# Patient Record
Sex: FEMALE | Race: WHITE | NOT HISPANIC OR LATINO
[De-identification: names, ages, dates, MRNs, and addresses within clinical notes are randomized per-mention and may not be internally consistent; named-entity substitution may affect disease eponyms.]

---

## 2016-12-19 NOTE — PATIENT PROFILE ADULT. - PMH
Acid reflux    Depression    Goiter    Hypertension Acid reflux    Depression    Goiter    Hypertension    Hypothyroid Acid reflux    Anxiety and depression    Goiter    HLD (hyperlipidemia)    Hypertension    Hypothyroid

## 2016-12-19 NOTE — PATIENT PROFILE ADULT. - PSH
History of adenoidectomy  age 6 yrs old  History of appendectomy  age 11 yrs old  History of bunionectomy of both great toes  2012- right; 10 yrs ago - left  History of cholecystectomy  13 yrs ago  History of pilonidal cyst  removal - age 18 yrs old  History of tonsillectomy  age 19 yrs old  History of total knee replacement  left- 2010

## 2016-12-19 NOTE — PATIENT PROFILE ADULT. - VISION (WITH CORRECTIVE LENSES IF THE PATIENT USUALLY WEARS THEM):
eyeglasses for reading and distance/Partially impaired: cannot see medication labels or newsprint, but can see obstacles in path, and the surrounding layout; can count fingers at arm's length Normal vision: sees adequately in most situations; can see medication labels, newsprint/eyeglasses for reading and distance

## 2016-12-20 VITALS
HEIGHT: 66 IN | OXYGEN SATURATION: 96 % | HEART RATE: 58 BPM | SYSTOLIC BLOOD PRESSURE: 132 MMHG | RESPIRATION RATE: 16 BRPM | TEMPERATURE: 97 F | WEIGHT: 210.98 LBS | DIASTOLIC BLOOD PRESSURE: 61 MMHG

## 2017-01-03 ENCOUNTER — RESULT REVIEW (OUTPATIENT)
Age: 72
End: 2017-01-03

## 2017-01-04 ENCOUNTER — INPATIENT (INPATIENT)
Facility: HOSPITAL | Age: 72
LOS: 1 days | Discharge: HOME | DRG: 472 | End: 2017-01-06
Attending: NEUROLOGICAL SURGERY | Admitting: NEUROLOGICAL SURGERY
Payer: MEDICARE

## 2017-01-04 DIAGNOSIS — Z90.89 ACQUIRED ABSENCE OF OTHER ORGANS: Chronic | ICD-10-CM

## 2017-01-04 DIAGNOSIS — Z90.49 ACQUIRED ABSENCE OF OTHER SPECIFIED PARTS OF DIGESTIVE TRACT: Chronic | ICD-10-CM

## 2017-01-04 DIAGNOSIS — Z96.659 PRESENCE OF UNSPECIFIED ARTIFICIAL KNEE JOINT: Chronic | ICD-10-CM

## 2017-01-04 DIAGNOSIS — Z98.890 OTHER SPECIFIED POSTPROCEDURAL STATES: Chronic | ICD-10-CM

## 2017-01-04 DIAGNOSIS — Z87.2 PERSONAL HISTORY OF DISEASES OF THE SKIN AND SUBCUTANEOUS TISSUE: Chronic | ICD-10-CM

## 2017-01-04 LAB
ANION GAP SERPL CALC-SCNC: 12 MMOL/L — SIGNIFICANT CHANGE UP (ref 9–16)
BASOPHILS NFR BLD AUTO: 0.3 % — SIGNIFICANT CHANGE UP (ref 0–2)
BUN SERPL-MCNC: 11 MG/DL — SIGNIFICANT CHANGE UP (ref 7–23)
CALCIUM SERPL-MCNC: 8.4 MG/DL — LOW (ref 8.5–10.5)
CHLORIDE SERPL-SCNC: 107 MMOL/L — SIGNIFICANT CHANGE UP (ref 96–108)
CO2 SERPL-SCNC: 23 MMOL/L — SIGNIFICANT CHANGE UP (ref 22–31)
CREAT SERPL-MCNC: 0.64 MG/DL — SIGNIFICANT CHANGE UP (ref 0.5–1.3)
EOSINOPHIL NFR BLD AUTO: 0.1 % — SIGNIFICANT CHANGE UP (ref 0–6)
GLUCOSE SERPL-MCNC: 177 MG/DL — HIGH (ref 70–99)
HCT VFR BLD CALC: 38.2 % — SIGNIFICANT CHANGE UP (ref 34.5–45)
HGB BLD-MCNC: 12.9 G/DL — SIGNIFICANT CHANGE UP (ref 11.5–15.5)
LYMPHOCYTES # BLD AUTO: 13.5 % — SIGNIFICANT CHANGE UP (ref 13–44)
MCHC RBC-ENTMCNC: 30.2 PG — SIGNIFICANT CHANGE UP (ref 27–34)
MCHC RBC-ENTMCNC: 33.8 G/DL — SIGNIFICANT CHANGE UP (ref 32–36)
MCV RBC AUTO: 89.5 FL — SIGNIFICANT CHANGE UP (ref 80–100)
MONOCYTES NFR BLD AUTO: 0.9 % — LOW (ref 2–14)
NEUTROPHILS NFR BLD AUTO: 85.2 % — HIGH (ref 43–77)
PLATELET # BLD AUTO: 221 K/UL — SIGNIFICANT CHANGE UP (ref 150–400)
POTASSIUM SERPL-MCNC: 3.3 MMOL/L — LOW (ref 3.5–5.3)
POTASSIUM SERPL-SCNC: 3.3 MMOL/L — LOW (ref 3.5–5.3)
RBC # BLD: 4.27 M/UL — SIGNIFICANT CHANGE UP (ref 3.8–5.2)
RBC # FLD: 12.8 % — SIGNIFICANT CHANGE UP (ref 10.3–16.9)
SODIUM SERPL-SCNC: 142 MMOL/L — SIGNIFICANT CHANGE UP (ref 135–145)
WBC # BLD: 7.6 K/UL — SIGNIFICANT CHANGE UP (ref 3.8–10.5)
WBC # FLD AUTO: 7.6 K/UL — SIGNIFICANT CHANGE UP (ref 3.8–10.5)

## 2017-01-04 RX ORDER — LEVOTHYROXINE SODIUM 125 MCG
1 TABLET ORAL
Qty: 0 | Refills: 0 | COMMUNITY

## 2017-01-04 RX ORDER — GLUCAGON INJECTION, SOLUTION 0.5 MG/.1ML
1 INJECTION, SOLUTION SUBCUTANEOUS ONCE
Qty: 0 | Refills: 0 | Status: DISCONTINUED | OUTPATIENT
Start: 2017-01-04 | End: 2017-01-06

## 2017-01-04 RX ORDER — VANCOMYCIN HCL 1 G
1000 VIAL (EA) INTRAVENOUS ONCE
Qty: 0 | Refills: 0 | Status: COMPLETED | OUTPATIENT
Start: 2017-01-04 | End: 2017-01-04

## 2017-01-04 RX ORDER — ACETAMINOPHEN 500 MG
650 TABLET ORAL EVERY 6 HOURS
Qty: 0 | Refills: 0 | Status: DISCONTINUED | OUTPATIENT
Start: 2017-01-04 | End: 2017-01-06

## 2017-01-04 RX ORDER — SERTRALINE 25 MG/1
50 TABLET, FILM COATED ORAL DAILY
Qty: 0 | Refills: 0 | Status: DISCONTINUED | OUTPATIENT
Start: 2017-01-04 | End: 2017-01-06

## 2017-01-04 RX ORDER — DEXTROSE 50 % IN WATER 50 %
25 SYRINGE (ML) INTRAVENOUS ONCE
Qty: 0 | Refills: 0 | Status: DISCONTINUED | OUTPATIENT
Start: 2017-01-04 | End: 2017-01-06

## 2017-01-04 RX ORDER — SENNA PLUS 8.6 MG/1
2 TABLET ORAL AT BEDTIME
Qty: 0 | Refills: 0 | Status: DISCONTINUED | OUTPATIENT
Start: 2017-01-04 | End: 2017-01-06

## 2017-01-04 RX ORDER — DEXAMETHASONE 0.5 MG/5ML
4 ELIXIR ORAL EVERY 6 HOURS
Qty: 0 | Refills: 0 | Status: COMPLETED | OUTPATIENT
Start: 2017-01-04 | End: 2017-01-05

## 2017-01-04 RX ORDER — EZETIMIBE 10 MG/1
1 TABLET ORAL
Qty: 0 | Refills: 0 | COMMUNITY

## 2017-01-04 RX ORDER — OXYBUTYNIN CHLORIDE 5 MG
5 TABLET ORAL
Qty: 0 | Refills: 0 | Status: DISCONTINUED | OUTPATIENT
Start: 2017-01-04 | End: 2017-01-06

## 2017-01-04 RX ORDER — OXYBUTYNIN CHLORIDE 5 MG
5 TABLET ORAL
Qty: 0 | Refills: 0 | Status: DISCONTINUED | OUTPATIENT
Start: 2017-01-04 | End: 2017-01-04

## 2017-01-04 RX ORDER — LEVOTHYROXINE SODIUM 125 MCG
88 TABLET ORAL DAILY
Qty: 0 | Refills: 0 | Status: DISCONTINUED | OUTPATIENT
Start: 2017-01-04 | End: 2017-01-06

## 2017-01-04 RX ORDER — METOCLOPRAMIDE HCL 10 MG
10 TABLET ORAL ONCE
Qty: 0 | Refills: 0 | Status: COMPLETED | OUTPATIENT
Start: 2017-01-04 | End: 2017-01-04

## 2017-01-04 RX ORDER — DEXTROSE 50 % IN WATER 50 %
1 SYRINGE (ML) INTRAVENOUS ONCE
Qty: 0 | Refills: 0 | Status: DISCONTINUED | OUTPATIENT
Start: 2017-01-04 | End: 2017-01-06

## 2017-01-04 RX ORDER — SODIUM CHLORIDE 9 MG/ML
1000 INJECTION, SOLUTION INTRAVENOUS
Qty: 0 | Refills: 0 | Status: DISCONTINUED | OUTPATIENT
Start: 2017-01-04 | End: 2017-01-06

## 2017-01-04 RX ORDER — AMLODIPINE BESYLATE 2.5 MG/1
1 TABLET ORAL
Qty: 0 | Refills: 0 | COMMUNITY

## 2017-01-04 RX ORDER — DEXTROSE 50 % IN WATER 50 %
12.5 SYRINGE (ML) INTRAVENOUS ONCE
Qty: 0 | Refills: 0 | Status: DISCONTINUED | OUTPATIENT
Start: 2017-01-04 | End: 2017-01-06

## 2017-01-04 RX ORDER — INSULIN LISPRO 100/ML
VIAL (ML) SUBCUTANEOUS
Qty: 0 | Refills: 0 | Status: DISCONTINUED | OUTPATIENT
Start: 2017-01-04 | End: 2017-01-06

## 2017-01-04 RX ORDER — AMLODIPINE BESYLATE 2.5 MG/1
2.5 TABLET ORAL DAILY
Qty: 0 | Refills: 0 | Status: DISCONTINUED | OUTPATIENT
Start: 2017-01-04 | End: 2017-01-06

## 2017-01-04 RX ORDER — DEXTROSE MONOHYDRATE, SODIUM CHLORIDE, AND POTASSIUM CHLORIDE 50; .745; 4.5 G/1000ML; G/1000ML; G/1000ML
1000 INJECTION, SOLUTION INTRAVENOUS
Qty: 0 | Refills: 0 | Status: DISCONTINUED | OUTPATIENT
Start: 2017-01-04 | End: 2017-01-05

## 2017-01-04 RX ORDER — VALSARTAN 80 MG/1
160 TABLET ORAL DAILY
Qty: 0 | Refills: 0 | Status: DISCONTINUED | OUTPATIENT
Start: 2017-01-04 | End: 2017-01-06

## 2017-01-04 RX ORDER — SERTRALINE 25 MG/1
1 TABLET, FILM COATED ORAL
Qty: 0 | Refills: 0 | COMMUNITY

## 2017-01-04 RX ORDER — VALSARTAN 80 MG/1
1 TABLET ORAL
Qty: 0 | Refills: 0 | COMMUNITY

## 2017-01-04 RX ORDER — OXYBUTYNIN CHLORIDE 5 MG
0 TABLET ORAL
Qty: 0 | Refills: 0 | COMMUNITY

## 2017-01-04 RX ORDER — DOCUSATE SODIUM 100 MG
100 CAPSULE ORAL THREE TIMES A DAY
Qty: 0 | Refills: 0 | Status: DISCONTINUED | OUTPATIENT
Start: 2017-01-04 | End: 2017-01-06

## 2017-01-04 RX ORDER — SIMVASTATIN 20 MG/1
20 TABLET, FILM COATED ORAL AT BEDTIME
Qty: 0 | Refills: 0 | Status: DISCONTINUED | OUTPATIENT
Start: 2017-01-04 | End: 2017-01-06

## 2017-01-04 RX ORDER — HYDROMORPHONE HYDROCHLORIDE 2 MG/ML
1 INJECTION INTRAMUSCULAR; INTRAVENOUS; SUBCUTANEOUS
Qty: 0 | Refills: 0 | Status: DISCONTINUED | OUTPATIENT
Start: 2017-01-04 | End: 2017-01-06

## 2017-01-04 RX ORDER — MAGNESIUM HYDROXIDE 400 MG/1
30 TABLET, CHEWABLE ORAL EVERY 12 HOURS
Qty: 0 | Refills: 0 | Status: DISCONTINUED | OUTPATIENT
Start: 2017-01-04 | End: 2017-01-06

## 2017-01-04 RX ADMIN — Medication 2: at 18:47

## 2017-01-04 RX ADMIN — Medication 4 MILLIGRAM(S): at 18:41

## 2017-01-04 RX ADMIN — SIMVASTATIN 20 MILLIGRAM(S): 20 TABLET, FILM COATED ORAL at 23:15

## 2017-01-04 RX ADMIN — Medication 10 MILLIGRAM(S): at 18:42

## 2017-01-04 RX ADMIN — Medication 4 MILLIGRAM(S): at 23:16

## 2017-01-04 RX ADMIN — SENNA PLUS 2 TABLET(S): 8.6 TABLET ORAL at 23:15

## 2017-01-04 RX ADMIN — HYDROMORPHONE HYDROCHLORIDE 1 MILLIGRAM(S): 2 INJECTION INTRAMUSCULAR; INTRAVENOUS; SUBCUTANEOUS at 20:24

## 2017-01-04 RX ADMIN — Medication 100 MILLIGRAM(S): at 23:15

## 2017-01-04 RX ADMIN — Medication 250 MILLIGRAM(S): at 23:36

## 2017-01-04 RX ADMIN — DEXTROSE MONOHYDRATE, SODIUM CHLORIDE, AND POTASSIUM CHLORIDE 100 MILLILITER(S): 50; .745; 4.5 INJECTION, SOLUTION INTRAVENOUS at 18:43

## 2017-01-04 NOTE — H&P ADULT. - FAMILY HISTORY
Father  Still living? No  Family history of hypertension in father, Age at diagnosis: Age Unknown     Mother  Still living? No  Family history of hypertension in mother, Age at diagnosis: Age Unknown

## 2017-01-04 NOTE — BRIEF OPERATIVE NOTE - PROCEDURE
Anterior cervical discectomy at multiple levels with fusion  01/04/2017  C56 C67  Active  ASALVATORE

## 2017-01-04 NOTE — H&P ADULT. - HISTORY OF PRESENT ILLNESS
72 y/o female female with longstanding neck pain presents today for elective ACDF C67 possible C56. Patient states 3 years ago she suffered a fall which triggered her symptoms. She has since experienced worsening neck pain and left arm numbness and weakness. she reports conservative management has failed. She is right hand dominant. She is unable to raise her left arm above her shoulder. Denies any b/b dysfunction.

## 2017-01-04 NOTE — H&P ADULT. - PMH
Acid reflux    Anxiety and depression    Goiter    HLD (hyperlipidemia)    Hypertension    Hypothyroid

## 2017-01-04 NOTE — H&P ADULT. - ASSESSMENT
70 y/o female for elective ACDF today  consent  post op monitroing  pain control  ADAT  PT/OT  brace for support   early mobilziation  post op abx

## 2017-01-05 LAB
ANION GAP SERPL CALC-SCNC: 10 MMOL/L — SIGNIFICANT CHANGE UP (ref 9–16)
BASOPHILS NFR BLD AUTO: 0 % — SIGNIFICANT CHANGE UP (ref 0–2)
BUN SERPL-MCNC: 10 MG/DL — SIGNIFICANT CHANGE UP (ref 7–23)
CALCIUM SERPL-MCNC: 8.6 MG/DL — SIGNIFICANT CHANGE UP (ref 8.5–10.5)
CHLORIDE SERPL-SCNC: 108 MMOL/L — SIGNIFICANT CHANGE UP (ref 96–108)
CO2 SERPL-SCNC: 25 MMOL/L — SIGNIFICANT CHANGE UP (ref 22–31)
CREAT SERPL-MCNC: 0.52 MG/DL — SIGNIFICANT CHANGE UP (ref 0.5–1.3)
EOSINOPHIL NFR BLD AUTO: 0 % — SIGNIFICANT CHANGE UP (ref 0–6)
GLUCOSE SERPL-MCNC: 135 MG/DL — HIGH (ref 70–99)
HBA1C BLD-MCNC: 5.9 % — HIGH (ref 4.8–5.6)
HCT VFR BLD CALC: 37.5 % — SIGNIFICANT CHANGE UP (ref 34.5–45)
HGB BLD-MCNC: 12.5 G/DL — SIGNIFICANT CHANGE UP (ref 11.5–15.5)
LYMPHOCYTES # BLD AUTO: 10.4 % — LOW (ref 13–44)
MCHC RBC-ENTMCNC: 30.2 PG — SIGNIFICANT CHANGE UP (ref 27–34)
MCHC RBC-ENTMCNC: 33.3 G/DL — SIGNIFICANT CHANGE UP (ref 32–36)
MCV RBC AUTO: 90.6 FL — SIGNIFICANT CHANGE UP (ref 80–100)
MONOCYTES NFR BLD AUTO: 2.1 % — SIGNIFICANT CHANGE UP (ref 2–14)
NEUTROPHILS NFR BLD AUTO: 87.5 % — HIGH (ref 43–77)
PLATELET # BLD AUTO: 229 K/UL — SIGNIFICANT CHANGE UP (ref 150–400)
POTASSIUM SERPL-MCNC: 4 MMOL/L — SIGNIFICANT CHANGE UP (ref 3.5–5.3)
POTASSIUM SERPL-SCNC: 4 MMOL/L — SIGNIFICANT CHANGE UP (ref 3.5–5.3)
RBC # BLD: 4.14 M/UL — SIGNIFICANT CHANGE UP (ref 3.8–5.2)
RBC # FLD: 13 % — SIGNIFICANT CHANGE UP (ref 10.3–16.9)
SODIUM SERPL-SCNC: 143 MMOL/L — SIGNIFICANT CHANGE UP (ref 135–145)
WBC # BLD: 10 K/UL — SIGNIFICANT CHANGE UP (ref 3.8–10.5)
WBC # FLD AUTO: 10 K/UL — SIGNIFICANT CHANGE UP (ref 3.8–10.5)

## 2017-01-05 PROCEDURE — 72125 CT NECK SPINE W/O DYE: CPT | Mod: 26

## 2017-01-05 RX ORDER — FAMOTIDINE 10 MG/ML
20 INJECTION INTRAVENOUS DAILY
Qty: 0 | Refills: 0 | Status: DISCONTINUED | OUTPATIENT
Start: 2017-01-05 | End: 2017-01-06

## 2017-01-05 RX ORDER — SODIUM CHLORIDE 9 MG/ML
1000 INJECTION INTRAMUSCULAR; INTRAVENOUS; SUBCUTANEOUS
Qty: 0 | Refills: 0 | Status: DISCONTINUED | OUTPATIENT
Start: 2017-01-05 | End: 2017-01-06

## 2017-01-05 RX ORDER — ENOXAPARIN SODIUM 100 MG/ML
40 INJECTION SUBCUTANEOUS AT BEDTIME
Qty: 0 | Refills: 0 | Status: DISCONTINUED | OUTPATIENT
Start: 2017-01-05 | End: 2017-01-06

## 2017-01-05 RX ADMIN — FAMOTIDINE 20 MILLIGRAM(S): 10 INJECTION INTRAVENOUS at 16:50

## 2017-01-05 RX ADMIN — VALSARTAN 160 MILLIGRAM(S): 80 TABLET ORAL at 09:00

## 2017-01-05 RX ADMIN — Medication 100 MILLIGRAM(S): at 22:43

## 2017-01-05 RX ADMIN — AMLODIPINE BESYLATE 2.5 MILLIGRAM(S): 2.5 TABLET ORAL at 09:00

## 2017-01-05 RX ADMIN — Medication 88 MICROGRAM(S): at 07:08

## 2017-01-05 RX ADMIN — Medication 4 MILLIGRAM(S): at 07:08

## 2017-01-05 RX ADMIN — ENOXAPARIN SODIUM 40 MILLIGRAM(S): 100 INJECTION SUBCUTANEOUS at 22:43

## 2017-01-05 RX ADMIN — SERTRALINE 50 MILLIGRAM(S): 25 TABLET, FILM COATED ORAL at 09:00

## 2017-01-05 RX ADMIN — Medication 5 MILLIGRAM(S): at 09:00

## 2017-01-05 RX ADMIN — Medication 5 MILLIGRAM(S): at 17:33

## 2017-01-05 RX ADMIN — SENNA PLUS 2 TABLET(S): 8.6 TABLET ORAL at 22:43

## 2017-01-05 RX ADMIN — Medication 4 MILLIGRAM(S): at 12:21

## 2017-01-05 RX ADMIN — DEXTROSE MONOHYDRATE, SODIUM CHLORIDE, AND POTASSIUM CHLORIDE 100 MILLILITER(S): 50; .745; 4.5 INJECTION, SOLUTION INTRAVENOUS at 08:49

## 2017-01-05 RX ADMIN — SIMVASTATIN 20 MILLIGRAM(S): 20 TABLET, FILM COATED ORAL at 22:43

## 2017-01-05 RX ADMIN — SODIUM CHLORIDE 100 MILLILITER(S): 9 INJECTION INTRAMUSCULAR; INTRAVENOUS; SUBCUTANEOUS at 16:50

## 2017-01-05 NOTE — PROGRESS NOTE ADULT - SUBJECTIVE AND OBJECTIVE BOX
Pt was seen and examined at the bedside, c/o L middle finger pain (new), also LUE weakness is slightly worse then pre-op.  Pt denies sob, cp, acute visual changes, difficulty swallowing    ICU Vital Signs Last 24 Hrs  T(C): 35.9, Max: 36.5 (01-04 @ 17:20)  T(F): 96.6, Max: 97.7 (01-04 @ 17:20)  HR: 70 (70 - 100)  BP: 124/58 (101/60 - 129/63)  BP(mean): --  ABP: --  ABP(mean): --  RR: 16 (16 - 18)  SpO2: 95% (94% - 96%)    Exam:  AA&Ox3, NAD, clear speech  CNs II-XII grossly intact  OJEDA, LUE 3/5, LLE 4/5, RUE -5/5, RLE -5/5  sensation to LT grossly intact in all dermatomes  LUE middle finger warm to touch, capillary refill <2sec, able to move  Neck: + soft collar, dressing clean and dry, + YESENIA  + romero    Plan:  Decadron, Insulin sliding scale  CT C-spine w/o cont  YESENIA output  Pain Mewds PRN  Advance diet as tolerated  Pt eval  D/w Dr. Noel

## 2017-01-06 ENCOUNTER — TRANSCRIPTION ENCOUNTER (OUTPATIENT)
Age: 72
End: 2017-01-06

## 2017-01-06 VITALS
RESPIRATION RATE: 16 BRPM | OXYGEN SATURATION: 95 % | SYSTOLIC BLOOD PRESSURE: 130 MMHG | TEMPERATURE: 99 F | HEART RATE: 55 BPM | DIASTOLIC BLOOD PRESSURE: 70 MMHG

## 2017-01-06 LAB
ANION GAP SERPL CALC-SCNC: 7 MMOL/L — LOW (ref 9–16)
BASOPHILS NFR BLD AUTO: 0.1 % — SIGNIFICANT CHANGE UP (ref 0–2)
BUN SERPL-MCNC: 18 MG/DL — SIGNIFICANT CHANGE UP (ref 7–23)
CALCIUM SERPL-MCNC: 8.3 MG/DL — LOW (ref 8.5–10.5)
CHLORIDE SERPL-SCNC: 108 MMOL/L — SIGNIFICANT CHANGE UP (ref 96–108)
CO2 SERPL-SCNC: 27 MMOL/L — SIGNIFICANT CHANGE UP (ref 22–31)
CREAT SERPL-MCNC: 0.72 MG/DL — SIGNIFICANT CHANGE UP (ref 0.5–1.3)
EOSINOPHIL NFR BLD AUTO: 0.1 % — SIGNIFICANT CHANGE UP (ref 0–6)
GLUCOSE SERPL-MCNC: 103 MG/DL — HIGH (ref 70–99)
HCT VFR BLD CALC: 35.5 % — SIGNIFICANT CHANGE UP (ref 34.5–45)
HGB BLD-MCNC: 11.8 G/DL — SIGNIFICANT CHANGE UP (ref 11.5–15.5)
LYMPHOCYTES # BLD AUTO: 14.3 % — SIGNIFICANT CHANGE UP (ref 13–44)
MCHC RBC-ENTMCNC: 30.5 PG — SIGNIFICANT CHANGE UP (ref 27–34)
MCHC RBC-ENTMCNC: 33.2 G/DL — SIGNIFICANT CHANGE UP (ref 32–36)
MCV RBC AUTO: 91.7 FL — SIGNIFICANT CHANGE UP (ref 80–100)
MONOCYTES NFR BLD AUTO: 6.5 % — SIGNIFICANT CHANGE UP (ref 2–14)
NEUTROPHILS NFR BLD AUTO: 79 % — HIGH (ref 43–77)
PLATELET # BLD AUTO: 250 K/UL — SIGNIFICANT CHANGE UP (ref 150–400)
POTASSIUM SERPL-MCNC: 4.2 MMOL/L — SIGNIFICANT CHANGE UP (ref 3.5–5.3)
POTASSIUM SERPL-SCNC: 4.2 MMOL/L — SIGNIFICANT CHANGE UP (ref 3.5–5.3)
RBC # BLD: 3.87 M/UL — SIGNIFICANT CHANGE UP (ref 3.8–5.2)
RBC # FLD: 12.9 % — SIGNIFICANT CHANGE UP (ref 10.3–16.9)
SODIUM SERPL-SCNC: 142 MMOL/L — SIGNIFICANT CHANGE UP (ref 135–145)
SURGICAL PATHOLOGY STUDY: SIGNIFICANT CHANGE UP
WBC # BLD: 14 K/UL — HIGH (ref 3.8–10.5)
WBC # FLD AUTO: 14 K/UL — HIGH (ref 3.8–10.5)

## 2017-01-06 PROCEDURE — 72125 CT NECK SPINE W/O DYE: CPT

## 2017-01-06 PROCEDURE — 83036 HEMOGLOBIN GLYCOSYLATED A1C: CPT

## 2017-01-06 PROCEDURE — 85025 COMPLETE CBC W/AUTO DIFF WBC: CPT

## 2017-01-06 PROCEDURE — C1713: CPT

## 2017-01-06 PROCEDURE — 88304 TISSUE EXAM BY PATHOLOGIST: CPT

## 2017-01-06 PROCEDURE — 76000 FLUOROSCOPY <1 HR PHYS/QHP: CPT

## 2017-01-06 PROCEDURE — 80048 BASIC METABOLIC PNL TOTAL CA: CPT

## 2017-01-06 PROCEDURE — C1889: CPT

## 2017-01-06 PROCEDURE — 36415 COLL VENOUS BLD VENIPUNCTURE: CPT

## 2017-01-06 RX ORDER — ALPRAZOLAM 0.25 MG
0 TABLET ORAL
Qty: 0 | Refills: 0 | COMMUNITY

## 2017-01-06 RX ORDER — UBIDECARENONE 100 MG
1 CAPSULE ORAL
Qty: 0 | Refills: 0 | COMMUNITY

## 2017-01-06 RX ORDER — DIAZEPAM 5 MG
1 TABLET ORAL
Qty: 21 | Refills: 0 | OUTPATIENT
Start: 2017-01-06 | End: 2017-01-13

## 2017-01-06 RX ADMIN — AMLODIPINE BESYLATE 2.5 MILLIGRAM(S): 2.5 TABLET ORAL at 06:28

## 2017-01-06 RX ADMIN — VALSARTAN 160 MILLIGRAM(S): 80 TABLET ORAL at 06:28

## 2017-01-06 RX ADMIN — Medication 100 MILLIGRAM(S): at 06:29

## 2017-01-06 RX ADMIN — FAMOTIDINE 20 MILLIGRAM(S): 10 INJECTION INTRAVENOUS at 12:08

## 2017-01-06 RX ADMIN — SERTRALINE 50 MILLIGRAM(S): 25 TABLET, FILM COATED ORAL at 06:29

## 2017-01-06 RX ADMIN — Medication 88 MICROGRAM(S): at 06:29

## 2017-01-06 RX ADMIN — SODIUM CHLORIDE 100 MILLILITER(S): 9 INJECTION INTRAMUSCULAR; INTRAVENOUS; SUBCUTANEOUS at 03:05

## 2017-01-06 RX ADMIN — Medication 5 MILLIGRAM(S): at 06:29

## 2017-01-06 NOTE — DISCHARGE NOTE ADULT - NS AS ACTIVITY OBS
Stairs allowed/Do not drive or operate machinery/No Heavy lifting/straining/Bathing allowed/Walking-Indoors allowed/Do not make important decisions

## 2017-01-06 NOTE — DISCHARGE NOTE ADULT - PATIENT PORTAL LINK FT
“You can access the [facility] Patient Portal, offered by [facility], by registering with the following website: [email or web address]”

## 2017-01-06 NOTE — DISCHARGE NOTE ADULT - MEDICATION SUMMARY - MEDICATIONS TO STOP TAKING
I will STOP taking the medications listed below when I get home from the hospital:    Vit  D  --   1 tab by mouth once daily    Iron  --   1 tab by mouth once daily    Calcium 500+D  --  by mouth 1 tab once daily    ALPRAZolam 0.25 mg oral tablet  --  by mouth  1 tab as needed    Co Q-10 100 mg oral capsule  -- 1 cap(s) by mouth once a day    hydrocodone/acetaminophen  --   7.5-325 mg 1 tab by mouth as needed

## 2017-01-06 NOTE — PROGRESS NOTE ADULT - SUBJECTIVE AND OBJECTIVE BOX
Subjective: Seen/evaluated at bedside.  Pain and strength in upper left arm improving.  Pain in left hand persistent.  Otherwise no new complaints.  Wants to go home today    T(C): 37.1, Max: 37.1 (01-06 @ 04:55)  HR: 59 (55 - 63)  BP: 130/76 (105/64 - 131/74)  RR: 18 (14 - 18)  SpO2: 94% (93% - 95%)  Wt(kg): --    Exam: A/Ox3, FC, speech clear  Right side 5/5, sensation grossly intact  LUE 3/5, LLE 4/5, sensation grossly intact    CBC Full  -  ( 06 Jan 2017 08:26 )  WBC Count : 14.0 K/uL  Hemoglobin : 11.8 g/dL  Hematocrit : 35.5 %  Platelet Count - Automated : 250 K/uL  Mean Cell Volume : 91.7 fL  Mean Cell Hemoglobin : 30.5 pg  Mean Cell Hemoglobin Concentration : 33.2 g/dL  Auto Neutrophil # : x  Auto Lymphocyte # : x  Auto Monocyte # : x  Auto Eosinophil # : x  Auto Basophil # : x  Auto Neutrophil % : 79.0 %  Auto Lymphocyte % : 14.3 %  Auto Monocyte % : 6.5 %  Auto Eosinophil % : 0.1 %  Auto Basophil % : 0.1 %    06 Jan 2017 08:26    142    |  108    |  18     ----------------------------<  103    4.2     |  27     |  0.72     Ca    8.3        06 Jan 2017 08:26            Wound: C/D/I, +steri-strips    Imaging: C-spine CT--hardware in place    Assessment/Plan: C5-7 ACDF  -Pain control  -PT/OOB  -To be fitted for brace today  -D/C home today  -D/W Dr. Noel

## 2017-01-06 NOTE — DISCHARGE NOTE ADULT - CARE PLAN
Principal Discharge DX:	HNP (herniated nucleus pulposus), cervical  Goal:	return to full function  Instructions for follow-up, activity and diet:	1) Wear soft cervical collar for comfort as needed  2) Keep incision clean and dry.  Do not soak incision or apply ointments to it  3) Call Dr. Noel's office to schedule follow-up appointment in 2 weeks  4) Notify Dr. Noel's office if left arm numbness, pain, or weakness worsens while you are home

## 2017-01-06 NOTE — DISCHARGE NOTE ADULT - PLAN OF CARE
return to full function 1) Wear soft cervical collar for comfort as needed  2) Keep incision clean and dry.  Do not soak incision or apply ointments to it  3) Call Dr. Noel's office to schedule follow-up appointment in 2 weeks  4) Notify Dr. Noel's office if left arm numbness, pain, or weakness worsens while you are home

## 2017-01-06 NOTE — DISCHARGE NOTE ADULT - HOSPITAL COURSE
55 year old female underwent C5-7 ACDF.  Postoperatively pain and weakness in left arm initially worsened.  CT performed showed hardware in place.  Placed on steroids with improvement in symptoms.  Otherwise uncomplicated hospital course.  Cleared for discharge home.  Will follow up as outpatient with Dr. Noel in 2 weeks

## 2017-01-06 NOTE — DISCHARGE NOTE ADULT - MEDICATION SUMMARY - MEDICATIONS TO TAKE
I will START or STAY ON the medications listed below when I get home from the hospital:    Percocet 5/325 oral tablet  -- 1-2 tab(s) by mouth every 4 hours, As needed,Pain MDD:3g tylenol  -- Indication: For pain    valsartan 160 mg oral tablet  -- 1 tab(s) by mouth once a day  -- Indication: For High blood pressure    diazePAM 5 mg oral tablet  -- 1 tab(s) by mouth every 8 hours, As Needed, Anxiety MDD:3 tabs  -- Indication: For Anxiety and depression and muscle spasms    Zoloft 50 mg oral tablet  -- 1 tab(s) by mouth once a day  -- Indication: For Anxiety and depression    Zetia 10 mg oral tablet  -- 1 tab(s) by mouth once a day  -- Indication: For High cholesterol    amLODIPine 2.5 mg oral tablet  -- 1 tab(s) by mouth once a day  -- Indication: For High blood pressure    Synthroid 88 mcg (0.088 mg) oral tablet  -- 1 tab(s) by mouth once a day  -- Indication: For Hypothyroid    oxybutynin 5 mg oral tablet  --  by mouth 1 tab as needed  -- Indication: For Urinary

## 2017-01-11 DIAGNOSIS — M81.0 AGE-RELATED OSTEOPOROSIS WITHOUT CURRENT PATHOLOGICAL FRACTURE: ICD-10-CM

## 2017-01-11 DIAGNOSIS — E78.5 HYPERLIPIDEMIA, UNSPECIFIED: ICD-10-CM

## 2017-01-11 DIAGNOSIS — M54.2 CERVICALGIA: ICD-10-CM

## 2017-01-11 DIAGNOSIS — Z96.652 PRESENCE OF LEFT ARTIFICIAL KNEE JOINT: ICD-10-CM

## 2017-01-11 DIAGNOSIS — E03.9 HYPOTHYROIDISM, UNSPECIFIED: ICD-10-CM

## 2017-01-11 DIAGNOSIS — M50.01 CERVICAL DISC DISORDER WITH MYELOPATHY, HIGH CERVICAL REGION: ICD-10-CM

## 2017-01-11 DIAGNOSIS — K21.9 GASTRO-ESOPHAGEAL REFLUX DISEASE WITHOUT ESOPHAGITIS: ICD-10-CM

## 2017-01-11 DIAGNOSIS — E66.9 OBESITY, UNSPECIFIED: ICD-10-CM

## 2017-01-11 DIAGNOSIS — E04.9 NONTOXIC GOITER, UNSPECIFIED: ICD-10-CM

## 2017-01-11 DIAGNOSIS — F41.9 ANXIETY DISORDER, UNSPECIFIED: ICD-10-CM

## 2017-01-11 DIAGNOSIS — F32.9 MAJOR DEPRESSIVE DISORDER, SINGLE EPISODE, UNSPECIFIED: ICD-10-CM

## 2017-01-11 DIAGNOSIS — M47.12 OTHER SPONDYLOSIS WITH MYELOPATHY, CERVICAL REGION: ICD-10-CM

## 2017-01-11 DIAGNOSIS — I10 ESSENTIAL (PRIMARY) HYPERTENSION: ICD-10-CM

## 2017-01-11 DIAGNOSIS — M47.22 OTHER SPONDYLOSIS WITH RADICULOPATHY, CERVICAL REGION: ICD-10-CM

## 2023-08-31 ENCOUNTER — INPATIENT (INPATIENT)
Facility: HOSPITAL | Age: 78
LOS: 8 days | Discharge: ANOTHER HOSPITAL | DRG: 315 | End: 2023-09-09
Attending: INTERNAL MEDICINE | Admitting: INTERNAL MEDICINE
Payer: MEDICARE

## 2023-08-31 VITALS
RESPIRATION RATE: 18 BRPM | DIASTOLIC BLOOD PRESSURE: 65 MMHG | TEMPERATURE: 99 F | OXYGEN SATURATION: 95 % | HEART RATE: 83 BPM | SYSTOLIC BLOOD PRESSURE: 95 MMHG

## 2023-08-31 DIAGNOSIS — Z90.49 ACQUIRED ABSENCE OF OTHER SPECIFIED PARTS OF DIGESTIVE TRACT: Chronic | ICD-10-CM

## 2023-08-31 DIAGNOSIS — I95.9 HYPOTENSION, UNSPECIFIED: ICD-10-CM

## 2023-08-31 DIAGNOSIS — Z87.2 PERSONAL HISTORY OF DISEASES OF THE SKIN AND SUBCUTANEOUS TISSUE: Chronic | ICD-10-CM

## 2023-08-31 DIAGNOSIS — Z96.659 PRESENCE OF UNSPECIFIED ARTIFICIAL KNEE JOINT: Chronic | ICD-10-CM

## 2023-08-31 DIAGNOSIS — Z90.89 ACQUIRED ABSENCE OF OTHER ORGANS: Chronic | ICD-10-CM

## 2023-08-31 DIAGNOSIS — Z98.890 OTHER SPECIFIED POSTPROCEDURAL STATES: Chronic | ICD-10-CM

## 2023-08-31 LAB
ALBUMIN SERPL ELPH-MCNC: 2.1 G/DL — LOW (ref 3.5–5)
ALP SERPL-CCNC: 103 U/L — SIGNIFICANT CHANGE UP (ref 40–120)
ALT FLD-CCNC: 23 U/L DA — SIGNIFICANT CHANGE UP (ref 10–60)
ANION GAP SERPL CALC-SCNC: 11 MMOL/L — SIGNIFICANT CHANGE UP (ref 5–17)
ANISOCYTOSIS BLD QL: SLIGHT — SIGNIFICANT CHANGE UP
AST SERPL-CCNC: 8 U/L — LOW (ref 10–40)
BASOPHILS # BLD AUTO: 0 K/UL — SIGNIFICANT CHANGE UP (ref 0–0.2)
BASOPHILS NFR BLD AUTO: 0 % — SIGNIFICANT CHANGE UP (ref 0–2)
BILIRUB SERPL-MCNC: 0.4 MG/DL — SIGNIFICANT CHANGE UP (ref 0.2–1.2)
BLASTS # FLD: 1 % — HIGH (ref 0–0)
BUN SERPL-MCNC: 45 MG/DL — HIGH (ref 7–18)
CALCIUM SERPL-MCNC: 7.4 MG/DL — LOW (ref 8.4–10.5)
CHLORIDE SERPL-SCNC: 102 MMOL/L — SIGNIFICANT CHANGE UP (ref 96–108)
CO2 SERPL-SCNC: 23 MMOL/L — SIGNIFICANT CHANGE UP (ref 22–31)
CREAT SERPL-MCNC: 1.22 MG/DL — SIGNIFICANT CHANGE UP (ref 0.5–1.3)
EGFR: 45 ML/MIN/1.73M2 — LOW
EOSINOPHIL # BLD AUTO: 0.08 K/UL — SIGNIFICANT CHANGE UP (ref 0–0.5)
EOSINOPHIL NFR BLD AUTO: 1 % — SIGNIFICANT CHANGE UP (ref 0–6)
FLUAV AG NPH QL: SIGNIFICANT CHANGE UP
FLUBV AG NPH QL: SIGNIFICANT CHANGE UP
GLUCOSE SERPL-MCNC: 116 MG/DL — HIGH (ref 70–99)
HCT VFR BLD CALC: 32.8 % — LOW (ref 34.5–45)
HGB BLD-MCNC: 10.5 G/DL — LOW (ref 11.5–15.5)
HIV 1 & 2 AB SERPL IA.RAPID: SIGNIFICANT CHANGE UP
LACTATE SERPL-SCNC: 1.2 MMOL/L — SIGNIFICANT CHANGE UP (ref 0.7–2)
LYMPHOCYTES # BLD AUTO: 1.76 K/UL — SIGNIFICANT CHANGE UP (ref 1–3.3)
LYMPHOCYTES # BLD AUTO: 23 % — SIGNIFICANT CHANGE UP (ref 13–44)
MACROCYTES BLD QL: SLIGHT — SIGNIFICANT CHANGE UP
MANUAL SMEAR VERIFICATION: SIGNIFICANT CHANGE UP
MCHC RBC-ENTMCNC: 27.9 PG — SIGNIFICANT CHANGE UP (ref 27–34)
MCHC RBC-ENTMCNC: 32 GM/DL — SIGNIFICANT CHANGE UP (ref 32–36)
MCV RBC AUTO: 87.2 FL — SIGNIFICANT CHANGE UP (ref 80–100)
MICROCYTES BLD QL: SLIGHT — SIGNIFICANT CHANGE UP
MONOCYTES # BLD AUTO: 0.08 K/UL — SIGNIFICANT CHANGE UP (ref 0–0.9)
MONOCYTES NFR BLD AUTO: 1 % — LOW (ref 2–14)
NEUTROPHILS # BLD AUTO: 5.27 K/UL — SIGNIFICANT CHANGE UP (ref 1.8–7.4)
NEUTROPHILS NFR BLD AUTO: 64 % — SIGNIFICANT CHANGE UP (ref 43–77)
NEUTS BAND # BLD: 5 % — SIGNIFICANT CHANGE UP (ref 0–8)
NRBC # BLD: 0 /100 — SIGNIFICANT CHANGE UP (ref 0–0)
PLAT MORPH BLD: NORMAL — SIGNIFICANT CHANGE UP
PLATELET # BLD AUTO: 522 K/UL — HIGH (ref 150–400)
PLATELET COUNT - ESTIMATE: ABNORMAL
POLYCHROMASIA BLD QL SMEAR: SLIGHT — SIGNIFICANT CHANGE UP
POTASSIUM SERPL-MCNC: 3.4 MMOL/L — LOW (ref 3.5–5.3)
POTASSIUM SERPL-SCNC: 3.4 MMOL/L — LOW (ref 3.5–5.3)
PROMYELOCYTES # FLD: 1 % — HIGH (ref 0–0)
PROT SERPL-MCNC: 6 G/DL — SIGNIFICANT CHANGE UP (ref 6–8.3)
RBC # BLD: 3.76 M/UL — LOW (ref 3.8–5.2)
RBC # FLD: 15.7 % — HIGH (ref 10.3–14.5)
RBC BLD AUTO: ABNORMAL
SARS-COV-2 RNA SPEC QL NAA+PROBE: SIGNIFICANT CHANGE UP
SODIUM SERPL-SCNC: 136 MMOL/L — SIGNIFICANT CHANGE UP (ref 135–145)
VARIANT LYMPHS # BLD: 4 % — SIGNIFICANT CHANGE UP (ref 0–6)
WBC # BLD: 7.64 K/UL — SIGNIFICANT CHANGE UP (ref 3.8–10.5)
WBC # FLD AUTO: 7.64 K/UL — SIGNIFICANT CHANGE UP (ref 3.8–10.5)

## 2023-08-31 PROCEDURE — 71045 X-RAY EXAM CHEST 1 VIEW: CPT | Mod: 26

## 2023-08-31 PROCEDURE — 93010 ELECTROCARDIOGRAM REPORT: CPT

## 2023-08-31 PROCEDURE — 99285 EMERGENCY DEPT VISIT HI MDM: CPT

## 2023-08-31 RX ORDER — SODIUM CHLORIDE 9 MG/ML
2500 INJECTION INTRAMUSCULAR; INTRAVENOUS; SUBCUTANEOUS ONCE
Refills: 0 | Status: COMPLETED | OUTPATIENT
Start: 2023-08-31 | End: 2023-08-31

## 2023-08-31 RX ADMIN — SODIUM CHLORIDE 2500 MILLILITER(S): 9 INJECTION INTRAMUSCULAR; INTRAVENOUS; SUBCUTANEOUS at 21:31

## 2023-08-31 NOTE — ED ADULT NURSE NOTE - CHIEF COMPLAINT QUOTE
MARY from [facility] for hypotension (90s/60s), weakness and dizziness X yesterday  Pt had maintenance IVF X3 days in facility    Meds: losartan, levothyroxine, gabapentin, coq10, atorvastatin, Eliquis, aspirin MARY from [facility] for hypotension (90s/60s), weakness and dizziness X yesterday  Pt had maintenance IVF X3 days in facility    Meds: losartan, levothyroxine, gabapentin, coq10, atorvastatin, Eliquis, aspirin MARY from [facility] for hypotension (90s/60s), weakness and dizziness X yesterday  Pt had maintenance IVF X3 days in facility    Meds: losartan, levothyroxine, gabapentin, coq10, atorvastatin, Eliquis, aspirin

## 2023-08-31 NOTE — ED ADULT NURSE NOTE - NSFALLRISKINTERV_ED_ALL_ED
Assistance OOB with selected safe patient handling equipment if applicable/Assistance with ambulation/Communicate fall risk and risk factors to all staff, patient, and family/Encourage patient to sit up slowly, dangle for a short time, stand at bedside before walking/Monitor gait and stability/Orthostatic vital signs/Provide visual cue: yellow wristband, yellow gown, etc/Reinforce activity limits and safety measures with patient and family/Call bell, personal items and telephone in reach/Instruct patient to call for assistance before getting out of bed/chair/stretcher/Non-slip footwear applied when patient is off stretcher/[place] to call system/Physically safe environment - no spills, clutter or unnecessary equipment/Purposeful Proactive Rounding/Room/bathroom lighting operational, light cord in reach Assistance OOB with selected safe patient handling equipment if applicable/Assistance with ambulation/Communicate fall risk and risk factors to all staff, patient, and family/Encourage patient to sit up slowly, dangle for a short time, stand at bedside before walking/Monitor gait and stability/Orthostatic vital signs/Provide visual cue: yellow wristband, yellow gown, etc/Reinforce activity limits and safety measures with patient and family/Call bell, personal items and telephone in reach/Instruct patient to call for assistance before getting out of bed/chair/stretcher/Non-slip footwear applied when patient is off stretcher/[place] to call system/Physically safe environment - no spills, clutter or unnecessary equipment/Purposeful Proactive Rounding/Room/bathroom lighting operational, light cord in reach Assistance OOB with selected safe patient handling equipment if applicable/Assistance with ambulation/Communicate fall risk and risk factors to all staff, patient, and family/Encourage patient to sit up slowly, dangle for a short time, stand at bedside before walking/Monitor gait and stability/Orthostatic vital signs/Provide visual cue: yellow wristband, yellow gown, etc/Reinforce activity limits and safety measures with patient and family/Call bell, personal items and telephone in reach/Instruct patient to call for assistance before getting out of bed/chair/stretcher/Non-slip footwear applied when patient is off stretcher/[place] to call system/Physically safe environment - no spills, clutter or unnecessary equipment/Purposeful Proactive Rounding/Room/bathroom lighting operational, light cord in reach

## 2023-08-31 NOTE — ED PROVIDER NOTE - NS ED MD DISPO DIVISION
[facility] [place] [facility]

## 2023-08-31 NOTE — ED PROVIDER NOTE - OBJECTIVE STATEMENT
78 year old female with stage 2 cancer presents to the ED with complaints of hypotension. Patient states her blood pressure kept going down this morning. It was low in the 80s, which started since this morning, but is now getting better. Reports decreased appetite. She reports she can only eat broth. She had surgery about 1 month ago ( on August 2nd) and since then she has been feeling intermittent weakness, lightheadedness and vomiting sometimes. No coughing, no fevers, no chills, no chest pain, no trouble breathing, no trouble urinating, no runny nose, no sore throat.   Allergies:  Xanax (Hives)

## 2023-08-31 NOTE — ED ADULT NURSE NOTE - OBJECTIVE STATEMENT
Patient presented to the ED from [facility] for hypotension (90s/60s). Patient endorses weakness. Patient presented to the ED from [facility] for hypotension (90s/60s). Patient endorses weakness. Patient presented to the ED from [facility] for hypotension (90s/60s). Patient endorses weakness.

## 2023-08-31 NOTE — ED PROVIDER NOTE - IV ALTEPLASE INCLUSION HIDDEN
show
Detail Level: Detailed
Quality 110: Preventive Care And Screening: Influenza Immunization: Influenza Immunization not Administered because Patient Refused.
Quality 111:Pneumonia Vaccination Status For Older Adults: Pneumococcal Vaccination not Administered or Previously Received, Reason not Otherwise Specified

## 2023-08-31 NOTE — ED ADULT TRIAGE NOTE - CHIEF COMPLAINT QUOTE
MARY from [facility] for hypotension (90s/60s), weakness and dizziness X yesterday  Note: NH papers were lost en route to hospital    Meds: losartan, levothyroxine, gabapentin, coq10, atorvastatin, Eliquis, aspirin MARY from [facility] for hypotension (90s/60s), weakness and dizziness X yesterday  Note: NH papers were lost en route to hospital    Meds: losartan, levothyroxine, gabapentin, coq10, atorvastatin, Eliquis, aspirin MARY from [facility] for hypotension (90s/60s), weakness and dizziness X yesterday  Note: NH papers were lost en route to hospital    Meds: losartan, levothyroxine, gabapentin, coq10, atorvastatin, Eliquis, aspirin MARY from [facility] for hypotension (90s/60s), weakness and dizziness X yesterday  Pt had maintenance IVF X3 days in facility    Meds: losartan, levothyroxine, gabapentin, coq10, atorvastatin, Eliquis, aspirin MARY from [facility] for hypotension (90s/60s), weakness and dizziness X yesterday  Pt had maintenance IVF X3 days in facility    Meds: losartan, levothyroxine, gabapentin, coq10, atorvastatin, Eliquis, aspirin MARY from [facility] for hypotension (90s/60s), weakness and dizziness X yesterday  Pt had maintenance IVF X3 days in facility    Meds: losartan, levothyroxine, gabapentin, coq10, atorvastatin, Eliquis, aspirin

## 2023-08-31 NOTE — ED ADULT NURSE NOTE - CAS EDP DISCH DISPOSITION ADMI
Care Suites Post Procedure Note    Patient Information  Name: Denise Bryant  Age: 36 year old    Post Procedure  Time patient returned to Care Suites: 1035  Concerns/abnormal assessment: none  If abnormal assessment, provider notified: N/A  Plan/Other: Left popliteal access with dressing CDI.  Site is soft.  Mild ecchymosis at site.  Circumference  of knee is 41 cm on return.  Rates pain at 7/10 on upper left thigh.  Analgesic given.  Will cont to monitor.  Will cont to monitor.    Nguyen Conrad RN      HOLDING/[employer]/[facility] [facility] 4S/[facility] 4S/[facility]

## 2023-08-31 NOTE — ED PROVIDER NOTE - CHPI ED SYMPTOMS NEG
no chest pain, no coughing, no trouble breathing, no trouble urinating, no runny nose, no sore throat/no fever/no chills

## 2023-08-31 NOTE — ED ADULT TRIAGE NOTE - NS ED NURSE AMBULANCES
[facility]

## 2023-09-01 DIAGNOSIS — E03.9 HYPOTHYROIDISM, UNSPECIFIED: ICD-10-CM

## 2023-09-01 DIAGNOSIS — E78.5 HYPERLIPIDEMIA, UNSPECIFIED: ICD-10-CM

## 2023-09-01 DIAGNOSIS — R19.7 DIARRHEA, UNSPECIFIED: ICD-10-CM

## 2023-09-01 DIAGNOSIS — I48.91 UNSPECIFIED ATRIAL FIBRILLATION: ICD-10-CM

## 2023-09-01 DIAGNOSIS — I10 ESSENTIAL (PRIMARY) HYPERTENSION: ICD-10-CM

## 2023-09-01 DIAGNOSIS — Z29.9 ENCOUNTER FOR PROPHYLACTIC MEASURES, UNSPECIFIED: ICD-10-CM

## 2023-09-01 DIAGNOSIS — I95.9 HYPOTENSION, UNSPECIFIED: ICD-10-CM

## 2023-09-01 DIAGNOSIS — Z90.49 ACQUIRED ABSENCE OF OTHER SPECIFIED PARTS OF DIGESTIVE TRACT: Chronic | ICD-10-CM

## 2023-09-01 DIAGNOSIS — I25.10 ATHEROSCLEROTIC HEART DISEASE OF NATIVE CORONARY ARTERY WITHOUT ANGINA PECTORIS: ICD-10-CM

## 2023-09-01 DIAGNOSIS — C18.9 MALIGNANT NEOPLASM OF COLON, UNSPECIFIED: ICD-10-CM

## 2023-09-01 LAB
ANION GAP SERPL CALC-SCNC: 9 MMOL/L — SIGNIFICANT CHANGE UP (ref 5–17)
APPEARANCE UR: ABNORMAL
BACTERIA # UR AUTO: ABNORMAL /HPF
BILIRUB UR-MCNC: NEGATIVE — SIGNIFICANT CHANGE UP
BUN SERPL-MCNC: 35 MG/DL — HIGH (ref 7–18)
CALCIUM SERPL-MCNC: 7.4 MG/DL — LOW (ref 8.4–10.5)
CHLORIDE SERPL-SCNC: 106 MMOL/L — SIGNIFICANT CHANGE UP (ref 96–108)
CO2 SERPL-SCNC: 22 MMOL/L — SIGNIFICANT CHANGE UP (ref 22–31)
COLOR SPEC: YELLOW — SIGNIFICANT CHANGE UP
COMMENT - URINE: SIGNIFICANT CHANGE UP
CREAT SERPL-MCNC: 0.79 MG/DL — SIGNIFICANT CHANGE UP (ref 0.5–1.3)
DIFF PNL FLD: NEGATIVE — SIGNIFICANT CHANGE UP
EGFR: 76 ML/MIN/1.73M2 — SIGNIFICANT CHANGE UP
EPI CELLS # UR: PRESENT
GI PCR PANEL: SIGNIFICANT CHANGE UP
GLUCOSE SERPL-MCNC: 102 MG/DL — HIGH (ref 70–99)
GLUCOSE UR QL: NEGATIVE MG/DL — SIGNIFICANT CHANGE UP
HCT VFR BLD CALC: 32.3 % — LOW (ref 34.5–45)
HGB BLD-MCNC: 10.6 G/DL — LOW (ref 11.5–15.5)
KETONES UR-MCNC: 15 MG/DL
LEUKOCYTE ESTERASE UR-ACNC: ABNORMAL
MAGNESIUM SERPL-MCNC: 1.6 MG/DL — SIGNIFICANT CHANGE UP (ref 1.6–2.6)
MCHC RBC-ENTMCNC: 28.3 PG — SIGNIFICANT CHANGE UP (ref 27–34)
MCHC RBC-ENTMCNC: 32.8 GM/DL — SIGNIFICANT CHANGE UP (ref 32–36)
MCV RBC AUTO: 86.4 FL — SIGNIFICANT CHANGE UP (ref 80–100)
NITRITE UR-MCNC: NEGATIVE — SIGNIFICANT CHANGE UP
NRBC # BLD: 0 /100 WBCS — SIGNIFICANT CHANGE UP (ref 0–0)
PH UR: 5 — SIGNIFICANT CHANGE UP (ref 5–8)
PHOSPHATE SERPL-MCNC: 2.8 MG/DL — SIGNIFICANT CHANGE UP (ref 2.5–4.5)
PLATELET # BLD AUTO: 495 K/UL — HIGH (ref 150–400)
POTASSIUM SERPL-MCNC: 3.5 MMOL/L — SIGNIFICANT CHANGE UP (ref 3.5–5.3)
POTASSIUM SERPL-SCNC: 3.5 MMOL/L — SIGNIFICANT CHANGE UP (ref 3.5–5.3)
PROT UR-MCNC: ABNORMAL MG/DL
RBC # BLD: 3.74 M/UL — LOW (ref 3.8–5.2)
RBC # FLD: 15.7 % — HIGH (ref 10.3–14.5)
RBC CASTS # UR COMP ASSIST: 0 /HPF — SIGNIFICANT CHANGE UP (ref 0–4)
SODIUM SERPL-SCNC: 137 MMOL/L — SIGNIFICANT CHANGE UP (ref 135–145)
SP GR SPEC: 1.02 — SIGNIFICANT CHANGE UP (ref 1–1.03)
UROBILINOGEN FLD QL: 0.2 MG/DL — SIGNIFICANT CHANGE UP (ref 0.2–1)
WBC # BLD: 7.36 K/UL — SIGNIFICANT CHANGE UP (ref 3.8–10.5)
WBC # FLD AUTO: 7.36 K/UL — SIGNIFICANT CHANGE UP (ref 3.8–10.5)
WBC UR QL: 4 /HPF — SIGNIFICANT CHANGE UP (ref 0–5)

## 2023-09-01 PROCEDURE — 99222 1ST HOSP IP/OBS MODERATE 55: CPT

## 2023-09-01 PROCEDURE — 74176 CT ABD & PELVIS W/O CONTRAST: CPT | Mod: 26

## 2023-09-01 RX ORDER — OXYBUTYNIN CHLORIDE 5 MG
10 TABLET ORAL DAILY
Refills: 0 | Status: DISCONTINUED | OUTPATIENT
Start: 2023-09-01 | End: 2023-09-07

## 2023-09-01 RX ORDER — LINEZOLID 600 MG/300ML
INJECTION, SOLUTION INTRAVENOUS
Refills: 0 | Status: DISCONTINUED | OUTPATIENT
Start: 2023-09-01 | End: 2023-09-02

## 2023-09-01 RX ORDER — ATORVASTATIN CALCIUM 80 MG/1
20 TABLET, FILM COATED ORAL AT BEDTIME
Refills: 0 | Status: DISCONTINUED | OUTPATIENT
Start: 2023-09-01 | End: 2023-09-08

## 2023-09-01 RX ORDER — ALPRAZOLAM 0.25 MG
0.25 TABLET ORAL
Refills: 0 | Status: DISCONTINUED | OUTPATIENT
Start: 2023-09-01 | End: 2023-09-06

## 2023-09-01 RX ORDER — PIPERACILLIN AND TAZOBACTAM 4; .5 G/20ML; G/20ML
3.38 INJECTION, POWDER, LYOPHILIZED, FOR SOLUTION INTRAVENOUS ONCE
Refills: 0 | Status: COMPLETED | OUTPATIENT
Start: 2023-09-01 | End: 2023-09-01

## 2023-09-01 RX ORDER — GABAPENTIN 400 MG/1
100 CAPSULE ORAL EVERY 8 HOURS
Refills: 0 | Status: DISCONTINUED | OUTPATIENT
Start: 2023-09-01 | End: 2023-09-09

## 2023-09-01 RX ORDER — ACETAMINOPHEN 500 MG
650 TABLET ORAL EVERY 6 HOURS
Refills: 0 | Status: DISCONTINUED | OUTPATIENT
Start: 2023-09-01 | End: 2023-09-09

## 2023-09-01 RX ORDER — LINEZOLID 600 MG/300ML
600 INJECTION, SOLUTION INTRAVENOUS ONCE
Refills: 0 | Status: COMPLETED | OUTPATIENT
Start: 2023-09-01 | End: 2023-09-01

## 2023-09-01 RX ORDER — ONDANSETRON 8 MG/1
4 TABLET, FILM COATED ORAL EVERY 8 HOURS
Refills: 0 | Status: DISCONTINUED | OUTPATIENT
Start: 2023-09-01 | End: 2023-09-01

## 2023-09-01 RX ORDER — DIPHENHYDRAMINE HCL 50 MG
25 CAPSULE ORAL ONCE
Refills: 0 | Status: COMPLETED | OUTPATIENT
Start: 2023-09-01 | End: 2023-09-01

## 2023-09-01 RX ORDER — PIPERACILLIN AND TAZOBACTAM 4; .5 G/20ML; G/20ML
3.38 INJECTION, POWDER, LYOPHILIZED, FOR SOLUTION INTRAVENOUS EVERY 8 HOURS
Refills: 0 | Status: DISCONTINUED | OUTPATIENT
Start: 2023-09-02 | End: 2023-09-02

## 2023-09-01 RX ORDER — ALBUTEROL 90 UG/1
2 AEROSOL, METERED ORAL EVERY 6 HOURS
Refills: 0 | Status: DISCONTINUED | OUTPATIENT
Start: 2023-09-01 | End: 2023-09-09

## 2023-09-01 RX ORDER — SODIUM CHLORIDE 9 MG/ML
1000 INJECTION INTRAMUSCULAR; INTRAVENOUS; SUBCUTANEOUS ONCE
Refills: 0 | Status: COMPLETED | OUTPATIENT
Start: 2023-09-01 | End: 2023-09-01

## 2023-09-01 RX ORDER — HYDROCORTISONE 20 MG
60 TABLET ORAL ONCE
Refills: 0 | Status: COMPLETED | OUTPATIENT
Start: 2023-09-01 | End: 2023-09-01

## 2023-09-01 RX ORDER — VENLAFAXINE HCL 75 MG
75 CAPSULE, EXT RELEASE 24 HR ORAL DAILY
Refills: 0 | Status: DISCONTINUED | OUTPATIENT
Start: 2023-09-01 | End: 2023-09-01

## 2023-09-01 RX ORDER — ASPIRIN/CALCIUM CARB/MAGNESIUM 324 MG
81 TABLET ORAL DAILY
Refills: 0 | Status: DISCONTINUED | OUTPATIENT
Start: 2023-09-01 | End: 2023-09-09

## 2023-09-01 RX ORDER — LINEZOLID 600 MG/300ML
600 INJECTION, SOLUTION INTRAVENOUS EVERY 12 HOURS
Refills: 0 | Status: DISCONTINUED | OUTPATIENT
Start: 2023-09-02 | End: 2023-09-02

## 2023-09-01 RX ORDER — LEVOTHYROXINE SODIUM 125 MCG
88 TABLET ORAL DAILY
Refills: 0 | Status: DISCONTINUED | OUTPATIENT
Start: 2023-09-01 | End: 2023-09-08

## 2023-09-01 RX ORDER — PANTOPRAZOLE SODIUM 20 MG/1
40 TABLET, DELAYED RELEASE ORAL
Refills: 0 | Status: DISCONTINUED | OUTPATIENT
Start: 2023-09-01 | End: 2023-09-09

## 2023-09-01 RX ORDER — LEVETIRACETAM 250 MG/1
750 TABLET, FILM COATED ORAL
Refills: 0 | Status: DISCONTINUED | OUTPATIENT
Start: 2023-09-01 | End: 2023-09-08

## 2023-09-01 RX ORDER — SODIUM CHLORIDE 9 MG/ML
1000 INJECTION, SOLUTION INTRAVENOUS
Refills: 0 | Status: DISCONTINUED | OUTPATIENT
Start: 2023-09-01 | End: 2023-09-02

## 2023-09-01 RX ORDER — LANOLIN ALCOHOL/MO/W.PET/CERES
5 CREAM (GRAM) TOPICAL AT BEDTIME
Refills: 0 | Status: DISCONTINUED | OUTPATIENT
Start: 2023-09-01 | End: 2023-09-09

## 2023-09-01 RX ORDER — DIATRIZOATE MEGLUMINE 180 MG/ML
30 INJECTION, SOLUTION INTRAVESICAL ONCE
Refills: 0 | Status: COMPLETED | OUTPATIENT
Start: 2023-09-01 | End: 2023-09-01

## 2023-09-01 RX ORDER — FAMOTIDINE 10 MG/ML
20 INJECTION INTRAVENOUS ONCE
Refills: 0 | Status: COMPLETED | OUTPATIENT
Start: 2023-09-01 | End: 2023-09-01

## 2023-09-01 RX ORDER — APIXABAN 2.5 MG/1
5 TABLET, FILM COATED ORAL EVERY 12 HOURS
Refills: 0 | Status: DISCONTINUED | OUTPATIENT
Start: 2023-09-01 | End: 2023-09-09

## 2023-09-01 RX ADMIN — Medication 88 MICROGRAM(S): at 07:19

## 2023-09-01 RX ADMIN — ATORVASTATIN CALCIUM 20 MILLIGRAM(S): 80 TABLET, FILM COATED ORAL at 21:57

## 2023-09-01 RX ADMIN — Medication 650 MILLIGRAM(S): at 06:35

## 2023-09-01 RX ADMIN — GABAPENTIN 100 MILLIGRAM(S): 400 CAPSULE ORAL at 06:36

## 2023-09-01 RX ADMIN — SODIUM CHLORIDE 80 MILLILITER(S): 9 INJECTION, SOLUTION INTRAVENOUS at 07:22

## 2023-09-01 RX ADMIN — SODIUM CHLORIDE 80 MILLILITER(S): 9 INJECTION, SOLUTION INTRAVENOUS at 04:14

## 2023-09-01 RX ADMIN — Medication 60 MILLIGRAM(S): at 20:57

## 2023-09-01 RX ADMIN — APIXABAN 5 MILLIGRAM(S): 2.5 TABLET, FILM COATED ORAL at 06:36

## 2023-09-01 RX ADMIN — Medication 25 MILLIGRAM(S): at 20:50

## 2023-09-01 RX ADMIN — FAMOTIDINE 20 MILLIGRAM(S): 10 INJECTION INTRAVENOUS at 20:57

## 2023-09-01 RX ADMIN — Medication 650 MILLIGRAM(S): at 07:30

## 2023-09-01 RX ADMIN — Medication 10 MILLIGRAM(S): at 11:30

## 2023-09-01 RX ADMIN — Medication 5 MILLIGRAM(S): at 21:57

## 2023-09-01 RX ADMIN — Medication 75 MILLIGRAM(S): at 11:29

## 2023-09-01 RX ADMIN — APIXABAN 5 MILLIGRAM(S): 2.5 TABLET, FILM COATED ORAL at 17:28

## 2023-09-01 RX ADMIN — GABAPENTIN 100 MILLIGRAM(S): 400 CAPSULE ORAL at 21:57

## 2023-09-01 RX ADMIN — LEVETIRACETAM 750 MILLIGRAM(S): 250 TABLET, FILM COATED ORAL at 07:19

## 2023-09-01 RX ADMIN — PIPERACILLIN AND TAZOBACTAM 200 GRAM(S): 4; .5 INJECTION, POWDER, LYOPHILIZED, FOR SOLUTION INTRAVENOUS at 19:33

## 2023-09-01 RX ADMIN — DIATRIZOATE MEGLUMINE 30 MILLILITER(S): 180 INJECTION, SOLUTION INTRAVESICAL at 12:56

## 2023-09-01 RX ADMIN — LINEZOLID 300 MILLIGRAM(S): 600 INJECTION, SOLUTION INTRAVENOUS at 23:54

## 2023-09-01 RX ADMIN — SODIUM CHLORIDE 1000 MILLILITER(S): 9 INJECTION INTRAMUSCULAR; INTRAVENOUS; SUBCUTANEOUS at 01:11

## 2023-09-01 RX ADMIN — Medication 81 MILLIGRAM(S): at 11:29

## 2023-09-01 RX ADMIN — LEVETIRACETAM 750 MILLIGRAM(S): 250 TABLET, FILM COATED ORAL at 17:28

## 2023-09-01 RX ADMIN — PANTOPRAZOLE SODIUM 40 MILLIGRAM(S): 20 TABLET, DELAYED RELEASE ORAL at 06:36

## 2023-09-01 NOTE — ADVANCED PRACTICE NURSE CONSULT - ASSESSMENT
This is a 78yr old female patient admitted for Hypotension, presenting with a Stage 1 Pressure Injury to the Coccyx and Bilateral Heels, as evident by non-blanchable erythema

## 2023-09-01 NOTE — CONSULT NOTE ADULT - ASSESSMENT
1. Diarrhea in thi patient is most likely due to colon resection  2. R/o infectious colitis  3. R/o C. Diff colitis  4. Anemia  5. No evidence of acute GI bleeding  6. H/o colon cancer    Suggestions:    1. Check stool for culture and C. Diff  2. Continue antibiotics  3. CT-Scan of abdomen and pelvis  4. Monitor electrolytes  5. IVF hydration  6. Diet as tolerated  7. Monitor H/H  8. Transfuse PRBC as needed  9. Check CEA  10. Oncology follow up  11. Avoid NSAID  12. Protonix daily  13. ID evaluation  14. DVT prophylaxis

## 2023-09-01 NOTE — CHART NOTE - NSCHARTNOTEFT_GEN_A_CORE
MEDICINE NP    Notified by RN patient with ABX transfusion reaction . Seen and examined patient at bedside. Patient is alert, NAD. Denies HA, CP, SOB, cough, N/V, or abd pain. Patient has diffuse patchy reddened areas on arms, torso and face with swollen upper lip. patient given Pepcid, benadryl and solu-cortef. Patient condition improved discuss patient clinical picture with DR. Schreiber and Dr. Monreal. Dr. Monreal suggest once all objective symptoms lina to proceed with Zyvox.     VITAL SIGNS:  T(C): 36.3 (09-02-23 @ 05:22), Max: 36.9 (09-01-23 @ 06:26)  HR: 89 (09-02-23 @ 05:22) (82 - 90)  BP: 128/84 (09-02-23 @ 05:22) (97/58 - 133/78)  RR: 18 (09-02-23 @ 05:22) (18 - 19)  SpO2: 96% (09-02-23 @ 05:22) (94% - 98%)  Wt(kg): --      LABORATORY:                          10.6   7.36  )-----------( 495      ( 01 Sep 2023 08:24 )             32.3       09-01    137  |  106  |  35<H>  ----------------------------<  102<H>  3.5   |  22  |  0.79    Ca    7.4<L>      01 Sep 2023 08:24  Phos  2.8     09-01  Mg     1.6     09-01    TPro  6.0  /  Alb  2.1<L>  /  TBili  0.4  /  DBili  x   /  AST  8<L>  /  ALT  23  /  AlkPhos  103  08-31          MICROBIOLOGY:           RADIOLOGY:          PHYSICAL EXAM:    Constitutional: AOx3. NAD.    Respiratory: clear lungs bilaterally. No wheezing, rhonchi, or crackles.    Cardiovascular: S1 S2. No murmurs.    Gastrointestinal: BS X4 active. soft. nontender.    Extremities/Vascular: +2 pulses bilaterally. No BLE edema.      ASSESSMENT/PLAN:     Patient is a 77 y/o F with PMH transverse colon neoplasm s/p colectomy in [facility] in early August, HTN, HLD, CAD s/p C with TALI to RCA, Atrial fibrillation s/p watchman implant (3/23), hypothyroidism, PUJA (not on cpap), TIA. Patient reports that since yesterday she feels weak and tired. Patient also reports that she feels lightheaded more than usual and feels lethargic. Patient reports when her blood pressure was checked in the facility it was in the low 80s. Patient reports that since her surgery earlier in August she is not eating much at all. She reports that since the surgery she has had diarrhea. Patient reports she used to have 5-6 episodes of loose watery bowel movements every day and now have improved to 2-3 bowel movements daily. Patient reports due to fear of worsening diarrhea she has decreased diet and oral intake. Patient reports she only takes soup and anything heavy makes her diarrhea worse. Patient denies any nausea, vomiting, constipation associated with it. Patient reports that she feels dizzy and lightheaded when she stands up from a sitting position that has worsened since yesterday. Patient also denies any recent fevers, chills, headache, chest pain, palpitations, shortness of breath, cough, nausea, vomiting, constipation, melena, hematochezia, dysuria, urinary frequency, or urgency. Patient denies any other complains at this time.  Antibiotic reaction      1) ABX reaction (Zosyn)  -Benadryl  -Pepcid  -solu-cortef  -O2 supplementations  -F/U primary team in AM MEDICINE NP    Notified by RN patient with ABX transfusion reaction . Seen and examined patient at bedside. Patient is alert, NAD. Denies HA, CP, SOB, cough, N/V, or abd pain. Patient has diffuse patchy reddened areas on arms, torso and face with swollen upper lip. patient given Pepcid, benadryl and solu-cortef. Patient condition improved discuss patient clinical picture with DR. Schreiber and Dr. Monreal. Dr. Monreal suggest once all objective symptoms lina to proceed with Zyvox.     VITAL SIGNS:  T(C): 36.3 (09-02-23 @ 05:22), Max: 36.9 (09-01-23 @ 06:26)  HR: 89 (09-02-23 @ 05:22) (82 - 90)  BP: 128/84 (09-02-23 @ 05:22) (97/58 - 133/78)  RR: 18 (09-02-23 @ 05:22) (18 - 19)  SpO2: 96% (09-02-23 @ 05:22) (94% - 98%)  Wt(kg): --      LABORATORY:                          10.6   7.36  )-----------( 495      ( 01 Sep 2023 08:24 )             32.3       09-01    137  |  106  |  35<H>  ----------------------------<  102<H>  3.5   |  22  |  0.79    Ca    7.4<L>      01 Sep 2023 08:24  Phos  2.8     09-01  Mg     1.6     09-01    TPro  6.0  /  Alb  2.1<L>  /  TBili  0.4  /  DBili  x   /  AST  8<L>  /  ALT  23  /  AlkPhos  103  08-31          MICROBIOLOGY:           RADIOLOGY:          PHYSICAL EXAM:    Constitutional: AOx3. NAD.    Respiratory: clear lungs bilaterally. No wheezing, rhonchi, or crackles.    Cardiovascular: S1 S2. No murmurs.    Gastrointestinal: BS X4 active. soft. nontender.    Extremities/Vascular: +2 pulses bilaterally. No BLE edema.      ASSESSMENT/PLAN:     Patient is a 77 y/o F with PMH transverse colon neoplasm s/p colectomy in [facility] in early August, HTN, HLD, CAD s/p C with TALI to RCA, Atrial fibrillation s/p watchman implant (3/23), hypothyroidism, PUJA (not on cpap), TIA. Patient reports that since yesterday she feels weak and tired. Patient also reports that she feels lightheaded more than usual and feels lethargic. Patient reports when her blood pressure was checked in the facility it was in the low 80s. Patient reports that since her surgery earlier in August she is not eating much at all. She reports that since the surgery she has had diarrhea. Patient reports she used to have 5-6 episodes of loose watery bowel movements every day and now have improved to 2-3 bowel movements daily. Patient reports due to fear of worsening diarrhea she has decreased diet and oral intake. Patient reports she only takes soup and anything heavy makes her diarrhea worse. Patient denies any nausea, vomiting, constipation associated with it. Patient reports that she feels dizzy and lightheaded when she stands up from a sitting position that has worsened since yesterday. Patient also denies any recent fevers, chills, headache, chest pain, palpitations, shortness of breath, cough, nausea, vomiting, constipation, melena, hematochezia, dysuria, urinary frequency, or urgency. Patient denies any other complains at this time.  Antibiotic reaction      1) ABX reaction (Zosyn)  -Benadryl  -Pepcid  -solu-cortef  -O2 supplementations  -F/U primary team in AM MEDICINE NP    Notified by RN patient with ABX transfusion reaction . Seen and examined patient at bedside. Patient is alert, NAD. Denies HA, CP, SOB, cough, N/V, or abd pain. Patient has diffuse patchy reddened areas on arms, torso and face with swollen upper lip. patient given Pepcid, benadryl and solu-cortef. Patient condition improved discuss patient clinical picture with DR. Schreiber and Dr. Monreal. Dr. Monreal suggest once all objective symptoms lina to proceed with Zyvox.     VITAL SIGNS:  T(C): 36.3 (09-02-23 @ 05:22), Max: 36.9 (09-01-23 @ 06:26)  HR: 89 (09-02-23 @ 05:22) (82 - 90)  BP: 128/84 (09-02-23 @ 05:22) (97/58 - 133/78)  RR: 18 (09-02-23 @ 05:22) (18 - 19)  SpO2: 96% (09-02-23 @ 05:22) (94% - 98%)  Wt(kg): --      LABORATORY:                          10.6   7.36  )-----------( 495      ( 01 Sep 2023 08:24 )             32.3       09-01    137  |  106  |  35<H>  ----------------------------<  102<H>  3.5   |  22  |  0.79    Ca    7.4<L>      01 Sep 2023 08:24  Phos  2.8     09-01  Mg     1.6     09-01    TPro  6.0  /  Alb  2.1<L>  /  TBili  0.4  /  DBili  x   /  AST  8<L>  /  ALT  23  /  AlkPhos  103  08-31          MICROBIOLOGY:           RADIOLOGY:          PHYSICAL EXAM:    Constitutional: AOx3. NAD.    Respiratory: clear lungs bilaterally. No wheezing, rhonchi, or crackles.    Cardiovascular: S1 S2. No murmurs.    Gastrointestinal: BS X4 active. soft. nontender.    Extremities/Vascular: +2 pulses bilaterally. No BLE edema.      ASSESSMENT/PLAN:     Patient is a 77 y/o F with PMH transverse colon neoplasm s/p colectomy in [facility] in early August, HTN, HLD, CAD s/p C with TALI to RCA, Atrial fibrillation s/p watchman implant (3/23), hypothyroidism, PUJA (not on cpap), TIA. Patient reports that since yesterday she feels weak and tired. Patient also reports that she feels lightheaded more than usual and feels lethargic. Patient reports when her blood pressure was checked in the facility it was in the low 80s. Patient reports that since her surgery earlier in August she is not eating much at all. She reports that since the surgery she has had diarrhea. Patient reports she used to have 5-6 episodes of loose watery bowel movements every day and now have improved to 2-3 bowel movements daily. Patient reports due to fear of worsening diarrhea she has decreased diet and oral intake. Patient reports she only takes soup and anything heavy makes her diarrhea worse. Patient denies any nausea, vomiting, constipation associated with it. Patient reports that she feels dizzy and lightheaded when she stands up from a sitting position that has worsened since yesterday. Patient also denies any recent fevers, chills, headache, chest pain, palpitations, shortness of breath, cough, nausea, vomiting, constipation, melena, hematochezia, dysuria, urinary frequency, or urgency. Patient denies any other complains at this time.  Antibiotic reaction      1) ABX reaction (Zosyn)  -Benadryl  -Pepcid  -solu-cortef  -O2 supplementations  -F/U primary team in AM

## 2023-09-01 NOTE — H&P ADULT - PROBLEM SELECTOR PLAN 2
P/w diarrhea since early august s/p colectomy surgery.  Patient on imodium TID at nursing home.   Hold imodium to see stool frequency without medication.   F/U GI PCR, ova and parasite, and stool culture.   C/W IV fluids.   C/w full liquid diet. Advance as tolerated. P/w diarrhea since early august s/p colectomy surgery.  Patient on imodium TID at nursing home.   Hold imodium to see stool frequency without medication.   F/U GI PCR, ova and parasite, and stool culture.   C/W IV fluids.   C/w full liquid diet. Advance as tolerated.  ID consulted - Dr Monreal

## 2023-09-01 NOTE — H&P ADULT - HISTORY OF PRESENT ILLNESS
Patient is a 77 y/o F with PMH transverse colon neoplasm s/p colectomy in [facility] 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 Patient reports that since yesterday she feels weak and tired. Patient also reports that she feels lightheaded more than usual and feels lethargic. Patient reports when her blood pressure was checked in the facility it was in the low 80s. Patient reports that since her surgery earlier in August she is not eating much at all. She reports that since the surgery she has had diarrhea. Patient reports she used to have 5-6 episodes of loose watery bowel movements every day and now have improved to 2-3 bowel movements daily. Patient reports due to fear of worsening diarrhea she has decreased diet and oral intake. Patient reports she only takes soup and anything heavy makes her diarrhea worse. Patient denies any nausea, vomiting, constipation associated with it. Patient reports that she feels dizzy and lightheaded when she stands up from a sitting position that has wosened since yesteday. Patient also denies any recent fevers, chills, headache, chest pain, palpitations, shortness of breath, cough, nausea, vomiting, constipation, melena, hematochezia, dysuria, urinary frequency, or urgency. Patient denies any other complains at this time.  Patient is a 77 y/o F with PMH transverse colon neoplasm s/p colectomy in [facility] 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 Patient reports that since yesterday she feels weak and tired. Patient also reports that she feels lightheaded more than usual and feels lethargic. Patient reports when her blood pressure was checked in the facility it was in the low 80s. Patient reports that since her surgery earlier in August she is not eating much at all. She reports that since the surgery she has had diarrhea. Patient reports she used to have 5-6 episodes of loose watery bowel movements every day and now have improved to 2-3 bowel movements daily. Patient reports due to fear of worsening diarrhea she has decreased diet and oral intake. Patient reports she only takes soup and anything heavy makes her diarrhea worse. Patient denies any nausea, vomiting, constipation associated with it. Patient reports that she feels dizzy and lightheaded when she stands up from a sitting position that has wosened since yesteday. Patient also denies any recent fevers, chills, headache, chest pain, palpitations, shortness of breath, cough, nausea, vomiting, constipation, melena, hematochezia, dysuria, urinary frequency, or urgency. Patient denies any other complains at this time.  Patient is a 77 y/o F with PMH transverse colon neoplasm s/p colectomy in [facility] 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 Patient reports that since yesterday she feels weak and tired. Patient also reports that she feels lightheaded more than usual and feels lethargic. Patient reports when her blood pressure was checked in the facility it was in the low 80s. Patient reports that since her surgery earlier in August she is not eating much at all. She reports that since the surgery she has had diarrhea. Patient reports she used to have 5-6 episodes of loose watery bowel movements every day and now have improved to 2-3 bowel movements daily. Patient reports due to fear of worsening diarrhea she has decreased diet and oral intake. Patient reports she only takes soup and anything heavy makes her diarrhea worse. Patient denies any nausea, vomiting, constipation associated with it. Patient reports that she feels dizzy and lightheaded when she stands up from a sitting position that has wosened since yesteday. Patient also denies any recent fevers, chills, headache, chest pain, palpitations, shortness of breath, cough, nausea, vomiting, constipation, melena, hematochezia, dysuria, urinary frequency, or urgency. Patient denies any other complains at this time.  Patient is a 79 y/o F with PMH transverse colon neoplasm s/p colectomy in [facility] 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 Patient reports that since yesterday she feels weak and tired. Patient also reports that she feels lightheaded more than usual and feels lethargic. Patient reports when her blood pressure was checked in the facility it was in the low 80s. Patient reports that since her surgery earlier in August she is not eating much at all. She reports that since the surgery she has had diarrhea. Patient reports she used to have 5-6 episodes of loose watery bowel movements every day and now have improved to 2-3 bowel movements daily. Patient reports due to fear of worsening diarrhea she has decreased diet and oral intake. Patient reports she only takes soup and anything heavy makes her diarrhea worse. Patient denies any nausea, vomiting, constipation associated with it. Patient reports that she feels dizzy and lightheaded when she stands up from a sitting position that has worsened since yesterday. Patient also denies any recent fevers, chills, headache, chest pain, palpitations, shortness of breath, cough, nausea, vomiting, constipation, melena, hematochezia, dysuria, urinary frequency, or urgency. Patient denies any other complains at this time.  Patient is a 79 y/o F with PMH transverse colon neoplasm s/p colectomy in [facility] 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 Patient reports that since yesterday she feels weak and tired. Patient also reports that she feels lightheaded more than usual and feels lethargic. Patient reports when her blood pressure was checked in the facility it was in the low 80s. Patient reports that since her surgery earlier in August she is not eating much at all. She reports that since the surgery she has had diarrhea. Patient reports she used to have 5-6 episodes of loose watery bowel movements every day and now have improved to 2-3 bowel movements daily. Patient reports due to fear of worsening diarrhea she has decreased diet and oral intake. Patient reports she only takes soup and anything heavy makes her diarrhea worse. Patient denies any nausea, vomiting, constipation associated with it. Patient reports that she feels dizzy and lightheaded when she stands up from a sitting position that has worsened since yesterday. Patient also denies any recent fevers, chills, headache, chest pain, palpitations, shortness of breath, cough, nausea, vomiting, constipation, melena, hematochezia, dysuria, urinary frequency, or urgency. Patient denies any other complains at this time.  Patient is a 79 y/o F with PMH transverse colon neoplasm s/p colectomy in [facility] 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 Patient reports that since yesterday she feels weak and tired. Patient also reports that she feels lightheaded more than usual and feels lethargic. Patient reports when her blood pressure was checked in the facility it was in the low 80s. Patient reports that since her surgery earlier in August she is not eating much at all. She reports that since the surgery she has had diarrhea. Patient reports she used to have 5-6 episodes of loose watery bowel movements every day and now have improved to 2-3 bowel movements daily. Patient reports due to fear of worsening diarrhea she has decreased diet and oral intake. Patient reports she only takes soup and anything heavy makes her diarrhea worse. Patient denies any nausea, vomiting, constipation associated with it. Patient reports that she feels dizzy and lightheaded when she stands up from a sitting position that has worsened since yesterday. Patient also denies any recent fevers, chills, headache, chest pain, palpitations, shortness of breath, cough, nausea, vomiting, constipation, melena, hematochezia, dysuria, urinary frequency, or urgency. Patient denies any other complains at this time.

## 2023-09-01 NOTE — CHART NOTE - NSCHARTNOTEFT_GEN_A_CORE
EVENT:  Pt seen an examined     SUBJECTIVE: Pt denies HA, dizziness, SOB, CP or abdominal pain.  Reports to walk w/ a walker.  Reports chronic h/o diarrhea, "that's what I do."  NP discussed pt's allergies.  Denies allergy to Xanax, any other medications  or food.      OBJECTIVE  REVIEW OF SYSTEMS:    CONSTITUTIONAL: No fever  EYES: No acute visual disturbances  NECK: No pain or stiffness  RESPIRATORY: No cough; No shortness of breath  CARDIOVASCULAR: No chest pain, no palpitations  GASTROINTESTINAL: No pain. No nausea or vomiting.  Reported chronic diarrhea, last this AM x 1.     NEUROLOGICAL: No headache or numbness, no tremors  MUSCULOSKELETAL: No joint pain, no muscle pain  GENITOURINARY: No dysuria, no frequency, no hesitancy  PSYCHIATRY: No depression , no anxiety  ALL OTHER  ROS negative      PHYSICAL EXAM:  GENERAL: NAD  HEENT: Normocephalic;  conjunctivae and sclerae clear; moist mucous membranes;   NECK : Supple  CHEST/LUNG: Clear to auscultation bilaterally with good air entry   HEART: S1 S2  regular; no murmurs, gallops or rubs  ABDOMEN: Obese, Soft, Nontender to palpation, Nondistended; Bowel sounds present x 4 quad.  No rebound or guarding noted.    EXTREMITIES: No cyanosis; no edema; no calf tenderness  SKIN: Warm and dry; no rash  NERVOUS SYSTEM:  Awake and alert; Oriented  to place & person, not time ; no new deficits      Vital Signs Last 24 Hrs  T(C): 36.5 (01 Sep 2023 12:26), Max: 37.2 (01 Sep 2023 00:30)  T(F): 97.7 (01 Sep 2023 12:26), Max: 99 (01 Sep 2023 00:30)  HR: 82 (01 Sep 2023 12:26) (78 - 90)  BP: 106/70 (01 Sep 2023 12:26) (93/62 - 109/50)  RR: 18 (01 Sep 2023 12:26) (18 - 19)  SpO2: 96% (01 Sep 2023 12:26) (95% - 98%)    Parameters below as of 01 Sep 2023 12:26  Patient On (Oxygen Delivery Method): room air  O2 Flow (L/min): 1      LABS:                        10.6   7.36  )-----------( 495      ( 01 Sep 2023 08:24 )             32.3     09-01    137  |  106  |  35<H>  ----------------------------<  102<H>  3.5   |  22  |  0.79    Ca    7.4<L>      01 Sep 2023 08:24  Phos  2.8     09-01  Mg     1.6     09-01    TPro  6.0  /  Alb  2.1<L>  /  TBili  0.4  /  DBili  x   /  AST  8<L>  /  ALT  23  /  AlkPhos  103  08-31      EKG:   IMAGING:    ASSESSMENT:    78 year old, Female, from [facility], with PMH of Transverse colon neoplasm s/p colectomy in [facility] in early August, HTN, HLD, CAD s/p LHC with TALI to RCA, Atrial fibrillation s/p watchman implant (3/23), hypothyroidism, PUAJ (not on cpap), & TIA.  Patient reported feeling weak and tired, lightheaded more than usual and feels lethargic.  Patient reported her blood pressure was checked in the facility and was in the low 80s.   Admitted for Hypotension possibly 2/2 to poor oral intake vs. diarrhea vs. sepsis.          Problem/Plan - 1:  ·  Problem: Hypotension.   ·  Plan:   - P/w weakness, lethargy, & dizziness (more than usual).  Likely in setting of chronic diarrhea since colectomy vs. sepsis vs. poor oral intake.   - Reported SBP at facility in low 80s.  - S/p CXR negative    - S/p IVF in ED.  Currently on IVF.  Reassess in AM.  - Monitor orthostatic VS  - Bld & U. cx's pending-f/u results   - Rest below     Problem/Plan - 2:  ·  Problem: Diarrhea.   ·  Plan:   - P/w diarrhea since early August, s/p Sx-colectomy on imodium TID at NH.   - Continue to hold Imodium.  Monitor stool frequency without medication.   - S/p GI PCR negative   - O&P, stool culture pending-g/u results  - C. Diff not sent b/c no diarrhea on floor.  Last diarrhea in ED @ 3A on 9/1.   - Maintain isolation, if no diarrhea after 24h consider [place]'ng isolation.      - Currently on liquid diet and IVF.  Reassess IVF in AM.    - MR HENRI/P w/ PO contrast pending-f/u results   - ID-Dr Monreal consult pending-f/u recommendations     Problem/Plan - 3:  ·  Problem: Colon cancer.   ·  Plan:   H/o Colon cancer s/p colectomy.  Not on chemo drugs per NH paperwork.     Problem/Plan - 4:  ·  Problem: Hypertension.   ·  Plan:   H/o HTN on Losartan   - Continue to hold antihypertensive in setting of hypotenstion.       Problem/Plan - 5:  ·  Problem: Atrial fibrillation.   ·  Plan:  - C/w Eliquis. Not on B-blocker per NH paperwork.     Problem/Plan - 6:  ·  Problem: Hyperlipidemia.   ·  Plan:   - C/w Atorvastatin     Problem/Plan - 7:  ·  Problem: CAD (coronary artery disease).   ·  Plan:   - C/w ASA & Atorvastatin        Problem/Plan - 8:  ·  Problem: Hypothyroidism.   ·  Plan:   H/o Hypothyroidism on Levothyroxine  - C/w Levothyroxine     Problem/Plan - 9:  ·  Problem: Stage 1 Pressure Injury to the Coccyx.  ·  Plan:   - C/w Wound care   - Apply a Foam dressing to the Coccyx area Q 72hrs PRN     Problem/Plan - 10:  ·  Problem: Stage 1 Pressure Injury to Bilateral Heels.  ·  Plan:   - C/w Off loading   - Elevate/float the patients heels using heel protectors and reposition the patient Q 2hrs using wedges or pillows     Problem/Plan - 11:  ·  Problem: Prophylactic measure.   ·  Plan:   - C/w Eliquis for DVT ppx   - C/w Protonix for GI ppx EVENT:  Pt seen an examined     SUBJECTIVE: Pt denies HA, dizziness, SOB, CP or abdominal pain.  Reports to walk w/ a walker.  Reports chronic h/o diarrhea, "that's what I do."  NP discussed pt's allergies.  Denies allergy to Xanax, any other medications  or food.      OBJECTIVE  REVIEW OF SYSTEMS:    CONSTITUTIONAL: No fever  EYES: No acute visual disturbances  NECK: No pain or stiffness  RESPIRATORY: No cough; No shortness of breath  CARDIOVASCULAR: No chest pain, no palpitations  GASTROINTESTINAL: No pain. No nausea or vomiting.  Reported chronic diarrhea, last this AM x 1.     NEUROLOGICAL: No headache or numbness, no tremors  MUSCULOSKELETAL: No joint pain, no muscle pain  GENITOURINARY: No dysuria, no frequency, no hesitancy  PSYCHIATRY: No depression , no anxiety  ALL OTHER  ROS negative      PHYSICAL EXAM:  GENERAL: NAD  HEENT: Normocephalic;  conjunctivae and sclerae clear; moist mucous membranes;   NECK : Supple  CHEST/LUNG: Clear to auscultation bilaterally with good air entry   HEART: S1 S2  regular; no murmurs, gallops or rubs  ABDOMEN: Obese, Soft, Nontender to palpation, Nondistended; Bowel sounds present x 4 quad.  No rebound or guarding noted.    EXTREMITIES: No cyanosis; no edema; no calf tenderness  SKIN: Warm and dry; no rash  NERVOUS SYSTEM:  Awake and alert; Oriented  to place & person, not time ; no new deficits      Vital Signs Last 24 Hrs  T(C): 36.5 (01 Sep 2023 12:26), Max: 37.2 (01 Sep 2023 00:30)  T(F): 97.7 (01 Sep 2023 12:26), Max: 99 (01 Sep 2023 00:30)  HR: 82 (01 Sep 2023 12:26) (78 - 90)  BP: 106/70 (01 Sep 2023 12:26) (93/62 - 109/50)  RR: 18 (01 Sep 2023 12:26) (18 - 19)  SpO2: 96% (01 Sep 2023 12:26) (95% - 98%)    Parameters below as of 01 Sep 2023 12:26  Patient On (Oxygen Delivery Method): room air  O2 Flow (L/min): 1      LABS:                        10.6   7.36  )-----------( 495      ( 01 Sep 2023 08:24 )             32.3     09-01    137  |  106  |  35<H>  ----------------------------<  102<H>  3.5   |  22  |  0.79    Ca    7.4<L>      01 Sep 2023 08:24  Phos  2.8     09-01  Mg     1.6     09-01    TPro  6.0  /  Alb  2.1<L>  /  TBili  0.4  /  DBili  x   /  AST  8<L>  /  ALT  23  /  AlkPhos  103  08-31      EKG:   IMAGING:    ASSESSMENT:    78 year old, Female, from [facility], with PMH of Transverse colon neoplasm s/p colectomy in [facility] in early August, HTN, HLD, CAD s/p LHC with TALI to RCA, Atrial fibrillation s/p watchman implant (3/23), hypothyroidism, PUJA (not on cpap), & TIA.  Patient reported feeling weak and tired, lightheaded more than usual and feels lethargic.  Patient reported her blood pressure was checked in the facility and was in the low 80s.   Admitted for Hypotension possibly 2/2 to poor oral intake vs. diarrhea vs. sepsis.          Problem/Plan - 1:  ·  Problem: Hypotension.   ·  Plan:   - P/w weakness, lethargy, & dizziness (more than usual).  Likely in setting of chronic diarrhea since colectomy vs. sepsis vs. poor oral intake.   - Reported SBP at facility in low 80s.  - S/p CXR negative    - S/p IVF in ED.  Currently on IVF.  Reassess in AM.  - Monitor orthostatic VS  - Bld & U. cx's pending-f/u results   - Rest below     Problem/Plan - 2:  ·  Problem: Diarrhea.   ·  Plan:   - P/w diarrhea since early August, s/p Sx-colectomy on imodium TID at NH.   - Continue to hold Imodium.  Monitor stool frequency without medication.   - S/p GI PCR negative   - O&P, stool culture pending-g/u results  - C. Diff not sent b/c no diarrhea on floor.  Last diarrhea in ED @ 3A on 9/1.   - Maintain isolation, if no diarrhea after 24h consider AL'ng isolation.      - Currently on liquid diet and IVF.  Reassess IVF in AM.    - MR HENRI/P w/ PO contrast pending-f/u results   - ID-Dr Monreal consult pending-f/u recommendations     Problem/Plan - 3:  ·  Problem: Colon cancer.   ·  Plan:   H/o Colon cancer s/p colectomy.  Not on chemo drugs per NH paperwork.     Problem/Plan - 4:  ·  Problem: Hypertension.   ·  Plan:   H/o HTN on Losartan   - Continue to hold antihypertensive in setting of hypotenstion.       Problem/Plan - 5:  ·  Problem: Atrial fibrillation.   ·  Plan:  - C/w Eliquis. Not on B-blocker per NH paperwork.     Problem/Plan - 6:  ·  Problem: Hyperlipidemia.   ·  Plan:   - C/w Atorvastatin     Problem/Plan - 7:  ·  Problem: CAD (coronary artery disease).   ·  Plan:   - C/w ASA & Atorvastatin        Problem/Plan - 8:  ·  Problem: Hypothyroidism.   ·  Plan:   H/o Hypothyroidism on Levothyroxine  - C/w Levothyroxine     Problem/Plan - 9:  ·  Problem: Stage 1 Pressure Injury to the Coccyx.  ·  Plan:   - C/w Wound care   - Apply a Foam dressing to the Coccyx area Q 72hrs PRN     Problem/Plan - 10:  ·  Problem: Stage 1 Pressure Injury to Bilateral Heels.  ·  Plan:   - C/w Off loading   - Elevate/float the patients heels using heel protectors and reposition the patient Q 2hrs using wedges or pillows     Problem/Plan - 11:  ·  Problem: Prophylactic measure.   ·  Plan:   - C/w Eliquis for DVT ppx   - C/w Protonix for GI ppx EVENT:  Pt seen an examined     SUBJECTIVE: Pt denies HA, dizziness, SOB, CP or abdominal pain.  Reports to walk w/ a walker.  Reports chronic h/o diarrhea, "that's what I do."  NP discussed pt's allergies.  Denies allergy to Xanax, any other medications  or food.      OBJECTIVE  REVIEW OF SYSTEMS:    CONSTITUTIONAL: No fever  EYES: No acute visual disturbances  NECK: No pain or stiffness  RESPIRATORY: No cough; No shortness of breath  CARDIOVASCULAR: No chest pain, no palpitations  GASTROINTESTINAL: No pain. No nausea or vomiting.  Reported chronic diarrhea, last this AM x 1.     NEUROLOGICAL: No headache or numbness, no tremors  MUSCULOSKELETAL: No joint pain, no muscle pain  GENITOURINARY: No dysuria, no frequency, no hesitancy  PSYCHIATRY: No depression , no anxiety  ALL OTHER  ROS negative      PHYSICAL EXAM:  GENERAL: NAD  HEENT: Normocephalic;  conjunctivae and sclerae clear; moist mucous membranes;   NECK : Supple  CHEST/LUNG: Clear to auscultation bilaterally with good air entry   HEART: S1 S2  regular; no murmurs, gallops or rubs  ABDOMEN: Obese, Soft, Nontender to palpation, Nondistended; Bowel sounds present x 4 quad.  No rebound or guarding noted.    EXTREMITIES: No cyanosis; no edema; no calf tenderness  SKIN: Warm and dry; no rash  NERVOUS SYSTEM:  Awake and alert; Oriented  to place & person, not time ; no new deficits      Vital Signs Last 24 Hrs  T(C): 36.5 (01 Sep 2023 12:26), Max: 37.2 (01 Sep 2023 00:30)  T(F): 97.7 (01 Sep 2023 12:26), Max: 99 (01 Sep 2023 00:30)  HR: 82 (01 Sep 2023 12:26) (78 - 90)  BP: 106/70 (01 Sep 2023 12:26) (93/62 - 109/50)  RR: 18 (01 Sep 2023 12:26) (18 - 19)  SpO2: 96% (01 Sep 2023 12:26) (95% - 98%)    Parameters below as of 01 Sep 2023 12:26  Patient On (Oxygen Delivery Method): room air  O2 Flow (L/min): 1      LABS:                        10.6   7.36  )-----------( 495      ( 01 Sep 2023 08:24 )             32.3     09-01    137  |  106  |  35<H>  ----------------------------<  102<H>  3.5   |  22  |  0.79    Ca    7.4<L>      01 Sep 2023 08:24  Phos  2.8     09-01  Mg     1.6     09-01    TPro  6.0  /  Alb  2.1<L>  /  TBili  0.4  /  DBili  x   /  AST  8<L>  /  ALT  23  /  AlkPhos  103  08-31      EKG:   IMAGING:    ASSESSMENT:    78 year old, Female, from [facility], with PMH of Transverse colon neoplasm s/p colectomy in [facility] in early August, HTN, HLD, CAD s/p LHC with TALI to RCA, Atrial fibrillation s/p watchman implant (3/23), hypothyroidism, PUJA (not on cpap), & TIA.  Patient reported feeling weak and tired, lightheaded more than usual and feels lethargic.  Patient reported her blood pressure was checked in the facility and was in the low 80s.   Admitted for Hypotension possibly 2/2 to poor oral intake vs. diarrhea vs. sepsis.          Problem/Plan - 1:  ·  Problem: Hypotension.   ·  Plan:   - P/w weakness, lethargy, & dizziness (more than usual).  Likely in setting of chronic diarrhea since colectomy vs. sepsis vs. poor oral intake.   - Reported SBP at facility in low 80s.  - S/p CXR negative    - S/p IVF in ED.  Currently on IVF.  Reassess in AM.  - Monitor orthostatic VS  - Bld & U. cx's pending-f/u results   - Rest below     Problem/Plan - 2:  ·  Problem: Diarrhea.   ·  Plan:   - P/w diarrhea since early August, s/p Sx-colectomy on imodium TID at NH.   - Continue to hold Imodium.  Monitor stool frequency without medication.   - S/p GI PCR negative   - O&P, stool culture pending-g/u results  - C. Diff not sent b/c no diarrhea on floor.  Last diarrhea in ED @ 3A on 9/1.   - Maintain isolation, if no diarrhea after 24h consider PA'ng isolation.      - Currently on liquid diet and IVF.  Reassess IVF in AM.    - MR HENRI/P w/ PO contrast pending-f/u results   - ID-Dr Monreal consult pending-f/u recommendations     Problem/Plan - 3:  ·  Problem: Colon cancer.   ·  Plan:   H/o Colon cancer s/p colectomy.  Not on chemo drugs per NH paperwork.     Problem/Plan - 4:  ·  Problem: Hypertension.   ·  Plan:   H/o HTN on Losartan   - Continue to hold antihypertensive in setting of hypotenstion.       Problem/Plan - 5:  ·  Problem: Atrial fibrillation.   ·  Plan:  - C/w Eliquis. Not on B-blocker per NH paperwork.     Problem/Plan - 6:  ·  Problem: Hyperlipidemia.   ·  Plan:   - C/w Atorvastatin     Problem/Plan - 7:  ·  Problem: CAD (coronary artery disease).   ·  Plan:   - C/w ASA & Atorvastatin        Problem/Plan - 8:  ·  Problem: Hypothyroidism.   ·  Plan:   H/o Hypothyroidism on Levothyroxine  - C/w Levothyroxine     Problem/Plan - 9:  ·  Problem: Stage 1 Pressure Injury to the Coccyx.  ·  Plan:   - C/w Wound care   - Apply a Foam dressing to the Coccyx area Q 72hrs PRN     Problem/Plan - 10:  ·  Problem: Stage 1 Pressure Injury to Bilateral Heels.  ·  Plan:   - C/w Off loading   - Elevate/float the patients heels using heel protectors and reposition the patient Q 2hrs using wedges or pillows     Problem/Plan - 11:  ·  Problem: Prophylactic measure.   ·  Plan:   - C/w Eliquis for DVT ppx   - C/w Protonix for GI ppx EVENT:  Pt seen an examined     SUBJECTIVE: Pt denies HA, dizziness, SOB, CP or abdominal pain.  Reports to walk w/ a walker.  Reports chronic h/o diarrhea, "that's what I do."  NP discussed pt's allergies.  Denies allergy to Xanax, any other medications  or food.      OBJECTIVE  REVIEW OF SYSTEMS:    CONSTITUTIONAL: No fever  EYES: No acute visual disturbances  NECK: No pain or stiffness  RESPIRATORY: No cough; No shortness of breath  CARDIOVASCULAR: No chest pain, no palpitations  GASTROINTESTINAL: No pain. No nausea or vomiting.  Reported chronic diarrhea, last this AM x 1.     NEUROLOGICAL: No headache or numbness, no tremors  MUSCULOSKELETAL: No joint pain, no muscle pain  GENITOURINARY: No dysuria, no frequency, no hesitancy  PSYCHIATRY: No depression , no anxiety  ALL OTHER  ROS negative      PHYSICAL EXAM:  GENERAL: NAD  HEENT: Normocephalic;  conjunctivae and sclerae clear; moist mucous membranes;   NECK : Supple  CHEST/LUNG: Clear to auscultation bilaterally with good air entry   HEART: S1 S2  regular; no murmurs, gallops or rubs  ABDOMEN: Obese, Soft, Nontender to palpation, Nondistended; Bowel sounds present x 4 quad.  No rebound or guarding noted.    EXTREMITIES: No cyanosis; no edema; no calf tenderness  SKIN: Warm and dry; St. 1 Coccyx, St. 1 B/l Heels.    NERVOUS SYSTEM:  Awake and alert; Oriented  to place & person, not time ; no new deficits      Vital Signs Last 24 Hrs  T(C): 36.5 (01 Sep 2023 12:26), Max: 37.2 (01 Sep 2023 00:30)  T(F): 97.7 (01 Sep 2023 12:26), Max: 99 (01 Sep 2023 00:30)  HR: 82 (01 Sep 2023 12:26) (78 - 90)  BP: 106/70 (01 Sep 2023 12:26) (93/62 - 109/50)  RR: 18 (01 Sep 2023 12:26) (18 - 19)  SpO2: 96% (01 Sep 2023 12:26) (95% - 98%)    Parameters below as of 01 Sep 2023 12:26  Patient On (Oxygen Delivery Method): room air  O2 Flow (L/min): 1      LABS:                        10.6   7.36  )-----------( 495      ( 01 Sep 2023 08:24 )             32.3     09-01    137  |  106  |  35<H>  ----------------------------<  102<H>  3.5   |  22  |  0.79    Ca    7.4<L>      01 Sep 2023 08:24  Phos  2.8     09-01  Mg     1.6     09-01    TPro  6.0  /  Alb  2.1<L>  /  TBili  0.4  /  DBili  x   /  AST  8<L>  /  ALT  23  /  AlkPhos  103  08-31      EKG:   IMAGING:    ASSESSMENT:    78 year old, Female, from [facility], with PMH of Transverse colon neoplasm s/p colectomy in [facility] in early August, HTN, HLD, CAD s/p LHC with TALI to RCA, Atrial fibrillation s/p watchman implant (3/23), hypothyroidism, PUJA (not on cpap), & TIA.  Patient reported feeling weak and tired, lightheaded more than usual and feels lethargic.  Patient reported her blood pressure was checked in the facility and was in the low 80s.   Admitted for Hypotension possibly 2/2 to poor oral intake vs. diarrhea vs. sepsis.          Problem/Plan - 1:  ·  Problem: Hypotension.   ·  Plan:   - P/w weakness, lethargy, & dizziness (more than usual).  Likely in setting of chronic diarrhea since colectomy vs. sepsis vs. poor oral intake.   - Reported SBP at facility in low 80s.  - S/p CXR negative    - S/p IVF in ED.  Currently on IVF.  Reassess in AM.  - Monitor orthostatic VS  - Bld & U. cx's pending-f/u results   - Rest below     Problem/Plan - 2:  ·  Problem: Diarrhea.   ·  Plan:   - P/w diarrhea since early August, s/p Sx-colectomy on imodium TID at NH.   - Continue to hold Imodium.  Monitor stool frequency without medication.   - S/p GI PCR negative   - O&P, stool culture pending-g/u results  - C. Diff not sent b/c no diarrhea on floor.  Last diarrhea in ED @ 3A on 9/1.   - Maintain isolation, if no diarrhea after 24h consider DC'ng isolation.      - Currently on liquid diet and IVF.  Reassess IVF in AM.    - MR A/P w/ PO contrast pending-f/u results   - ID-Dr Monreal consult pending-f/u recommendations     Problem/Plan - 3:  ·  Problem: Colon cancer.   ·  Plan:   H/o Colon cancer s/p colectomy.  Not on chemo drugs per NH paperwork.     Problem/Plan - 4:  ·  Problem: Hypertension.   ·  Plan:   H/o HTN on Losartan   - Continue to hold antihypertensive in setting of hypotenstion.       Problem/Plan - 5:  ·  Problem: Atrial fibrillation.   ·  Plan:  - C/w Eliquis. Not on B-blocker per NH paperwork.     Problem/Plan - 6:  ·  Problem: Hyperlipidemia.   ·  Plan:   - C/w Atorvastatin     Problem/Plan - 7:  ·  Problem: CAD (coronary artery disease).   ·  Plan:   - C/w ASA & Atorvastatin        Problem/Plan - 8:  ·  Problem: Hypothyroidism.   ·  Plan:   H/o Hypothyroidism on Levothyroxine  - C/w Levothyroxine     Problem/Plan - 9:  ·  Problem: Stage 1 Pressure Injury to the Coccyx.  ·  Plan:   - C/w Wound care   - Apply a Foam dressing to the Coccyx area Q 72hrs PRN     Problem/Plan - 10:  ·  Problem: Stage 1 Pressure Injury to Bilateral Heels.  ·  Plan:   - C/w Off loading   - Elevate/float the patients heels using heel protectors and reposition the patient Q 2hrs using wedges or pillows     Problem/Plan - 11:  ·  Problem: Prophylactic measure.   ·  Plan:   - C/w Eliquis for DVT ppx   - C/w Protonix for GI ppx EVENT:  Pt seen an examined     SUBJECTIVE: Pt denies HA, dizziness, SOB, CP or abdominal pain.  Reports to walk w/ a walker.  Reports chronic h/o diarrhea, "that's what I do."  NP discussed pt's allergies.  Denies allergy to Xanax, any other medications  or food.      OBJECTIVE  REVIEW OF SYSTEMS:    CONSTITUTIONAL: No fever  EYES: No acute visual disturbances  NECK: No pain or stiffness  RESPIRATORY: No cough; No shortness of breath  CARDIOVASCULAR: No chest pain, no palpitations  GASTROINTESTINAL: No pain. No nausea or vomiting.  Reported chronic diarrhea, last this AM x 1.     NEUROLOGICAL: No headache or numbness, no tremors  MUSCULOSKELETAL: No joint pain, no muscle pain  GENITOURINARY: No dysuria, no frequency, no hesitancy  PSYCHIATRY: No depression , no anxiety  ALL OTHER  ROS negative      PHYSICAL EXAM:  GENERAL: NAD  HEENT: Normocephalic;  conjunctivae and sclerae clear; moist mucous membranes;   NECK : Supple  CHEST/LUNG: Clear to auscultation bilaterally with good air entry   HEART: S1 S2  regular; no murmurs, gallops or rubs  ABDOMEN: Obese, Soft, Nontender to palpation, Nondistended; Bowel sounds present x 4 quad.  No rebound or guarding noted.    EXTREMITIES: No cyanosis; no edema; no calf tenderness  SKIN: Warm and dry; St. 1 Coccyx, St. 1 B/l Heels.    NERVOUS SYSTEM:  Awake and alert; Oriented  to place & person, not time ; no new deficits      Vital Signs Last 24 Hrs  T(C): 36.5 (01 Sep 2023 12:26), Max: 37.2 (01 Sep 2023 00:30)  T(F): 97.7 (01 Sep 2023 12:26), Max: 99 (01 Sep 2023 00:30)  HR: 82 (01 Sep 2023 12:26) (78 - 90)  BP: 106/70 (01 Sep 2023 12:26) (93/62 - 109/50)  RR: 18 (01 Sep 2023 12:26) (18 - 19)  SpO2: 96% (01 Sep 2023 12:26) (95% - 98%)    Parameters below as of 01 Sep 2023 12:26  Patient On (Oxygen Delivery Method): room air  O2 Flow (L/min): 1      LABS:                        10.6   7.36  )-----------( 495      ( 01 Sep 2023 08:24 )             32.3     09-01    137  |  106  |  35<H>  ----------------------------<  102<H>  3.5   |  22  |  0.79    Ca    7.4<L>      01 Sep 2023 08:24  Phos  2.8     09-01  Mg     1.6     09-01    TPro  6.0  /  Alb  2.1<L>  /  TBili  0.4  /  DBili  x   /  AST  8<L>  /  ALT  23  /  AlkPhos  103  08-31      EKG:   IMAGING:    ASSESSMENT:    78 year old, Female, from [facility], with PMH of Transverse colon neoplasm s/p colectomy in [facility] in early August, HTN, HLD, CAD s/p LHC with TALI to RCA, Atrial fibrillation s/p watchman implant (3/23), hypothyroidism, PUJA (not on cpap), & TIA.  Patient reported feeling weak and tired, lightheaded more than usual and feels lethargic.  Patient reported her blood pressure was checked in the facility and was in the low 80s.   Admitted for Hypotension possibly 2/2 to poor oral intake vs. diarrhea vs. sepsis.          Problem/Plan - 1:  ·  Problem: Hypotension.   ·  Plan:   - P/w weakness, lethargy, & dizziness (more than usual).  Likely in setting of chronic diarrhea since colectomy vs. sepsis vs. poor oral intake.   - Reported SBP at facility in low 80s.  - S/p CXR negative    - S/p IVF in ED.  Currently on IVF.  Reassess in AM.  - Monitor orthostatic VS  - Bld & U. cx's pending-f/u results   - Rest below     Problem/Plan - 2:  ·  Problem: Diarrhea.   ·  Plan:   - P/w diarrhea since early August, s/p Sx-colectomy on imodium TID at NH.   - Continue to hold Imodium.  Monitor stool frequency without medication.   - S/p GI PCR negative   - O&P, stool culture pending-g/u results  - C. Diff not sent b/c no diarrhea on floor.  Last diarrhea in ED @ 3A on 9/1.   - Maintain isolation, if no diarrhea after 24h consider DC'ng isolation.      - Currently on liquid diet and IVF.  Reassess IVF in AM.    - MR A/P w/ PO contrast pending-f/u results   - ID-Dr Monreal consult pending-f/u recommendations     Problem/Plan - 3:  ·  Problem: Colon cancer.   ·  Plan:   H/o Colon cancer s/p colectomy.  Not on chemo drugs per NH paperwork.     Problem/Plan - 4:  ·  Problem: Hypertension.   ·  Plan:   H/o HTN on Losartan   - Continue to hold antihypertensive in setting of hypotenstion.       Problem/Plan - 5:  ·  Problem: Atrial fibrillation.   ·  Plan:  - C/w Eliquis. Not on B-blocker per NH paperwork.     Problem/Plan - 6:  ·  Problem: Hyperlipidemia.   ·  Plan:   - C/w Atorvastatin     Problem/Plan - 7:  ·  Problem: CAD (coronary artery disease).   ·  Plan:   - C/w ASA & Atorvastatin        Problem/Plan - 8:  ·  Problem: Hypothyroidism.   ·  Plan:   H/o Hypothyroidism on Levothyroxine  - C/w Levothyroxine     Problem/Plan - 9:  ·  Problem: Stage 1 Pressure Injury to the Coccyx.  ·  Plan:   - C/w Wound care   - Apply a Foam dressing to the Coccyx area Q 72hrs PRN     Problem/Plan - 10:  ·  Problem: Stage 1 Pressure Injury to Bilateral Heels.  ·  Plan:   - C/w Off loading   - Elevate/float the patients heels using heel protectors and reposition the patient Q 2hrs using wedges or pillows     Problem/Plan - 11:  ·  Problem: Prophylactic measure.   ·  Plan:   - C/w Eliquis for DVT ppx   - C/w Protonix for GI ppx EVENT:  Pt seen an examined     SUBJECTIVE: Pt denies HA, dizziness, SOB, CP or abdominal pain.  Reports to walk w/ a walker.  Reports chronic h/o diarrhea, "that's what I do."  NP discussed pt's allergies.  Denies allergy to Xanax, any other medications  or food.      OBJECTIVE  REVIEW OF SYSTEMS:    CONSTITUTIONAL: No fever  EYES: No acute visual disturbances  NECK: No pain or stiffness  RESPIRATORY: No cough; No shortness of breath  CARDIOVASCULAR: No chest pain, no palpitations  GASTROINTESTINAL: No pain. No nausea or vomiting.  Reported chronic diarrhea, last this AM x 1.     NEUROLOGICAL: No headache or numbness, no tremors  MUSCULOSKELETAL: No joint pain, no muscle pain  GENITOURINARY: No dysuria, no frequency, no hesitancy  PSYCHIATRY: No depression , no anxiety  ALL OTHER  ROS negative      PHYSICAL EXAM:  GENERAL: NAD  HEENT: Normocephalic;  conjunctivae and sclerae clear; moist mucous membranes;   NECK : Supple  CHEST/LUNG: Clear to auscultation bilaterally with good air entry   HEART: S1 S2  regular; no murmurs, gallops or rubs  ABDOMEN: Obese, Soft, Nontender to palpation, Nondistended; Bowel sounds present x 4 quad.  No rebound or guarding noted.    EXTREMITIES: No cyanosis; no edema; no calf tenderness  SKIN: Warm and dry; St. 1 Coccyx, St. 1 B/l Heels.    NERVOUS SYSTEM:  Awake and alert; Oriented  to place & person, not time ; no new deficits      Vital Signs Last 24 Hrs  T(C): 36.5 (01 Sep 2023 12:26), Max: 37.2 (01 Sep 2023 00:30)  T(F): 97.7 (01 Sep 2023 12:26), Max: 99 (01 Sep 2023 00:30)  HR: 82 (01 Sep 2023 12:26) (78 - 90)  BP: 106/70 (01 Sep 2023 12:26) (93/62 - 109/50)  RR: 18 (01 Sep 2023 12:26) (18 - 19)  SpO2: 96% (01 Sep 2023 12:26) (95% - 98%)    Parameters below as of 01 Sep 2023 12:26  Patient On (Oxygen Delivery Method): room air  O2 Flow (L/min): 1      LABS:                        10.6   7.36  )-----------( 495      ( 01 Sep 2023 08:24 )             32.3     09-01    137  |  106  |  35<H>  ----------------------------<  102<H>  3.5   |  22  |  0.79    Ca    7.4<L>      01 Sep 2023 08:24  Phos  2.8     09-01  Mg     1.6     09-01    TPro  6.0  /  Alb  2.1<L>  /  TBili  0.4  /  DBili  x   /  AST  8<L>  /  ALT  23  /  AlkPhos  103  08-31      EKG:   IMAGING:    ASSESSMENT:    78 year old, Female, from [facility], with PMH of Transverse colon neoplasm s/p colectomy in [facility] in early August, HTN, HLD, CAD s/p LHC with TALI to RCA, Atrial fibrillation s/p watchman implant (3/23), hypothyroidism, PUJA (not on cpap), & TIA.  Patient reported feeling weak and tired, lightheaded more than usual and feels lethargic.  Patient reported her blood pressure was checked in the facility and was in the low 80s.   Admitted for Hypotension possibly 2/2 to poor oral intake vs. diarrhea vs. sepsis.          Problem/Plan - 1:  ·  Problem: Hypotension.   ·  Plan:   - P/w weakness, lethargy, & dizziness (more than usual).  Likely in setting of chronic diarrhea since colectomy vs. sepsis vs. poor oral intake.   - Reported SBP at facility in low 80s.  - S/p CXR negative    - S/p IVF in ED.  Currently on IVF.  Reassess in AM.  - Monitor orthostatic VS  - Bld & U. cx's pending-f/u results   - Rest below     Problem/Plan - 2:  ·  Problem: Diarrhea.   ·  Plan:   - P/w diarrhea since early August, s/p Sx-colectomy on imodium TID at NH.   - Continue to hold Imodium.  Monitor stool frequency without medication.   - S/p GI PCR negative   - O&P, stool culture pending-g/u results  - C. Diff not sent b/c no diarrhea on floor.  Last diarrhea in ED @ 3A on 9/1.   - Maintain isolation, if no diarrhea after 24h consider DC'ng isolation.      - Currently on liquid diet and IVF.  Reassess IVF in AM.    - MR A/P w/ PO contrast pending-f/u results   - ID-Dr Monreal consult pending-f/u recommendations     Problem/Plan - 3:  ·  Problem: Colon cancer.   ·  Plan:   H/o Colon cancer s/p colectomy.  Not on chemo drugs per NH paperwork.     Problem/Plan - 4:  ·  Problem: Hypertension.   ·  Plan:   H/o HTN on Losartan   - Continue to hold antihypertensive in setting of hypotenstion.       Problem/Plan - 5:  ·  Problem: Atrial fibrillation.   ·  Plan:  - C/w Eliquis. Not on B-blocker per NH paperwork.     Problem/Plan - 6:  ·  Problem: Hyperlipidemia.   ·  Plan:   - C/w Atorvastatin     Problem/Plan - 7:  ·  Problem: CAD (coronary artery disease).   ·  Plan:   - C/w ASA & Atorvastatin        Problem/Plan - 8:  ·  Problem: Hypothyroidism.   ·  Plan:   H/o Hypothyroidism on Levothyroxine  - C/w Levothyroxine     Problem/Plan - 9:  ·  Problem: Stage 1 Pressure Injury to the Coccyx.  ·  Plan:   - C/w Wound care   - Apply a Foam dressing to the Coccyx area Q 72hrs PRN     Problem/Plan - 10:  ·  Problem: Stage 1 Pressure Injury to Bilateral Heels.  ·  Plan:   - C/w Off loading   - Elevate/float the patients heels using heel protectors and reposition the patient Q 2hrs using wedges or pillows     Problem/Plan - 11:  ·  Problem: Prophylactic measure.   ·  Plan:   - C/w Eliquis for DVT ppx   - C/w Protonix for GI ppx EVENT:  Pt seen an examined     SUBJECTIVE: Pt denies HA, dizziness, SOB, CP or abdominal pain.  Reports to walk w/ a walker.  Reports chronic h/o diarrhea, "that's what I do."  NP discussed pt's allergies.  Denies allergy to Xanax, any other medications  or food.      OBJECTIVE  REVIEW OF SYSTEMS:    CONSTITUTIONAL: No fever  EYES: No acute visual disturbances  NECK: No pain or stiffness  RESPIRATORY: No cough; No shortness of breath  CARDIOVASCULAR: No chest pain, no palpitations  GASTROINTESTINAL: No pain. No nausea or vomiting.  Reported chronic diarrhea, last this AM x 1.     NEUROLOGICAL: No headache or numbness, no tremors  MUSCULOSKELETAL: No joint pain, no muscle pain  GENITOURINARY: No dysuria, no frequency, no hesitancy  PSYCHIATRY: No depression , no anxiety  ALL OTHER  ROS negative      PHYSICAL EXAM:  GENERAL: NAD  HEENT: Normocephalic;  conjunctivae and sclerae clear; moist mucous membranes;   NECK : Supple  CHEST/LUNG: Clear to auscultation bilaterally with good air entry   HEART: S1 S2  regular; no murmurs, gallops or rubs  ABDOMEN: Obese, Soft, Nontender to palpation, Nondistended; Bowel sounds present x 4 quad.  No rebound or guarding noted.    EXTREMITIES: No cyanosis; no edema; no calf tenderness  SKIN: Warm and dry; St. 1 Coccyx, St. 1 B/l Heels.    NERVOUS SYSTEM:  Awake and alert; Oriented  to place & person, not time ; no new deficits      Vital Signs Last 24 Hrs  T(C): 36.5 (01 Sep 2023 12:26), Max: 37.2 (01 Sep 2023 00:30)  T(F): 97.7 (01 Sep 2023 12:26), Max: 99 (01 Sep 2023 00:30)  HR: 82 (01 Sep 2023 12:26) (78 - 90)  BP: 106/70 (01 Sep 2023 12:26) (93/62 - 109/50)  RR: 18 (01 Sep 2023 12:26) (18 - 19)  SpO2: 96% (01 Sep 2023 12:26) (95% - 98%)    Parameters below as of 01 Sep 2023 12:26  Patient On (Oxygen Delivery Method): room air  O2 Flow (L/min): 1      LABS:                        10.6   7.36  )-----------( 495      ( 01 Sep 2023 08:24 )             32.3     09-01    137  |  106  |  35<H>  ----------------------------<  102<H>  3.5   |  22  |  0.79    Ca    7.4<L>      01 Sep 2023 08:24  Phos  2.8     09-01  Mg     1.6     09-01    TPro  6.0  /  Alb  2.1<L>  /  TBili  0.4  /  DBili  x   /  AST  8<L>  /  ALT  23  /  AlkPhos  103  08-31      EKG:   IMAGING:    ASSESSMENT:    78 year old, Female, from [facility], with PMH of Transverse colon neoplasm s/p colectomy in [facility] in early August, HTN, HLD, CAD s/p LHC with TALI to RCA, Atrial fibrillation s/p watchman implant (3/23), hypothyroidism, PUJA (not on cpap), & TIA.  Patient reported feeling weak and tired, lightheaded more than usual and feels lethargic.  Patient reported her blood pressure was checked in the facility and was in the low 80s.   Admitted for Hypotension possibly 2/2 to poor oral intake vs. diarrhea vs. sepsis.          Problem/Plan - 1:  ·  Problem: Hypotension.   ·  Plan:   - P/w weakness, lethargy, & dizziness (more than usual).  Likely in setting of chronic diarrhea since colectomy vs. sepsis vs. poor oral intake.   - Reported SBP at facility in low 80s.  - S/p CXR negative    - S/p IVF in ED.  Currently on IVF.  Reassess in AM.  - Monitor orthostatic VS  - Bld & U. cx's pending-f/u results   - Rest below     Problem/Plan - 2:  ·  Problem: Diarrhea.   ·  Plan:   - P/w diarrhea since early August, s/p Sx-colectomy on imodium TID at NH.   - Continue to hold Imodium.  Monitor stool frequency without medication.   - S/p GI PCR negative   - O&P, stool culture pending-g/u results  - C. Diff not sent b/c no diarrhea on floor.  Last diarrhea in ED @ 3A on 9/1.   - Maintain isolation, if no diarrhea after 24h consider DC'ng isolation.      - Currently on liquid diet and IVF.  Reassess IVF in AM.    - S/p MR as noted in chart note and below   - ID-Dr Monreal consult pending-f/u recommendations     Problem/Plan - 3:  ·  Problem: Colon cancer.   ·  Plan:   H/o Colon cancer s/p colectomy.  Not on chemo drugs per NH paperwork.     Problem/Plan - 4:  ·  Problem: Hypertension.   ·  Plan:   H/o HTN on Losartan   - Continue to hold antihypertensive in setting of hypotenstion.       Problem/Plan - 5:  ·  Problem: Atrial fibrillation.   ·  Plan:  - C/w Eliquis. Not on B-blocker per NH paperwork.     Problem/Plan - 6:  ·  Problem: Hyperlipidemia.   ·  Plan:   - C/w Atorvastatin     Problem/Plan - 7:  ·  Problem: CAD (coronary artery disease).   ·  Plan:   - C/w ASA & Atorvastatin        Problem/Plan - 8:  ·  Problem: Hypothyroidism.   ·  Plan:   H/o Hypothyroidism on Levothyroxine  - C/w Levothyroxine     Problem/Plan - 9:  ·  Problem: Stage 1 Pressure Injury to the Coccyx.  ·  Plan:   - C/w Wound care   - Apply a Foam dressing to the Coccyx area Q 72hrs PRN     Problem/Plan - 10:  ·  Problem: Stage 1 Pressure Injury to Bilateral Heels.  ·  Plan:   - C/w Off loading   - Elevate/float the patients heels using heel protectors and reposition the patient Q 2hrs using wedges or pillows     Problem/Plan - 11:  ·  Problem: Abnormal MR   ·  Plan:   - 9/1 MR read raised concern for necrotizing fasciitis   - Sx-Dr. Celeste consulted, appreciated- No planned sx intervention.  Recommended serial abdominal exams/ observation.  No role of abx tx.      Problem/Plan - 12:  ·  Problem: Prophylactic measure.   ·  Plan:   - C/w Eliquis for DVT ppx   - C/w Protonix for GI ppx EVENT:  Pt seen an examined     SUBJECTIVE: Pt denies HA, dizziness, SOB, CP or abdominal pain.  Reports to walk w/ a walker.  Reports chronic h/o diarrhea, "that's what I do."  NP discussed pt's allergies.  Denies allergy to Xanax, any other medications  or food.      OBJECTIVE  REVIEW OF SYSTEMS:    CONSTITUTIONAL: No fever  EYES: No acute visual disturbances  NECK: No pain or stiffness  RESPIRATORY: No cough; No shortness of breath  CARDIOVASCULAR: No chest pain, no palpitations  GASTROINTESTINAL: No pain. No nausea or vomiting.  Reported chronic diarrhea, last this AM x 1.     NEUROLOGICAL: No headache or numbness, no tremors  MUSCULOSKELETAL: No joint pain, no muscle pain  GENITOURINARY: No dysuria, no frequency, no hesitancy  PSYCHIATRY: No depression , no anxiety  ALL OTHER  ROS negative      PHYSICAL EXAM:  GENERAL: NAD  HEENT: Normocephalic;  conjunctivae and sclerae clear; moist mucous membranes;   NECK : Supple  CHEST/LUNG: Clear to auscultation bilaterally with good air entry   HEART: S1 S2  regular; no murmurs, gallops or rubs  ABDOMEN: Obese, Soft, Nontender to palpation, Nondistended; Bowel sounds present x 4 quad.  No rebound or guarding noted.    EXTREMITIES: No cyanosis; no edema; no calf tenderness  SKIN: Warm and dry; St. 1 Coccyx, St. 1 B/l Heels.    NERVOUS SYSTEM:  Awake and alert; Oriented  to place & person, not time ; no new deficits      Vital Signs Last 24 Hrs  T(C): 36.5 (01 Sep 2023 12:26), Max: 37.2 (01 Sep 2023 00:30)  T(F): 97.7 (01 Sep 2023 12:26), Max: 99 (01 Sep 2023 00:30)  HR: 82 (01 Sep 2023 12:26) (78 - 90)  BP: 106/70 (01 Sep 2023 12:26) (93/62 - 109/50)  RR: 18 (01 Sep 2023 12:26) (18 - 19)  SpO2: 96% (01 Sep 2023 12:26) (95% - 98%)    Parameters below as of 01 Sep 2023 12:26  Patient On (Oxygen Delivery Method): room air  O2 Flow (L/min): 1      LABS:                        10.6   7.36  )-----------( 495      ( 01 Sep 2023 08:24 )             32.3     09-01    137  |  106  |  35<H>  ----------------------------<  102<H>  3.5   |  22  |  0.79    Ca    7.4<L>      01 Sep 2023 08:24  Phos  2.8     09-01  Mg     1.6     09-01    TPro  6.0  /  Alb  2.1<L>  /  TBili  0.4  /  DBili  x   /  AST  8<L>  /  ALT  23  /  AlkPhos  103  08-31      EKG:   IMAGING:    ASSESSMENT:    78 year old, Female, from [facility], with PMH of Transverse colon neoplasm s/p colectomy in [facility] in early August, HTN, HLD, CAD s/p LHC with TALI to RCA, Atrial fibrillation s/p watchman implant (3/23), hypothyroidism, PUJA (not on cpap), & TIA.  Patient reported feeling weak and tired, lightheaded more than usual and feels lethargic.  Patient reported her blood pressure was checked in the facility and was in the low 80s.   Admitted for Hypotension possibly 2/2 to poor oral intake vs. diarrhea vs. sepsis.          Problem/Plan - 1:  ·  Problem: Hypotension.   ·  Plan:   - P/w weakness, lethargy, & dizziness (more than usual).  Likely in setting of chronic diarrhea since colectomy vs. sepsis vs. poor oral intake.   - Reported SBP at facility in low 80s.  - S/p CXR negative    - S/p IVF in ED.  Currently on IVF.  Reassess in AM.  - Monitor orthostatic VS  - Bld & U. cx's pending-f/u results   - Rest below     Problem/Plan - 2:  ·  Problem: Diarrhea.   ·  Plan:   - P/w diarrhea since early August, s/p Sx-colectomy on imodium TID at NH.   - Continue to hold Imodium.  Monitor stool frequency without medication.   - S/p GI PCR negative   - O&P, stool culture pending-g/u results  - C. Diff not sent b/c no diarrhea on floor.  Last diarrhea in ED @ 3A on 9/1.   - Maintain isolation, if no diarrhea after 24h consider DC'ng isolation.      - Currently on liquid diet and IVF.  Reassess IVF in AM.    - S/p MR as noted in chart note and below   - ID-Dr Monreal consult pending-f/u recommendations     Problem/Plan - 3:  ·  Problem: Colon cancer.   ·  Plan:   H/o Colon cancer s/p colectomy.  Not on chemo drugs per NH paperwork.     Problem/Plan - 4:  ·  Problem: Hypertension.   ·  Plan:   H/o HTN on Losartan   - Continue to hold antihypertensive in setting of hypotenstion.       Problem/Plan - 5:  ·  Problem: Atrial fibrillation.   ·  Plan:  - C/w Eliquis. Not on B-blocker per NH paperwork.     Problem/Plan - 6:  ·  Problem: Hyperlipidemia.   ·  Plan:   - C/w Atorvastatin     Problem/Plan - 7:  ·  Problem: CAD (coronary artery disease).   ·  Plan:   - C/w ASA & Atorvastatin        Problem/Plan - 8:  ·  Problem: Hypothyroidism.   ·  Plan:   H/o Hypothyroidism on Levothyroxine  - C/w Levothyroxine     Problem/Plan - 9:  ·  Problem: Stage 1 Pressure Injury to the Coccyx.  ·  Plan:   - C/w Wound care   - Apply a Foam dressing to the Coccyx area Q 72hrs PRN     Problem/Plan - 10:  ·  Problem: Stage 1 Pressure Injury to Bilateral Heels.  ·  Plan:   - C/w Off loading   - Elevate/float the patients heels using heel protectors and reposition the patient Q 2hrs using wedges or pillows     Problem/Plan - 11:  ·  Problem: Abnormal MR   ·  Plan:   - 9/1 MR read raised concern for necrotizing fasciitis   - Sx-Dr. Celeste consulted, appreciated- No planned sx intervention.  Recommended serial abdominal exams/ observation.  No role of abx tx.      Problem/Plan - 12:  ·  Problem: Prophylactic measure.   ·  Plan:   - C/w Eliquis for DVT ppx   - C/w Protonix for GI ppx EVENT:  Pt seen an examined     SUBJECTIVE: Pt denies HA, dizziness, SOB, CP or abdominal pain.  Reports to walk w/ a walker.  Reports chronic h/o diarrhea, "that's what I do."  NP discussed pt's allergies.  Denies allergy to Xanax, any other medications  or food.      OBJECTIVE  REVIEW OF SYSTEMS:    CONSTITUTIONAL: No fever  EYES: No acute visual disturbances  NECK: No pain or stiffness  RESPIRATORY: No cough; No shortness of breath  CARDIOVASCULAR: No chest pain, no palpitations  GASTROINTESTINAL: No pain. No nausea or vomiting.  Reported chronic diarrhea, last this AM x 1.     NEUROLOGICAL: No headache or numbness, no tremors  MUSCULOSKELETAL: No joint pain, no muscle pain  GENITOURINARY: No dysuria, no frequency, no hesitancy  PSYCHIATRY: No depression , no anxiety  ALL OTHER  ROS negative      PHYSICAL EXAM:  GENERAL: NAD  HEENT: Normocephalic;  conjunctivae and sclerae clear; moist mucous membranes;   NECK : Supple  CHEST/LUNG: Clear to auscultation bilaterally with good air entry   HEART: S1 S2  regular; no murmurs, gallops or rubs  ABDOMEN: Obese, Soft, Nontender to palpation, Nondistended; Bowel sounds present x 4 quad.  No rebound or guarding noted.    EXTREMITIES: No cyanosis; no edema; no calf tenderness  SKIN: Warm and dry; St. 1 Coccyx, St. 1 B/l Heels.    NERVOUS SYSTEM:  Awake and alert; Oriented  to place & person, not time ; no new deficits      Vital Signs Last 24 Hrs  T(C): 36.5 (01 Sep 2023 12:26), Max: 37.2 (01 Sep 2023 00:30)  T(F): 97.7 (01 Sep 2023 12:26), Max: 99 (01 Sep 2023 00:30)  HR: 82 (01 Sep 2023 12:26) (78 - 90)  BP: 106/70 (01 Sep 2023 12:26) (93/62 - 109/50)  RR: 18 (01 Sep 2023 12:26) (18 - 19)  SpO2: 96% (01 Sep 2023 12:26) (95% - 98%)    Parameters below as of 01 Sep 2023 12:26  Patient On (Oxygen Delivery Method): room air  O2 Flow (L/min): 1      LABS:                        10.6   7.36  )-----------( 495      ( 01 Sep 2023 08:24 )             32.3     09-01    137  |  106  |  35<H>  ----------------------------<  102<H>  3.5   |  22  |  0.79    Ca    7.4<L>      01 Sep 2023 08:24  Phos  2.8     09-01  Mg     1.6     09-01    TPro  6.0  /  Alb  2.1<L>  /  TBili  0.4  /  DBili  x   /  AST  8<L>  /  ALT  23  /  AlkPhos  103  08-31      EKG:   IMAGING:    ASSESSMENT:    78 year old, Female, from [facility], with PMH of Transverse colon neoplasm s/p colectomy in [facility] in early August, HTN, HLD, CAD s/p LHC with TALI to RCA, Atrial fibrillation s/p watchman implant (3/23), hypothyroidism, PUJA (not on cpap), & TIA.  Patient reported feeling weak and tired, lightheaded more than usual and feels lethargic.  Patient reported her blood pressure was checked in the facility and was in the low 80s.   Admitted for Hypotension possibly 2/2 to poor oral intake vs. diarrhea vs. sepsis.          Problem/Plan - 1:  ·  Problem: Hypotension.   ·  Plan:   - P/w weakness, lethargy, & dizziness (more than usual).  Likely in setting of chronic diarrhea since colectomy vs. sepsis vs. poor oral intake.   - Reported SBP at facility in low 80s.  - S/p CXR negative    - S/p IVF in ED.  Currently on IVF.  Reassess in AM.  - Monitor orthostatic VS  - Bld & U. cx's pending-f/u results   - Rest below     Problem/Plan - 2:  ·  Problem: Diarrhea.   ·  Plan:   - P/w diarrhea since early August, s/p Sx-colectomy on imodium TID at NH.   - Continue to hold Imodium.  Monitor stool frequency without medication.   - S/p GI PCR negative   - O&P, stool culture pending-g/u results  - C. Diff not sent b/c no diarrhea on floor.  Last diarrhea in ED @ 3A on 9/1.   - Maintain isolation, if no diarrhea after 24h consider DC'ng isolation.      - Currently on liquid diet and IVF.  Reassess IVF in AM.    - S/p MR as noted in chart note and below   - ID-Dr Monreal consult pending-f/u recommendations     Problem/Plan - 3:  ·  Problem: Colon cancer.   ·  Plan:   H/o Colon cancer s/p colectomy.  Not on chemo drugs per NH paperwork.     Problem/Plan - 4:  ·  Problem: Hypertension.   ·  Plan:   H/o HTN on Losartan   - Continue to hold antihypertensive in setting of hypotenstion.       Problem/Plan - 5:  ·  Problem: Atrial fibrillation.   ·  Plan:  - C/w Eliquis. Not on B-blocker per NH paperwork.     Problem/Plan - 6:  ·  Problem: Hyperlipidemia.   ·  Plan:   - C/w Atorvastatin     Problem/Plan - 7:  ·  Problem: CAD (coronary artery disease).   ·  Plan:   - C/w ASA & Atorvastatin        Problem/Plan - 8:  ·  Problem: Hypothyroidism.   ·  Plan:   H/o Hypothyroidism on Levothyroxine  - C/w Levothyroxine     Problem/Plan - 9:  ·  Problem: Stage 1 Pressure Injury to the Coccyx.  ·  Plan:   - C/w Wound care   - Apply a Foam dressing to the Coccyx area Q 72hrs PRN     Problem/Plan - 10:  ·  Problem: Stage 1 Pressure Injury to Bilateral Heels.  ·  Plan:   - C/w Off loading   - Elevate/float the patients heels using heel protectors and reposition the patient Q 2hrs using wedges or pillows     Problem/Plan - 11:  ·  Problem: Abnormal MR   ·  Plan:   - 9/1 MR read raised concern for necrotizing fasciitis   - Sx-Dr. Celeste consulted, appreciated- No planned sx intervention.  Recommended serial abdominal exams/ observation.  No role of abx tx.      Problem/Plan - 12:  ·  Problem: Prophylactic measure.   ·  Plan:   - C/w Eliquis for DVT ppx   - C/w Protonix for GI ppx EVENT:  Pt seen an examined     SUBJECTIVE: Pt denies HA, dizziness, SOB, CP or abdominal pain.  Reports to walk w/ a walker.  Reports chronic h/o diarrhea, "that's what I do."  NP discussed pt's allergies.  Denies allergy to Xanax, any other medications  or food.      OBJECTIVE  REVIEW OF SYSTEMS:    CONSTITUTIONAL: No fever  EYES: No acute visual disturbances  NECK: No pain or stiffness  RESPIRATORY: No cough; No shortness of breath  CARDIOVASCULAR: No chest pain, no palpitations  GASTROINTESTINAL: No pain. No nausea or vomiting.  Reported chronic diarrhea, last this AM x 1.     NEUROLOGICAL: No headache or numbness, no tremors  MUSCULOSKELETAL: No joint pain, no muscle pain  GENITOURINARY: No dysuria, no frequency, no hesitancy  PSYCHIATRY: No depression , no anxiety  ALL OTHER  ROS negative      PHYSICAL EXAM:  GENERAL: NAD  HEENT: Normocephalic;  conjunctivae and sclerae clear; moist mucous membranes;   NECK : Supple  CHEST/LUNG: Clear to auscultation bilaterally with good air entry   HEART: S1 S2  regular; no murmurs, gallops or rubs  ABDOMEN: Obese, Soft, Nontender to palpation, Nondistended; Bowel sounds present x 4 quad.  No rebound or guarding noted.    EXTREMITIES: No cyanosis; no edema; no calf tenderness  SKIN: Warm and dry; St. 1 Coccyx, St. 1 B/l Heels.    NERVOUS SYSTEM:  Awake and alert; Oriented  to place & person, not time ; no new deficits      Vital Signs Last 24 Hrs  T(C): 36.5 (01 Sep 2023 12:26), Max: 37.2 (01 Sep 2023 00:30)  T(F): 97.7 (01 Sep 2023 12:26), Max: 99 (01 Sep 2023 00:30)  HR: 82 (01 Sep 2023 12:26) (78 - 90)  BP: 106/70 (01 Sep 2023 12:26) (93/62 - 109/50)  RR: 18 (01 Sep 2023 12:26) (18 - 19)  SpO2: 96% (01 Sep 2023 12:26) (95% - 98%)    Parameters below as of 01 Sep 2023 12:26  Patient On (Oxygen Delivery Method): room air  O2 Flow (L/min): 1      LABS:                        10.6   7.36  )-----------( 495      ( 01 Sep 2023 08:24 )             32.3     09-01    137  |  106  |  35<H>  ----------------------------<  102<H>  3.5   |  22  |  0.79    Ca    7.4<L>      01 Sep 2023 08:24  Phos  2.8     09-01  Mg     1.6     09-01    TPro  6.0  /  Alb  2.1<L>  /  TBili  0.4  /  DBili  x   /  AST  8<L>  /  ALT  23  /  AlkPhos  103  08-31      EKG:   IMAGING:    ASSESSMENT:    78 year old, Female, from [facility], with PMH of Transverse colon neoplasm s/p colectomy in [facility] in early August, HTN, HLD, CAD s/p LHC with TALI to RCA, Atrial fibrillation s/p watchman implant (3/23), hypothyroidism, PUJA (not on cpap), & TIA.  Patient reported feeling weak and tired, lightheaded more than usual and feels lethargic.  Patient reported her blood pressure was checked in the facility and was in the low 80s.   Admitted for Hypotension possibly 2/2 to poor oral intake vs. diarrhea vs. sepsis.          Problem/Plan - 1:  ·  Problem: Hypotension.   ·  Plan:   - P/w weakness, lethargy, & dizziness (more than usual).  Likely in setting of chronic diarrhea since colectomy vs. sepsis vs. poor oral intake.   - Reported SBP at facility in low 80s.  - S/p CXR negative    - S/p IVF in ED.  Currently on IVF.  Reassess in AM.  - Monitor orthostatic VS  - Bld & U. cx's pending-f/u results   - Rest below     Problem/Plan - 2:  ·  Problem: Diarrhea.   ·  Plan:   - P/w diarrhea since early August, s/p Sx-colectomy on imodium TID at NH.   - Continue to hold Imodium.  Monitor stool frequency without medication.   - S/p GI PCR negative   - O&P, stool culture pending-g/u results  - C. Diff not sent b/c no diarrhea on floor.  Last diarrhea in ED @ 3A on 9/1.   - Maintain isolation, if no diarrhea after 24h consider DC'ng isolation.      - Currently on liquid diet and IVF.  Reassess IVF in AM.    - S/p CT as noted in chart note and below   - ID-Dr Monreal consult pending-f/u recommendations     Problem/Plan - 3:  ·  Problem: Colon cancer.   ·  Plan:   H/o Colon cancer s/p colectomy.  Not on chemo drugs per NH paperwork.     Problem/Plan - 4:  ·  Problem: Hypertension.   ·  Plan:   H/o HTN on Losartan   - Continue to hold antihypertensive in setting of hypotenstion.       Problem/Plan - 5:  ·  Problem: Atrial fibrillation.   ·  Plan:  - C/w Eliquis. Not on B-blocker per NH paperwork.     Problem/Plan - 6:  ·  Problem: Hyperlipidemia.   ·  Plan:   - C/w Atorvastatin     Problem/Plan - 7:  ·  Problem: CAD (coronary artery disease).   ·  Plan:   - C/w ASA & Atorvastatin        Problem/Plan - 8:  ·  Problem: Hypothyroidism.   ·  Plan:   H/o Hypothyroidism on Levothyroxine  - C/w Levothyroxine     Problem/Plan - 9:  ·  Problem: Stage 1 Pressure Injury to the Coccyx.  ·  Plan:   - C/w Wound care   - Apply a Foam dressing to the Coccyx area Q 72hrs PRN     Problem/Plan - 10:  ·  Problem: Stage 1 Pressure Injury to Bilateral Heels.  ·  Plan:   - C/w Off loading   - Elevate/float the patients heels using heel protectors and reposition the patient Q 2hrs using wedges or pillows     Problem/Plan - 11:  ·  Problem: Abnormal MR   ·  Plan:   - 9/1 CT read raised concern for necrotizing fasciitis   - Sx-Dr. Celeste consulted, appreciated- No planned sx intervention.  Recommended serial abdominal exams/ observation.  No role of abx tx.      Problem/Plan - 12:  ·  Problem: Prophylactic measure.   ·  Plan:   - C/w Eliquis for DVT ppx   - C/w Protonix for GI ppx EVENT:  Pt seen an examined     SUBJECTIVE: Pt denies HA, dizziness, SOB, CP or abdominal pain.  Reports to walk w/ a walker.  Reports chronic h/o diarrhea, "that's what I do."  NP discussed pt's allergies.  Denies allergy to Xanax, any other medications  or food.      OBJECTIVE  REVIEW OF SYSTEMS:    CONSTITUTIONAL: No fever  EYES: No acute visual disturbances  NECK: No pain or stiffness  RESPIRATORY: No cough; No shortness of breath  CARDIOVASCULAR: No chest pain, no palpitations  GASTROINTESTINAL: No pain. No nausea or vomiting.  Reported chronic diarrhea, last this AM x 1.     NEUROLOGICAL: No headache or numbness, no tremors  MUSCULOSKELETAL: No joint pain, no muscle pain  GENITOURINARY: No dysuria, no frequency, no hesitancy  PSYCHIATRY: No depression , no anxiety  ALL OTHER  ROS negative      PHYSICAL EXAM:  GENERAL: NAD  HEENT: Normocephalic;  conjunctivae and sclerae clear; moist mucous membranes;   NECK : Supple  CHEST/LUNG: Clear to auscultation bilaterally with good air entry   HEART: S1 S2  regular; no murmurs, gallops or rubs  ABDOMEN: Obese, Soft, Nontender to palpation, Nondistended; Bowel sounds present x 4 quad.  No rebound or guarding noted.    EXTREMITIES: No cyanosis; no edema; no calf tenderness  SKIN: Warm and dry; St. 1 Coccyx, St. 1 B/l Heels.    NERVOUS SYSTEM:  Awake and alert; Oriented  to place & person, not time ; no new deficits      Vital Signs Last 24 Hrs  T(C): 36.5 (01 Sep 2023 12:26), Max: 37.2 (01 Sep 2023 00:30)  T(F): 97.7 (01 Sep 2023 12:26), Max: 99 (01 Sep 2023 00:30)  HR: 82 (01 Sep 2023 12:26) (78 - 90)  BP: 106/70 (01 Sep 2023 12:26) (93/62 - 109/50)  RR: 18 (01 Sep 2023 12:26) (18 - 19)  SpO2: 96% (01 Sep 2023 12:26) (95% - 98%)    Parameters below as of 01 Sep 2023 12:26  Patient On (Oxygen Delivery Method): room air  O2 Flow (L/min): 1      LABS:                        10.6   7.36  )-----------( 495      ( 01 Sep 2023 08:24 )             32.3     09-01    137  |  106  |  35<H>  ----------------------------<  102<H>  3.5   |  22  |  0.79    Ca    7.4<L>      01 Sep 2023 08:24  Phos  2.8     09-01  Mg     1.6     09-01    TPro  6.0  /  Alb  2.1<L>  /  TBili  0.4  /  DBili  x   /  AST  8<L>  /  ALT  23  /  AlkPhos  103  08-31      EKG:   IMAGING:    ASSESSMENT:    78 year old, Female, from [facility], with PMH of Transverse colon neoplasm s/p colectomy in [facility] in early August, HTN, HLD, CAD s/p LHC with TALI to RCA, Atrial fibrillation s/p watchman implant (3/23), hypothyroidism, PUJA (not on cpap), & TIA.  Patient reported feeling weak and tired, lightheaded more than usual and feels lethargic.  Patient reported her blood pressure was checked in the facility and was in the low 80s.   Admitted for Hypotension possibly 2/2 to poor oral intake vs. diarrhea vs. sepsis.          Problem/Plan - 1:  ·  Problem: Hypotension.   ·  Plan:   - P/w weakness, lethargy, & dizziness (more than usual).  Likely in setting of chronic diarrhea since colectomy vs. sepsis vs. poor oral intake.   - Reported SBP at facility in low 80s.  - S/p CXR negative    - S/p IVF in ED.  Currently on IVF.  Reassess in AM.  - Monitor orthostatic VS  - Bld & U. cx's pending-f/u results   - Rest below     Problem/Plan - 2:  ·  Problem: Diarrhea.   ·  Plan:   - P/w diarrhea since early August, s/p Sx-colectomy on imodium TID at NH.   - Continue to hold Imodium.  Monitor stool frequency without medication.   - S/p GI PCR negative   - O&P, stool culture pending-g/u results  - C. Diff not sent b/c no diarrhea on floor.  Last diarrhea in ED @ 3A on 9/1.   - Maintain isolation, if no diarrhea after 24h consider DC'ng isolation.      - Currently on liquid diet and IVF.  Reassess IVF in AM.    - S/p CT as noted in chart note and below   - ID-Dr Monreal consult pending-f/u recommendations     Problem/Plan - 3:  ·  Problem: Colon cancer.   ·  Plan:   H/o Colon cancer s/p colectomy.  Not on chemo drugs per NH paperwork.     Problem/Plan - 4:  ·  Problem: Hypertension.   ·  Plan:   H/o HTN on Losartan   - Continue to hold antihypertensive in setting of hypotenstion.       Problem/Plan - 5:  ·  Problem: Atrial fibrillation.   ·  Plan:  - C/w Eliquis. Not on B-blocker per NH paperwork.     Problem/Plan - 6:  ·  Problem: Hyperlipidemia.   ·  Plan:   - C/w Atorvastatin     Problem/Plan - 7:  ·  Problem: CAD (coronary artery disease).   ·  Plan:   - C/w ASA & Atorvastatin        Problem/Plan - 8:  ·  Problem: Hypothyroidism.   ·  Plan:   H/o Hypothyroidism on Levothyroxine  - C/w Levothyroxine     Problem/Plan - 9:  ·  Problem: Stage 1 Pressure Injury to the Coccyx.  ·  Plan:   - C/w Wound care   - Apply a Foam dressing to the Coccyx area Q 72hrs PRN     Problem/Plan - 10:  ·  Problem: Stage 1 Pressure Injury to Bilateral Heels.  ·  Plan:   - C/w Off loading   - Elevate/float the patients heels using heel protectors and reposition the patient Q 2hrs using wedges or pillows     Problem/Plan - 11:  ·  Problem: Abnormal MR   ·  Plan:   - 9/1 CT read raised concern for necrotizing fasciitis   - Sx-Dr. Celeste consulted, appreciated- No planned sx intervention.  Recommended serial abdominal exams/ observation.  No role of abx tx.      Problem/Plan - 12:  ·  Problem: Prophylactic measure.   ·  Plan:   - C/w Eliquis for DVT ppx   - C/w Protonix for GI ppx EVENT:  Pt seen an examined     SUBJECTIVE: Pt denies HA, dizziness, SOB, CP or abdominal pain.  Reports to walk w/ a walker.  Reports chronic h/o diarrhea, "that's what I do."  NP discussed pt's allergies.  Denies allergy to Xanax, any other medications  or food.      OBJECTIVE  REVIEW OF SYSTEMS:    CONSTITUTIONAL: No fever  EYES: No acute visual disturbances  NECK: No pain or stiffness  RESPIRATORY: No cough; No shortness of breath  CARDIOVASCULAR: No chest pain, no palpitations  GASTROINTESTINAL: No pain. No nausea or vomiting.  Reported chronic diarrhea, last this AM x 1.     NEUROLOGICAL: No headache or numbness, no tremors  MUSCULOSKELETAL: No joint pain, no muscle pain  GENITOURINARY: No dysuria, no frequency, no hesitancy  PSYCHIATRY: No depression , no anxiety  ALL OTHER  ROS negative      PHYSICAL EXAM:  GENERAL: NAD  HEENT: Normocephalic;  conjunctivae and sclerae clear; moist mucous membranes;   NECK : Supple  CHEST/LUNG: Clear to auscultation bilaterally with good air entry   HEART: S1 S2  regular; no murmurs, gallops or rubs  ABDOMEN: Obese, Soft, Nontender to palpation, Nondistended; Bowel sounds present x 4 quad.  No rebound or guarding noted.    EXTREMITIES: No cyanosis; no edema; no calf tenderness  SKIN: Warm and dry; St. 1 Coccyx, St. 1 B/l Heels.    NERVOUS SYSTEM:  Awake and alert; Oriented  to place & person, not time ; no new deficits      Vital Signs Last 24 Hrs  T(C): 36.5 (01 Sep 2023 12:26), Max: 37.2 (01 Sep 2023 00:30)  T(F): 97.7 (01 Sep 2023 12:26), Max: 99 (01 Sep 2023 00:30)  HR: 82 (01 Sep 2023 12:26) (78 - 90)  BP: 106/70 (01 Sep 2023 12:26) (93/62 - 109/50)  RR: 18 (01 Sep 2023 12:26) (18 - 19)  SpO2: 96% (01 Sep 2023 12:26) (95% - 98%)    Parameters below as of 01 Sep 2023 12:26  Patient On (Oxygen Delivery Method): room air  O2 Flow (L/min): 1      LABS:                        10.6   7.36  )-----------( 495      ( 01 Sep 2023 08:24 )             32.3     09-01    137  |  106  |  35<H>  ----------------------------<  102<H>  3.5   |  22  |  0.79    Ca    7.4<L>      01 Sep 2023 08:24  Phos  2.8     09-01  Mg     1.6     09-01    TPro  6.0  /  Alb  2.1<L>  /  TBili  0.4  /  DBili  x   /  AST  8<L>  /  ALT  23  /  AlkPhos  103  08-31      EKG:   IMAGING:    ASSESSMENT:    78 year old, Female, from [facility], with PMH of Transverse colon neoplasm s/p colectomy in [facility] in early August, HTN, HLD, CAD s/p LHC with TALI to RCA, Atrial fibrillation s/p watchman implant (3/23), hypothyroidism, PUJA (not on cpap), & TIA.  Patient reported feeling weak and tired, lightheaded more than usual and feels lethargic.  Patient reported her blood pressure was checked in the facility and was in the low 80s.   Admitted for Hypotension possibly 2/2 to poor oral intake vs. diarrhea vs. sepsis.          Problem/Plan - 1:  ·  Problem: Hypotension.   ·  Plan:   - P/w weakness, lethargy, & dizziness (more than usual).  Likely in setting of chronic diarrhea since colectomy vs. sepsis vs. poor oral intake.   - Reported SBP at facility in low 80s.  - S/p CXR negative    - S/p IVF in ED.  Currently on IVF.  Reassess in AM.  - Monitor orthostatic VS  - Bld & U. cx's pending-f/u results   - Rest below     Problem/Plan - 2:  ·  Problem: Diarrhea.   ·  Plan:   - P/w diarrhea since early August, s/p Sx-colectomy on imodium TID at NH.   - Continue to hold Imodium.  Monitor stool frequency without medication.   - S/p GI PCR negative   - O&P, stool culture pending-g/u results  - C. Diff not sent b/c no diarrhea on floor.  Last diarrhea in ED @ 3A on 9/1.   - Maintain isolation, if no diarrhea after 24h consider DC'ng isolation.      - Currently on liquid diet and IVF.  Reassess IVF in AM.    - S/p CT as noted in chart note and below   - ID-Dr Monreal consult pending-f/u recommendations     Problem/Plan - 3:  ·  Problem: Colon cancer.   ·  Plan:   H/o Colon cancer s/p colectomy.  Not on chemo drugs per NH paperwork.     Problem/Plan - 4:  ·  Problem: Hypertension.   ·  Plan:   H/o HTN on Losartan   - Continue to hold antihypertensive in setting of hypotenstion.       Problem/Plan - 5:  ·  Problem: Atrial fibrillation.   ·  Plan:  - C/w Eliquis. Not on B-blocker per NH paperwork.     Problem/Plan - 6:  ·  Problem: Hyperlipidemia.   ·  Plan:   - C/w Atorvastatin     Problem/Plan - 7:  ·  Problem: CAD (coronary artery disease).   ·  Plan:   - C/w ASA & Atorvastatin        Problem/Plan - 8:  ·  Problem: Hypothyroidism.   ·  Plan:   H/o Hypothyroidism on Levothyroxine  - C/w Levothyroxine     Problem/Plan - 9:  ·  Problem: Stage 1 Pressure Injury to the Coccyx.  ·  Plan:   - C/w Wound care   - Apply a Foam dressing to the Coccyx area Q 72hrs PRN     Problem/Plan - 10:  ·  Problem: Stage 1 Pressure Injury to Bilateral Heels.  ·  Plan:   - C/w Off loading   - Elevate/float the patients heels using heel protectors and reposition the patient Q 2hrs using wedges or pillows     Problem/Plan - 11:  ·  Problem: Abnormal MR   ·  Plan:   - 9/1 CT read raised concern for necrotizing fasciitis   - Sx-Dr. Celeste consulted, appreciated- No planned sx intervention.  Recommended serial abdominal exams/ observation.  No role of abx tx.      Problem/Plan - 12:  ·  Problem: Prophylactic measure.   ·  Plan:   - C/w Eliquis for DVT ppx   - C/w Protonix for GI ppx

## 2023-09-01 NOTE — H&P ADULT - PROBLEM SELECTOR PLAN 1
P/w weakness, lethargy, dizziness.   SBP at facility in low 80s.   s/p 2.5L NS in ED.   S/p 1L NS.  Likely in setting of chronic diarrhea since colectomy VS sepsis vs poor oral intake.   C/w IV fluids.   F/u orthostatic vitals.   F/U Bcx.   F/U UA  F/U Ucx   CXR - negative.

## 2023-09-01 NOTE — H&P ADULT - ATTENDING COMMENTS
79 y/o F with PMH transverse colon neoplasm s/p colectomy in [facility] 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 Patient reports that since yesterday she feels weak and tired,chronic diarrhea,hypotension.  1.Hypotension-IVF.  2.Chronic diarrhea-CT abd and pelvis,GI eval.Stool cx.  3.Hypothyroid-synthroid.  4.Echocardiogram.  5.CAD-asa,b blocker,statin.  6.Afib-eliquis.  7.GI prophylaxis. 77 y/o F with PMH transverse colon neoplasm s/p colectomy in [facility] 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 Patient reports that since yesterday she feels weak and tired,chronic diarrhea,hypotension.  1.Hypotension-IVF.  2.Chronic diarrhea-CT abd and pelvis,GI eval.Stool cx.  3.Hypothyroid-synthroid.  4.Echocardiogram.  5.CAD-asa,b blocker,statin.  6.Afib-eliquis.  7.GI prophylaxis. 79 y/o F with PMH transverse colon neoplasm s/p colectomy in [facility] 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 Patient reports that since yesterday she feels weak and tired,chronic diarrhea,hypotension.  1.Hypotension-IVF.  2.Chronic diarrhea-CT abd and pelvis,GI eval.Stool cx.  3.Hypothyroid-synthroid.  4.Echocardiogram.  5.CAD-asa,b blocker,statin.  6.Afib-eliquis.  7.GI prophylaxis.

## 2023-09-01 NOTE — CONSULT NOTE ADULT - GENITOURINARY
Subjective:  Patient is a 63 year old female with GERD here for f/u.    I last saw her in the clinic 6/29/16.  She was c/o gassiness and bloating, frequent eructation and borborygmi despite omperazole 40 mg po q day. She was switched to protonix and was told to f/u or call if unimproved but did not do so.    Last EGD was 7/23/12 which showed a 2 cm segment of possible Heath's but biopsies did not show any specialized intestinal metaplasia.        I last saw her on 7/24/17 when she had a 5 year f/u colonoscopy (mother had colon cancer) which showed moderate left sided diverticulosis and grade 2 internal hemorrhoids.  5 year f/u was recommended. At that time she had no upper GI issues so EGD was not done.    Again she is c/o of gassiness, bloating, frequent eructation and borborygmi. She is on Pantoprazole 40 mg po q day. She still has a lot of heartburn and acid regurgiation. She denies dysuria, she denies severe flank pains.  She has had kidney stones but hese are much milder symptoms.   She drinks only 1 cup of coffee per day, does not drink soda or juic.    She is s/p cholecystecotmy, appendectomy, BTL and multiple caesarians    Objective:    Obese BF  Abdomen is soft, there is some RUQ and mild epigstic tenderness, no rebound/guarding, no hepatopsplenomegaly/masses, bowel sounds normal.    Cbc. lfts 6/2017 wnl    Assessment:   ?IBS, hx of Heath's on prior scopes (but non on last one 2012)    Plan:  EGD. The risks, benefits, and alternatives to the procedure were explained and understood, the patient has agreed to proceed.  IV sedation.    E copy to Dr Analilia Milligan, thanks.    
details…
negative

## 2023-09-01 NOTE — H&P ADULT - NSICDXPASTMEDICALHX_GEN_ALL_CORE_FT
PAST MEDICAL HISTORY:  Atrial fibrillation     CAD (coronary artery disease)     Colon cancer     Hyperlipidemia     Hypertension     Hypothyroidism     Obstructive sleep apnea     Transient ischemic attack (TIA)

## 2023-09-01 NOTE — CONSULT NOTE ADULT - NEGATIVE GASTROINTESTINAL SYMPTOMS
no nausea/no vomiting/no abdominal pain
no nausea/no vomiting/no constipation/no melena/no hematochezia/no steatorrhea/no jaundice/no hiccoughs

## 2023-09-01 NOTE — CONSULT NOTE ADULT - GASTROINTESTINAL
soft/nontender/nondistended/normal active bowel sounds/no guarding/no rigidity/no palpable peg details…

## 2023-09-01 NOTE — PATIENT PROFILE ADULT - NURSING HOMES
[facility]

## 2023-09-01 NOTE — CONSULT NOTE ADULT - NS ATTEND AMEND GEN_ALL_CORE FT
Pt w/ recent subtotal colectomy (sounds like it is due to some form of polyposis syndrome) at an OSH   Presenting w/ diarrhea and found to have hypotension and on CT extensive subq emphysema and enteritis    abd soft, obese, non-distended, non-tender to palpation  well healing trocar sites w/o erythema or TTP or drainage    - pt w/ robotic surgery a few weeks ago which is the likely cause of the subq emphysema, very low suspicion for nec fasc at this time  - no need for abx for nec fasc at this time  - f/u C diff and GI PCR    Jose De Jesus Celeste MD  Attending Physician

## 2023-09-01 NOTE — PATIENT PROFILE ADULT - FUNCTIONAL ASSESSMENT - DAILY ACTIVITY 5.
He doesn't need the vitamin c and d anymore  It is for 30 days with covid infection   He can take otc if he likes 2 = A lot of assistance

## 2023-09-01 NOTE — CONSULT NOTE ADULT - GASTROINTESTINAL COMMENTS
palpable crepitus in RUQ, mid abdomen incisions well healed, no active drainage, no erythema, Pfannenstiel incision well healed, no active drainage, no erythema

## 2023-09-01 NOTE — H&P ADULT - NSHPPHYSICALEXAM_GEN_ALL_CORE
PHYSICAL EXAM:  GENERAL: NAD, speaks in full sentences, no signs of respiratory distress, obese  HEAD:  Atraumatic, Normocephalic  EYES: EOMI, PERRLA, conjunctiva and sclera clear  NECK: Supple  CHEST/LUNG: Clear to auscultation bilaterally; No wheeze; No crackles; No accessory muscles used  HEART: Regular rate and rhythm; No murmurs;   ABDOMEN: Soft, Nontender, Nondistended; Bowel sounds present; No guarding  EXTREMITIES:  2+ Peripheral Pulses, No edema  PSYCH: AAOx3  NEUROLOGY: non-focal  SKIN: No rashes or lesions

## 2023-09-01 NOTE — CONSULT NOTE ADULT - RESPIRATORY
respirations non-labored
clear to auscultation bilaterally/no wheezes/no rales/no rhonchi/no respiratory distress/no use of accessory muscles/no subcutaneous emphysema/airway patent/breath sounds equal/good air movement/respirations non-labored

## 2023-09-01 NOTE — CONSULT NOTE ADULT - NEUROLOGICAL
details… sensation intact/responds to pain/responds to verbal commands/cranial nerves intact/no spontaneous movement

## 2023-09-01 NOTE — CONSULT NOTE ADULT - ASSESSMENT
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 Patient reports that since her surgery earlier in August she is not eating much at all and  has had diarrhea. She used to have 5-6 episodes of loose watery bowel movements every day and now have improved to 2-3 bowel movements daily. She has no fever or chills. ON Admission, she found to have no fever but Low Blood pressure, BP of 93/62. The stool studies are in process. The ID consult requested to assist with further evaluation and antibiotic management.     # HYpotension  # R/O Infectious etiology of diarrhea    would recommend:    1. Please obtain CT scan of abd/pelvis to r/o pathology  2. Stool fro C.difficile PCR is pending collection  3. Monitor BP and IVF boluses as needed  4. Follow up Blood cultures    will follow the patient with you and make further recommendation based on the clinical course and Lab results  Thank you for the opportunity to participate in Ms. JIMENEZ's care    Attending Attestation:    Spent more than 65 minutes on total encounter, more than 50 % of the visit was spent counseling and/or coordinating care by the Attending physician.       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 Patient reports that since her surgery earlier in August she is not eating much at all and  has had diarrhea. She used to have 5-6 episodes of loose watery bowel movements every day and now have improved to 2-3 bowel movements daily. She has no fever or chills. ON Admission, she found to have no fever but Low Blood pressure, BP of 93/62. The stool studies are in process. The ID consult requested to assist with further evaluation and antibiotic management.     # HYpotension  # R/O Infectious etiology of diarrhea    would recommend:    1. Please obtain CT scan of abd/pelvis to r/o pathology  2. Stool fro C.difficile PCR is pending collection  3. Monitor BP and IVF boluses as needed  4. Follow up Blood cultures    will follow the patient with you and make further recommendation based on the clinical course and Lab results  Thank you for the opportunity to participate in Ms. JIMENEZ's care    Attending Attestation:    Spent more than 65 minutes on total encounter, more than 50 % of the visit was spent counseling and/or coordinating care by the Attending physician.       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 Patient reports that since her surgery earlier in August she is not eating much at all and  has had diarrhea. She used to have 5-6 episodes of loose watery bowel movements every day and now have improved to 2-3 bowel movements daily. She has no fever or chills. ON Admission, she found to have no fever but Low Blood pressure, BP of 93/62. The stool studies are in process. The ID consult requested to assist with further evaluation and antibiotic management.     # HYpotension  # R/O Infectious etiology of diarrhea    would recommend:    1. Please obtain CT scan of abd/pelvis to r/o pathology  2. Stool fro C.difficile PCR is pending collection  3. Monitor BP and IVF boluses as needed  4. Follow up Blood cultures    will follow the patient with you and make further recommendation based on the clinical course and Lab results  Thank you for the opportunity to participate in Ms. JIMENEZ's care    Attending Attestation:    Spent more than 65 minutes on total encounter, more than 50 % of the visit was spent counseling and/or coordinating care by the Attending physician.       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 Patient reports that since her surgery earlier in August she is not eating much at all and  has had diarrhea. She used to have 5-6 episodes of loose watery bowel movements every day and now have improved to 2-3 bowel movements daily. She has no fever or chills. ON Admission, she found to have no fever but Low Blood pressure, BP of 93/62. The stool studies are in process. The ID consult requested to assist with further evaluation and antibiotic management.     # Hypotension  # R/O Infectious etiology of diarrhea    would recommend:    1. Please obtain CT scan of abd/pelvis to r/o pathology  2. Stool fro C.difficile PCR is pending collection  3. Monitor BP and IVF boluses as needed  4. Follow up Blood cultures    d/w Covering NP, Jered and patient     will follow the patient with you and make further recommendation based on the clinical course and Lab results  Thank you for the opportunity to participate in Ms. JIMENEZ's care    Attending Attestation:    Spent more than 65 minutes on total encounter, more than 50 % of the visit was spent counseling and/or coordinating care by the Attending physician.       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 Patient reports that since her surgery earlier in August she is not eating much at all and  has had diarrhea. She used to have 5-6 episodes of loose watery bowel movements every day and now have improved to 2-3 bowel movements daily. She has no fever or chills. ON Admission, she found to have no fever but Low Blood pressure, BP of 93/62. The stool studies are in process. The ID consult requested to assist with further evaluation and antibiotic management.     # Hypotension  # R/O Infectious etiology of diarrhea    would recommend:    1. Please obtain CT scan of abd/pelvis to r/o pathology  2. Stool fro C.difficile PCR is pending collection  3. Monitor BP and IVF boluses as needed  4. Follow up Blood cultures    d/w Covering NP, Jered and patient     will follow the patient with you and make further recommendation based on the clinical course and Lab results  Thank you for the opportunity to participate in Ms. JIMENEZ's care    Attending Attestation:    Spent more than 65 minutes on total encounter, more than 50 % of the visit was spent counseling and/or coordinating care by the Attending physician.       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 Patient reports that since her surgery earlier in August she is not eating much at all and  has had diarrhea. She used to have 5-6 episodes of loose watery bowel movements every day and now have improved to 2-3 bowel movements daily. She has no fever or chills. ON Admission, she found to have no fever but Low Blood pressure, BP of 93/62. The stool studies are in process. The ID consult requested to assist with further evaluation and antibiotic management.     # Hypotension  # R/O Infectious etiology of diarrhea    would recommend:    1. Please obtain CT scan of abd/pelvis to r/o pathology  2. Stool fro C.difficile PCR is pending collection  3. Monitor BP and IVF boluses as needed  4. Follow up Blood cultures    d/w Covering NP, Jered and patient     will follow the patient with you and make further recommendation based on the clinical course and Lab results  Thank you for the opportunity to participate in Ms. JIMENEZ's care    Attending Attestation:    Spent more than 65 minutes on total encounter, more than 50 % of the visit was spent counseling and/or coordinating care by the Attending physician.

## 2023-09-01 NOTE — CHART NOTE - NSCHARTNOTEFT_GEN_A_CORE
NP rec'vd call from Radiologist Dr Thornton.  Per Dr. Thornton, pt has enteritis.  Additionally, there's a lot of SQ gas in anterior abdominal wall, from pelvis to lower chest.  Could be d/t robotics sx however, this far out there's a concern for necrotizing fasciitis.  NP verbalized understanding.    NP informed Attending.      Sx consult requested.  F/u recommendations NP rec'vd call from Radiologist Dr Thornton.  Per Dr. Thornton, pt has enteritis.  Additionally, there's a lot of SQ gas in anterior abdominal wall, from pelvis to lower chest.  Could be d/t robotics sx however, this far out there's a concern for necrotizing fasciitis.  NP verbalized understanding.    NP informed Attending & ID.        Sx consult requested.  F/u recommendations NP rec'vd call from Radiologist Dr Thornton.  Per Dr. Thornton, pt has Enteritis.  Additionally, there's a lot of SQ gas in anterior abdominal wall, from pelvis to lower chest.  Could be d/t h/o robotic sx.   However, this far out there's a concern for necrotizing fasciitis.  NP verbalized understanding.    NP informed Attending & ID.        Sx consult requested.  F/u recommendations

## 2023-09-01 NOTE — CONSULT NOTE ADULT - SUBJECTIVE AND OBJECTIVE BOX
[  ] STAT REQUEST              [ X ]ROUTINE REQUEST    Patient is a 78 year old female with persistent diarrhea. GI consulted to evaluate.        HPI:  Patient is a 78 year old female with past medical history significant for transverse colon neoplasm s/p colectomy in [facility] in early August 2023, HTN, HLD, CAD s/p LHC with TALI to RCA, Atrial fibrillation s/p watchman implant (3/23), hypothyroidism, PUJA, and TIA presented to the emergency room with 2 days history of lightheaded and feels lethargic. Patient reports when her blood pressure was checked in the facility it was in the low 80s. Patient reports that since her surgery earlier in August she is not eating much at all. She reports that since the surgery she has had diarrhea. Patient reports she used to have 5-6 episodes of loose watery bowel movements every day and now have improved to 2-3 bowel movements daily. Patient reports due to fear of worsening diarrhea she has decreased diet and oral intake. Patient reports she only takes soup and anything heavy makes her diarrhea worse associated with intermittent, diffuse, non radiating, 3/10 intensity, crampy abdominal pain. . Patient denies nausea, vomiting, hematemesis, hematochezia, melena, fever, chills, chest pain, SOB, palpitation, cough, hematuria, dysuria, recent traveling.          PAIN MANAGEMENT:  Pain Scale:                 3/10  Pain Location:  Diffuse crampy abdominal pain       PAST MEDICAL HISTORY    Atrial fibrillation    Hyperlipidemia    Hypertension    CAD (coronary artery disease)    Colon cancer    Hypothyroidism    Obstructive sleep apnea    Transient ischemic attack (TIA)             PAST SURGICAL HISTORY     Colectomy        Allergies    No Known Allergies    Intolerances  None         MEDICATIONS  (STANDING):  apixaban 5 milliGRAM(s) Oral every 12 hours  aspirin  chewable 81 milliGRAM(s) Oral daily  atorvastatin 20 milliGRAM(s) Oral at bedtime  gabapentin 100 milliGRAM(s) Oral every 8 hours  lactated ringers. 1000 milliLiter(s) (80 mL/Hr) IV Continuous <Continuous>  levETIRAcetam 750 milliGRAM(s) Oral two times a day  levothyroxine 88 MICROGram(s) Oral daily  melatonin 5 milliGRAM(s) Oral at bedtime  oxybutynin 10 milliGRAM(s) Oral daily  pantoprazole    Tablet 40 milliGRAM(s) Oral before breakfast  venlafaxine XR. 75 milliGRAM(s) Oral daily    MEDICATIONS  (PRN):  acetaminophen     Tablet .. 650 milliGRAM(s) Oral every 6 hours PRN Temp greater or equal to 38C (100.4F), Mild Pain (1 - 3)  albuterol    90 MICROgram(s) HFA Inhaler 2 Puff(s) Inhalation every 6 hours PRN Shortness of Breath and/or Wheezing  ALPRAZolam 0.25 milliGRAM(s) Oral two times a day PRN Anxiety/insomnia  ondansetron Injectable 4 milliGRAM(s) IV Push every 8 hours PRN Nausea and/or Vomiting      SOCIAL HISTORY  Advanced Directives:       [ X ] Full Code       [  ] DNR  Marital Status:         [  ] M      [ X ] S      [  ] D       [  ] W  Children:       [ X ] Yes      [  ] No  Occupation:        [  ] Employed       [ X ] Unemployed       [  ] Retired  Diet:       [ X ] Regular       [  ] PEG feeding          [  ] NG tube feeding  Drug Use:           [  ] Patient denied          [  ] Yes  Alcohol:           [ X ] No             [  ] Yes (socially)         [  ] Yes (chronic)  Tobacco:           [  ] Yes           [ X ] No      FAMILY HISTORY  [ X ] Heart Disease            [ X ] Diabetes             [ X ] HTN             [  ] Colon Cancer             [  ] Stomach Cancer              [  ] Pancreatic Cancer      VITAL SIGNS   Vital Signs Last 24 Hrs  T(C): 36.5 (01 Sep 2023 12:26), Max: 37.2 (01 Sep 2023 00:30)  T(F): 97.7 (01 Sep 2023 12:26), Max: 99 (01 Sep 2023 00:30)  HR: 82 (01 Sep 2023 12:26) (78 - 90)  BP: 106/70 (01 Sep 2023 12:26) (93/62 - 109/50)   RR: 18 (01 Sep 2023 12:26) (18 - 19)  SpO2: 96% (01 Sep 2023 12:26) (95% - 98%)  Parameters below as of 01 Sep 2023 12:26  Patient On (Oxygen Delivery Method): room air  O2 Flow (L/min): 1      CBC Full  -  ( 01 Sep 2023 08:24 )  WBC Count : 7.36 K/uL  RBC Count : 3.74 M/uL  Hemoglobin : 10.6 g/dL  Hematocrit : 32.3 %  Platelet Count - Automated : 495 K/uL  Mean Cell Volume : 86.4 fl  Mean Cell Hemoglobin : 28.3 pg  Mean Cell Hemoglobin Concentration : 32.8 gm/dL  Auto Neutrophil # : x  Auto Lymphocyte # : x  Auto Monocyte # : x  Auto Eosinophil # : x  Auto Basophil # : x  Auto Neutrophil % : x  Auto Lymphocyte % : x  Auto Monocyte % : x  Auto Eosinophil % : x  Auto Basophil % : x      09-01    137  |  106  |  35<H>  ----------------------------<  102<H>  3.5   |  22  |  0.79    Ca    7.4<L>      01 Sep 2023 08:24  Phos  2.8     09-01  Mg     1.6     09-01    TPro  6.0  /  Alb  2.1<L>  /  TBili  0.4  /  DBili  x   /  AST  8<L>  /  ALT  23  /  AlkPhos  103  08-31     Urinalysis (09.01.23 @ 03:10)   Glucose Qualitative, Urine: Negative mg/dL  Blood, Urine: Negative  pH Urine: 5.0  Color: Yellow  Urine Appearance: Cloudy  Bilirubin: Negative  Ketone - Urine: 15 mg/dL  Specific Gravity: 1.021  Protein, Urine: Trace mg/dL  Urobilinogen: 0.2 mg/dL  Nitrite: Negative  Leukocyte Esterase Concentration: Trace    RADIOLOGY/IMAGING                ACC: [id number] EXAM:  XR CHEST PORTABLE URGENT 1V   ORDERED BY: FELICIA JOHNSON     PROCEDURE DATE:  08/31/2023          INTERPRETATION:  INDICATION: Hypotension    Portable chest 10:49 PM    COMPARISON: None    FINDINGS:  Heart/Vascular: The heart size, mediastinum, hilum and aorta are   prominent which may be projectional. Atherosclerotic change. Loop   recorder device.  Pulmonary: Midline trachea. There is no focal infiltrate, congestion or   effusion.  Bones: There is no fracture.  Lines and catheter: None    Impression:    No acute pulmonary disease.             [  ] STAT REQUEST              [ X ]ROUTINE REQUEST    Patient is a 78 year old female with persistent diarrhea. GI consulted to evaluate.        HPI:  Patient is a 78 year old female with past medical history significant for transverse colon neoplasm s/p colectomy in [facility] in early August 2023, HTN, HLD, CAD s/p LHC with TALI to RCA, Atrial fibrillation s/p watchman implant (3/23), hypothyroidism, PUJA, and TIA presented to the emergency room with 2 days history of lightheaded and feels lethargic. Patient reports when her blood pressure was checked in the facility it was in the low 80s. Patient reports that since her surgery earlier in August she is not eating much at all. She reports that since the surgery she has had diarrhea. Patient reports she used to have 5-6 episodes of loose watery bowel movements every day and now have improved to 2-3 bowel movements daily. Patient reports due to fear of worsening diarrhea she has decreased diet and oral intake. Patient reports she only takes soup and anything heavy makes her diarrhea worse associated with intermittent, diffuse, non radiating, 3/10 intensity, crampy abdominal pain. . Patient denies nausea, vomiting, hematemesis, hematochezia, melena, fever, chills, chest pain, SOB, palpitation, cough, hematuria, dysuria, recent traveling.          PAIN MANAGEMENT:  Pain Scale:                 3/10  Pain Location:  Diffuse crampy abdominal pain       PAST MEDICAL HISTORY    Atrial fibrillation    Hyperlipidemia    Hypertension    CAD (coronary artery disease)    Colon cancer    Hypothyroidism    Obstructive sleep apnea    Transient ischemic attack (TIA)             PAST SURGICAL HISTORY     Colectomy        Allergies    No Known Allergies    Intolerances  None         MEDICATIONS  (STANDING):  apixaban 5 milliGRAM(s) Oral every 12 hours  aspirin  chewable 81 milliGRAM(s) Oral daily  atorvastatin 20 milliGRAM(s) Oral at bedtime  gabapentin 100 milliGRAM(s) Oral every 8 hours  lactated ringers. 1000 milliLiter(s) (80 mL/Hr) IV Continuous <Continuous>  levETIRAcetam 750 milliGRAM(s) Oral two times a day  levothyroxine 88 MICROGram(s) Oral daily  melatonin 5 milliGRAM(s) Oral at bedtime  oxybutynin 10 milliGRAM(s) Oral daily  pantoprazole    Tablet 40 milliGRAM(s) Oral before breakfast  venlafaxine XR. 75 milliGRAM(s) Oral daily    MEDICATIONS  (PRN):  acetaminophen     Tablet .. 650 milliGRAM(s) Oral every 6 hours PRN Temp greater or equal to 38C (100.4F), Mild Pain (1 - 3)  albuterol    90 MICROgram(s) HFA Inhaler 2 Puff(s) Inhalation every 6 hours PRN Shortness of Breath and/or Wheezing  ALPRAZolam 0.25 milliGRAM(s) Oral two times a day PRN Anxiety/insomnia  ondansetron Injectable 4 milliGRAM(s) IV Push every 8 hours PRN Nausea and/or Vomiting      SOCIAL HISTORY  Advanced Directives:       [ X ] Full Code       [  ] DNR  Marital Status:         [  ] M      [ X ] S      [  ] D       [  ] W  Children:       [ X ] Yes      [  ] No  Occupation:        [  ] Employed       [ X ] Unemployed       [  ] Retired  Diet:       [ X ] Regular       [  ] PEG feeding          [  ] NG tube feeding  Drug Use:           [  ] Patient denied          [  ] Yes  Alcohol:           [ X ] No             [  ] Yes (socially)         [  ] Yes (chronic)  Tobacco:           [  ] Yes           [ X ] No      FAMILY HISTORY  [ X ] Heart Disease            [ X ] Diabetes             [ X ] HTN             [  ] Colon Cancer             [  ] Stomach Cancer              [  ] Pancreatic Cancer      VITAL SIGNS   Vital Signs Last 24 Hrs  T(C): 36.5 (01 Sep 2023 12:26), Max: 37.2 (01 Sep 2023 00:30)  T(F): 97.7 (01 Sep 2023 12:26), Max: 99 (01 Sep 2023 00:30)  HR: 82 (01 Sep 2023 12:26) (78 - 90)  BP: 106/70 (01 Sep 2023 12:26) (93/62 - 109/50)   RR: 18 (01 Sep 2023 12:26) (18 - 19)  SpO2: 96% (01 Sep 2023 12:26) (95% - 98%)  Parameters below as of 01 Sep 2023 12:26  Patient On (Oxygen Delivery Method): room air  O2 Flow (L/min): 1      CBC Full  -  ( 01 Sep 2023 08:24 )  WBC Count : 7.36 K/uL  RBC Count : 3.74 M/uL  Hemoglobin : 10.6 g/dL  Hematocrit : 32.3 %  Platelet Count - Automated : 495 K/uL  Mean Cell Volume : 86.4 fl  Mean Cell Hemoglobin : 28.3 pg  Mean Cell Hemoglobin Concentration : 32.8 gm/dL  Auto Neutrophil # : x  Auto Lymphocyte # : x  Auto Monocyte # : x  Auto Eosinophil # : x  Auto Basophil # : x  Auto Neutrophil % : x  Auto Lymphocyte % : x  Auto Monocyte % : x  Auto Eosinophil % : x  Auto Basophil % : x      09-01    137  |  106  |  35<H>  ----------------------------<  102<H>  3.5   |  22  |  0.79    Ca    7.4<L>      01 Sep 2023 08:24  Phos  2.8     09-01  Mg     1.6     09-01    TPro  6.0  /  Alb  2.1<L>  /  TBili  0.4  /  DBili  x   /  AST  8<L>  /  ALT  23  /  AlkPhos  103  08-31     Urinalysis (09.01.23 @ 03:10)   Glucose Qualitative, Urine: Negative mg/dL  Blood, Urine: Negative  pH Urine: 5.0  Color: Yellow  Urine Appearance: Cloudy  Bilirubin: Negative  Ketone - Urine: 15 mg/dL  Specific Gravity: 1.021  Protein, Urine: Trace mg/dL  Urobilinogen: 0.2 mg/dL  Nitrite: Negative  Leukocyte Esterase Concentration: Trace    RADIOLOGY/IMAGING                ACC: [id number] EXAM:  XR CHEST PORTABLE URGENT 1V   ORDERED BY: FELICIA JOHNSON     PROCEDURE DATE:  08/31/2023          INTERPRETATION:  INDICATION: Hypotension    Portable chest 10:49 PM    COMPARISON: None    FINDINGS:  Heart/Vascular: The heart size, mediastinum, hilum and aorta are   prominent which may be projectional. Atherosclerotic change. Loop   recorder device.  Pulmonary: Midline trachea. There is no focal infiltrate, congestion or   effusion.  Bones: There is no fracture.  Lines and catheter: None    Impression:    No acute pulmonary disease.             [  ] STAT REQUEST              [ X ]ROUTINE REQUEST    Patient is a 78 year old female with persistent diarrhea. GI consulted to evaluate.        HPI:  Patient is a 78 year old female with past medical history significant for transverse colon neoplasm s/p colectomy in [facility] in early August 2023, HTN, HLD, CAD s/p LHC with TALI to RCA, Atrial fibrillation s/p watchman implant (3/23), hypothyroidism, PUJA, and TIA presented to the emergency room with 2 days history of lightheaded and feels lethargic. Patient reports when her blood pressure was checked in the facility it was in the low 80s. Patient reports that since her surgery earlier in August she is not eating much at all. She reports that since the surgery she has had diarrhea. Patient reports she used to have 5-6 episodes of loose watery bowel movements every day and now have improved to 2-3 bowel movements daily. Patient reports due to fear of worsening diarrhea she has decreased diet and oral intake. Patient reports she only takes soup and anything heavy makes her diarrhea worse associated with intermittent, diffuse, non radiating, 3/10 intensity, crampy abdominal pain. . Patient denies nausea, vomiting, hematemesis, hematochezia, melena, fever, chills, chest pain, SOB, palpitation, cough, hematuria, dysuria, recent traveling.          PAIN MANAGEMENT:  Pain Scale:                 3/10  Pain Location:  Diffuse crampy abdominal pain       PAST MEDICAL HISTORY    Atrial fibrillation    Hyperlipidemia    Hypertension    CAD (coronary artery disease)    Colon cancer    Hypothyroidism    Obstructive sleep apnea    Transient ischemic attack (TIA)             PAST SURGICAL HISTORY     Colectomy        Allergies    No Known Allergies    Intolerances  None         MEDICATIONS  (STANDING):  apixaban 5 milliGRAM(s) Oral every 12 hours  aspirin  chewable 81 milliGRAM(s) Oral daily  atorvastatin 20 milliGRAM(s) Oral at bedtime  gabapentin 100 milliGRAM(s) Oral every 8 hours  lactated ringers. 1000 milliLiter(s) (80 mL/Hr) IV Continuous <Continuous>  levETIRAcetam 750 milliGRAM(s) Oral two times a day  levothyroxine 88 MICROGram(s) Oral daily  melatonin 5 milliGRAM(s) Oral at bedtime  oxybutynin 10 milliGRAM(s) Oral daily  pantoprazole    Tablet 40 milliGRAM(s) Oral before breakfast  venlafaxine XR. 75 milliGRAM(s) Oral daily    MEDICATIONS  (PRN):  acetaminophen     Tablet .. 650 milliGRAM(s) Oral every 6 hours PRN Temp greater or equal to 38C (100.4F), Mild Pain (1 - 3)  albuterol    90 MICROgram(s) HFA Inhaler 2 Puff(s) Inhalation every 6 hours PRN Shortness of Breath and/or Wheezing  ALPRAZolam 0.25 milliGRAM(s) Oral two times a day PRN Anxiety/insomnia  ondansetron Injectable 4 milliGRAM(s) IV Push every 8 hours PRN Nausea and/or Vomiting      SOCIAL HISTORY  Advanced Directives:       [ X ] Full Code       [  ] DNR  Marital Status:         [  ] M      [ X ] S      [  ] D       [  ] W  Children:       [ X ] Yes      [  ] No  Occupation:        [  ] Employed       [ X ] Unemployed       [  ] Retired  Diet:       [ X ] Regular       [  ] PEG feeding          [  ] NG tube feeding  Drug Use:           [  ] Patient denied          [  ] Yes  Alcohol:           [ X ] No             [  ] Yes (socially)         [  ] Yes (chronic)  Tobacco:           [  ] Yes           [ X ] No      FAMILY HISTORY  [ X ] Heart Disease            [ X ] Diabetes             [ X ] HTN             [  ] Colon Cancer             [  ] Stomach Cancer              [  ] Pancreatic Cancer      VITAL SIGNS   Vital Signs Last 24 Hrs  T(C): 36.5 (01 Sep 2023 12:26), Max: 37.2 (01 Sep 2023 00:30)  T(F): 97.7 (01 Sep 2023 12:26), Max: 99 (01 Sep 2023 00:30)  HR: 82 (01 Sep 2023 12:26) (78 - 90)  BP: 106/70 (01 Sep 2023 12:26) (93/62 - 109/50)   RR: 18 (01 Sep 2023 12:26) (18 - 19)  SpO2: 96% (01 Sep 2023 12:26) (95% - 98%)  Parameters below as of 01 Sep 2023 12:26  Patient On (Oxygen Delivery Method): room air  O2 Flow (L/min): 1      CBC Full  -  ( 01 Sep 2023 08:24 )  WBC Count : 7.36 K/uL  RBC Count : 3.74 M/uL  Hemoglobin : 10.6 g/dL  Hematocrit : 32.3 %  Platelet Count - Automated : 495 K/uL  Mean Cell Volume : 86.4 fl  Mean Cell Hemoglobin : 28.3 pg  Mean Cell Hemoglobin Concentration : 32.8 gm/dL  Auto Neutrophil # : x  Auto Lymphocyte # : x  Auto Monocyte # : x  Auto Eosinophil # : x  Auto Basophil # : x  Auto Neutrophil % : x  Auto Lymphocyte % : x  Auto Monocyte % : x  Auto Eosinophil % : x  Auto Basophil % : x      09-01    137  |  106  |  35<H>  ----------------------------<  102<H>  3.5   |  22  |  0.79    Ca    7.4<L>      01 Sep 2023 08:24  Phos  2.8     09-01  Mg     1.6     09-01    TPro  6.0  /  Alb  2.1<L>  /  TBili  0.4  /  DBili  x   /  AST  8<L>  /  ALT  23  /  AlkPhos  103  08-31     Urinalysis (09.01.23 @ 03:10)   Glucose Qualitative, Urine: Negative mg/dL  Blood, Urine: Negative  pH Urine: 5.0  Color: Yellow  Urine Appearance: Cloudy  Bilirubin: Negative  Ketone - Urine: 15 mg/dL  Specific Gravity: 1.021  Protein, Urine: Trace mg/dL  Urobilinogen: 0.2 mg/dL  Nitrite: Negative  Leukocyte Esterase Concentration: Trace    RADIOLOGY/IMAGING                ACC: [id number] EXAM:  XR CHEST PORTABLE URGENT 1V   ORDERED BY: FELICIA JOHNSON     PROCEDURE DATE:  08/31/2023          INTERPRETATION:  INDICATION: Hypotension    Portable chest 10:49 PM    COMPARISON: None    FINDINGS:  Heart/Vascular: The heart size, mediastinum, hilum and aorta are   prominent which may be projectional. Atherosclerotic change. Loop   recorder device.  Pulmonary: Midline trachea. There is no focal infiltrate, congestion or   effusion.  Bones: There is no fracture.  Lines and catheter: None    Impression:    No acute pulmonary disease.

## 2023-09-01 NOTE — CONSULT NOTE ADULT - SUBJECTIVE AND OBJECTIVE BOX
Attending: Dr. Celeste      HPI:  77 y/o F with PMH of HTN, HLD, CAD s/p LHC with TALI to RCA, Atrial fibrillation s/p watchman implant (3/23), hypothyroidism, PUJA (not on cpap), TIA, hx familial polyposis s/p Robotic Assisted colectomy with ileorectal anastomosis at [facility] w/ Dr. Mckeon ([phone]) early August, admitted to medical services w/ hypotension.   General Surgery Consult called re extensive subcutaneous emphysema in the anterior abdominal wall, r/o necrotizing fascitis, as seen on CT abd/pelvis. Pt seen and examined at bedside, daughter present via phone, pt admits to multiple episodes of diarrhea, denies abdominal pain, no nausea or vomiting. Pt states she underwent Robotic Assisted colectomy with ileorectal anastomosis at [facility] w/ Dr. Mckeon early August, post operative period complicated by post op ileus, post op ileus treated conservatively w/ NGT; pt was discharged to [facility], pt states she hasn't been well taken care of at [facility]. Pt denies pain at the incisions sites, no redness, no drainage. Pt denies fever, chills, SOB or CP. Offers no other complaints.       PAST MEDICAL & SURGICAL HISTORY:  Atrial fibrillation  Hyperlipidemia  Hypertension  CAD (coronary artery disease)  Colon cancer  Hypothyroidism  Obstructive sleep apnea  Transient ischemic attack (TIA)  Atrial fibrillation    H/O colectomy      MEDICATIONS  (STANDING):  apixaban 5 milliGRAM(s) Oral every 12 hours  aspirin  chewable 81 milliGRAM(s) Oral daily  atorvastatin 20 milliGRAM(s) Oral at bedtime  gabapentin 100 milliGRAM(s) Oral every 8 hours  lactated ringers. 1000 milliLiter(s) (80 mL/Hr) IV Continuous <Continuous>  levETIRAcetam 750 milliGRAM(s) Oral two times a day  levothyroxine 88 MICROGram(s) Oral daily  melatonin 5 milliGRAM(s) Oral at bedtime  oxybutynin 10 milliGRAM(s) Oral daily  pantoprazole    Tablet 40 milliGRAM(s) Oral before breakfast  venlafaxine XR. 75 milliGRAM(s) Oral daily    MEDICATIONS  (PRN):  acetaminophen     Tablet .. 650 milliGRAM(s) Oral every 6 hours PRN Temp greater or equal to 38C (100.4F), Mild Pain (1 - 3)  albuterol    90 MICROgram(s) HFA Inhaler 2 Puff(s) Inhalation every 6 hours PRN Shortness of Breath and/or Wheezing  ALPRAZolam 0.25 milliGRAM(s) Oral two times a day PRN Anxiety/insomnia  ondansetron Injectable 4 milliGRAM(s) IV Push every 8 hours PRN Nausea and/or Vomiting      Allergies  No Known Allergies  Intolerances      SOCIAL HISTORY:  Unknown   FAMILY HISTORY:  Unknown     Vital Signs Last 24 Hrs  T(C): 36.5 (01 Sep 2023 12:26), Max: 37.2 (01 Sep 2023 00:30)  T(F): 97.7 (01 Sep 2023 12:26), Max: 99 (01 Sep 2023 00:30)  HR: 82 (01 Sep 2023 12:26) (78 - 90)  BP: 106/70 (01 Sep 2023 12:26) (93/62 - 109/50)  BP(mean): --  RR: 18 (01 Sep 2023 12:26) (18 - 19)  SpO2: 96% (01 Sep 2023 12:26) (95% - 98%)    Parameters below as of 01 Sep 2023 12:26  Patient On (Oxygen Delivery Method): room air  O2 Flow (L/min): 1      I&O's Summary      LABS:                        10.6   7.36  )-----------( 495      ( 01 Sep 2023 08:24 )             32.3     09-01    137  |  106  |  35<H>  ----------------------------<  102<H>  3.5   |  22  |  0.79    Ca    7.4<L>      01 Sep 2023 08:24  Phos  2.8     09-01  Mg     1.6     09-01    TPro  6.0  /  Alb  2.1<L>  /  TBili  0.4  /  DBili  x   /  AST  8<L>  /  ALT  23  /  AlkPhos  103  08-31      Urinalysis Basic - ( 01 Sep 2023 08:24 )    Color: x / Appearance: x / SG: x / pH: x  Gluc: 102 mg/dL / Ketone: x  / Bili: x / Urobili: x   Blood: x / Protein: x / Nitrite: x   Leuk Esterase: x / RBC: x / WBC x   Sq Epi: x / Non Sq Epi: x / Bacteria: x    CAPILLARY BLOOD GLUCOSE    LIVER FUNCTIONS - ( 31 Aug 2023 21:30 )  Alb: 2.1 g/dL / Pro: 6.0 g/dL / ALK PHOS: 103 U/L / ALT: 23 U/L DA / AST: 8 U/L / GGT: x           Cultures:  Culture Results:   Testing in progress (09-01 @ 03:30)      RADIOLOGY & ADDITIONAL STUDIES:  < from: CT Abdomen and Pelvis w/ Oral Cont (09.01.23 @ 15:20) >  FINDINGS:  LOWER CHEST: Minor atelectasis. Partially visualized left adrenal    LIVER: Fatty infiltration of liver.  BILE DUCTS: Normal caliber.  GALLBLADDER: Cholecystectomy.  SPLEEN: Within normal limits.  PANCREAS: Fatty atrophy.  ADRENALS: Within normal limits.  KIDNEYS/URETERS: 2 cm left upper pole renal cyst. Left upper pole renal   angiomyolipoma measures 1.3 cm.    BLADDER: Within normal limits.  REPRODUCTIVE ORGANS: Enlarged myomatous uterus.    BOWEL: No bowel obstruction. Status post colectomy with ileorectal   anastomosis. Fluid distends the small bowel and rectum.  PERITONEUM: No ascites.  VESSELS: Within normal limits.  RETROPERITONEUM/LYMPH NODES: No lymphadenopathy.  ABDOMINAL WALL: Postoperative changes. Subcutaneous emphysema in the   anterior abdominal wall extending from the pelvis to the lower chest.   Small fluid collection in the right lower abdominal wall measuring 3.5 x   1.9 cm, which may be postoperative.  BONES: Degenerative changes.    IMPRESSION:  Status post colectomy with ileorectal anastomosis.    Fluid throughout nonobstructed bowel and rectum, consistent with   enteritis.    Extensive subcutaneous emphysema in the anterior abdominal wall. While   findings may be postoperative given the history of recent surgery, in the   appropriate clinical context, necrotizing infection may be considered.   Careful clinical evaluation is therefore warranted.    These findings were discussed with Dr. MARQUITA MORFIN [phone] at   9/1/2023 3:43 PM with readback.    --- End of Report ---    < end of copied text >   Attending: Dr. Celeste      HPI:  79 y/o F with PMH of HTN, HLD, CAD s/p LHC with TALI to RCA, Atrial fibrillation s/p watchman implant (3/23), hypothyroidism, PUJA (not on cpap), TIA, hx familial polyposis s/p Robotic Assisted colectomy with ileorectal anastomosis at [facility] w/ Dr. Mckeon ([phone]) early August, admitted to medical services w/ hypotension.   General Surgery Consult called re extensive subcutaneous emphysema in the anterior abdominal wall, r/o necrotizing fascitis, as seen on CT abd/pelvis. Pt seen and examined at bedside, daughter present via phone, pt admits to multiple episodes of diarrhea, denies abdominal pain, no nausea or vomiting. Pt states she underwent Robotic Assisted colectomy with ileorectal anastomosis at [facility] w/ Dr. Mckeon early August, post operative period complicated by post op ileus, post op ileus treated conservatively w/ NGT; pt was discharged to [facility], pt states she hasn't been well taken care of at [facility]. Pt denies pain at the incisions sites, no redness, no drainage. Pt denies fever, chills, SOB or CP. Offers no other complaints.       PAST MEDICAL & SURGICAL HISTORY:  Atrial fibrillation  Hyperlipidemia  Hypertension  CAD (coronary artery disease)  Colon cancer  Hypothyroidism  Obstructive sleep apnea  Transient ischemic attack (TIA)  Atrial fibrillation    H/O colectomy      MEDICATIONS  (STANDING):  apixaban 5 milliGRAM(s) Oral every 12 hours  aspirin  chewable 81 milliGRAM(s) Oral daily  atorvastatin 20 milliGRAM(s) Oral at bedtime  gabapentin 100 milliGRAM(s) Oral every 8 hours  lactated ringers. 1000 milliLiter(s) (80 mL/Hr) IV Continuous <Continuous>  levETIRAcetam 750 milliGRAM(s) Oral two times a day  levothyroxine 88 MICROGram(s) Oral daily  melatonin 5 milliGRAM(s) Oral at bedtime  oxybutynin 10 milliGRAM(s) Oral daily  pantoprazole    Tablet 40 milliGRAM(s) Oral before breakfast  venlafaxine XR. 75 milliGRAM(s) Oral daily    MEDICATIONS  (PRN):  acetaminophen     Tablet .. 650 milliGRAM(s) Oral every 6 hours PRN Temp greater or equal to 38C (100.4F), Mild Pain (1 - 3)  albuterol    90 MICROgram(s) HFA Inhaler 2 Puff(s) Inhalation every 6 hours PRN Shortness of Breath and/or Wheezing  ALPRAZolam 0.25 milliGRAM(s) Oral two times a day PRN Anxiety/insomnia  ondansetron Injectable 4 milliGRAM(s) IV Push every 8 hours PRN Nausea and/or Vomiting      Allergies  No Known Allergies  Intolerances      SOCIAL HISTORY:  Unknown   FAMILY HISTORY:  Unknown     Vital Signs Last 24 Hrs  T(C): 36.5 (01 Sep 2023 12:26), Max: 37.2 (01 Sep 2023 00:30)  T(F): 97.7 (01 Sep 2023 12:26), Max: 99 (01 Sep 2023 00:30)  HR: 82 (01 Sep 2023 12:26) (78 - 90)  BP: 106/70 (01 Sep 2023 12:26) (93/62 - 109/50)  BP(mean): --  RR: 18 (01 Sep 2023 12:26) (18 - 19)  SpO2: 96% (01 Sep 2023 12:26) (95% - 98%)    Parameters below as of 01 Sep 2023 12:26  Patient On (Oxygen Delivery Method): room air  O2 Flow (L/min): 1      I&O's Summary      LABS:                        10.6   7.36  )-----------( 495      ( 01 Sep 2023 08:24 )             32.3     09-01    137  |  106  |  35<H>  ----------------------------<  102<H>  3.5   |  22  |  0.79    Ca    7.4<L>      01 Sep 2023 08:24  Phos  2.8     09-01  Mg     1.6     09-01    TPro  6.0  /  Alb  2.1<L>  /  TBili  0.4  /  DBili  x   /  AST  8<L>  /  ALT  23  /  AlkPhos  103  08-31      Urinalysis Basic - ( 01 Sep 2023 08:24 )    Color: x / Appearance: x / SG: x / pH: x  Gluc: 102 mg/dL / Ketone: x  / Bili: x / Urobili: x   Blood: x / Protein: x / Nitrite: x   Leuk Esterase: x / RBC: x / WBC x   Sq Epi: x / Non Sq Epi: x / Bacteria: x    CAPILLARY BLOOD GLUCOSE    LIVER FUNCTIONS - ( 31 Aug 2023 21:30 )  Alb: 2.1 g/dL / Pro: 6.0 g/dL / ALK PHOS: 103 U/L / ALT: 23 U/L DA / AST: 8 U/L / GGT: x           Cultures:  Culture Results:   Testing in progress (09-01 @ 03:30)      RADIOLOGY & ADDITIONAL STUDIES:  < from: CT Abdomen and Pelvis w/ Oral Cont (09.01.23 @ 15:20) >  FINDINGS:  LOWER CHEST: Minor atelectasis. Partially visualized left adrenal    LIVER: Fatty infiltration of liver.  BILE DUCTS: Normal caliber.  GALLBLADDER: Cholecystectomy.  SPLEEN: Within normal limits.  PANCREAS: Fatty atrophy.  ADRENALS: Within normal limits.  KIDNEYS/URETERS: 2 cm left upper pole renal cyst. Left upper pole renal   angiomyolipoma measures 1.3 cm.    BLADDER: Within normal limits.  REPRODUCTIVE ORGANS: Enlarged myomatous uterus.    BOWEL: No bowel obstruction. Status post colectomy with ileorectal   anastomosis. Fluid distends the small bowel and rectum.  PERITONEUM: No ascites.  VESSELS: Within normal limits.  RETROPERITONEUM/LYMPH NODES: No lymphadenopathy.  ABDOMINAL WALL: Postoperative changes. Subcutaneous emphysema in the   anterior abdominal wall extending from the pelvis to the lower chest.   Small fluid collection in the right lower abdominal wall measuring 3.5 x   1.9 cm, which may be postoperative.  BONES: Degenerative changes.    IMPRESSION:  Status post colectomy with ileorectal anastomosis.    Fluid throughout nonobstructed bowel and rectum, consistent with   enteritis.    Extensive subcutaneous emphysema in the anterior abdominal wall. While   findings may be postoperative given the history of recent surgery, in the   appropriate clinical context, necrotizing infection may be considered.   Careful clinical evaluation is therefore warranted.    These findings were discussed with Dr. MARQUITA MORFIN [phone] at   9/1/2023 3:43 PM with readback.    --- End of Report ---    < end of copied text >   Attending: Dr. Celeste      HPI:  77 y/o F with PMH of HTN, HLD, CAD s/p LHC with TALI to RCA, Atrial fibrillation s/p watchman implant (3/23), hypothyroidism, PUJA (not on cpap), TIA, hx familial polyposis s/p Robotic Assisted colectomy with ileorectal anastomosis at [facility] w/ Dr. Mckeon ([phone]) early August, admitted to medical services w/ hypotension.   General Surgery Consult called re extensive subcutaneous emphysema in the anterior abdominal wall, r/o necrotizing fascitis, as seen on CT abd/pelvis. Pt seen and examined at bedside, daughter present via phone, pt admits to multiple episodes of diarrhea, denies abdominal pain, no nausea or vomiting. Pt states she underwent Robotic Assisted colectomy with ileorectal anastomosis at [facility] w/ Dr. Mckeon early August, post operative period complicated by post op ileus, post op ileus treated conservatively w/ NGT; pt was discharged to [facility], pt states she hasn't been well taken care of at [facility]. Pt denies pain at the incisions sites, no redness, no drainage. Pt denies fever, chills, SOB or CP. Offers no other complaints.       PAST MEDICAL & SURGICAL HISTORY:  Atrial fibrillation  Hyperlipidemia  Hypertension  CAD (coronary artery disease)  Colon cancer  Hypothyroidism  Obstructive sleep apnea  Transient ischemic attack (TIA)  Atrial fibrillation    H/O colectomy      MEDICATIONS  (STANDING):  apixaban 5 milliGRAM(s) Oral every 12 hours  aspirin  chewable 81 milliGRAM(s) Oral daily  atorvastatin 20 milliGRAM(s) Oral at bedtime  gabapentin 100 milliGRAM(s) Oral every 8 hours  lactated ringers. 1000 milliLiter(s) (80 mL/Hr) IV Continuous <Continuous>  levETIRAcetam 750 milliGRAM(s) Oral two times a day  levothyroxine 88 MICROGram(s) Oral daily  melatonin 5 milliGRAM(s) Oral at bedtime  oxybutynin 10 milliGRAM(s) Oral daily  pantoprazole    Tablet 40 milliGRAM(s) Oral before breakfast  venlafaxine XR. 75 milliGRAM(s) Oral daily    MEDICATIONS  (PRN):  acetaminophen     Tablet .. 650 milliGRAM(s) Oral every 6 hours PRN Temp greater or equal to 38C (100.4F), Mild Pain (1 - 3)  albuterol    90 MICROgram(s) HFA Inhaler 2 Puff(s) Inhalation every 6 hours PRN Shortness of Breath and/or Wheezing  ALPRAZolam 0.25 milliGRAM(s) Oral two times a day PRN Anxiety/insomnia  ondansetron Injectable 4 milliGRAM(s) IV Push every 8 hours PRN Nausea and/or Vomiting      Allergies  No Known Allergies  Intolerances      SOCIAL HISTORY:  Unknown   FAMILY HISTORY:  Unknown     Vital Signs Last 24 Hrs  T(C): 36.5 (01 Sep 2023 12:26), Max: 37.2 (01 Sep 2023 00:30)  T(F): 97.7 (01 Sep 2023 12:26), Max: 99 (01 Sep 2023 00:30)  HR: 82 (01 Sep 2023 12:26) (78 - 90)  BP: 106/70 (01 Sep 2023 12:26) (93/62 - 109/50)  BP(mean): --  RR: 18 (01 Sep 2023 12:26) (18 - 19)  SpO2: 96% (01 Sep 2023 12:26) (95% - 98%)    Parameters below as of 01 Sep 2023 12:26  Patient On (Oxygen Delivery Method): room air  O2 Flow (L/min): 1      I&O's Summary      LABS:                        10.6   7.36  )-----------( 495      ( 01 Sep 2023 08:24 )             32.3     09-01    137  |  106  |  35<H>  ----------------------------<  102<H>  3.5   |  22  |  0.79    Ca    7.4<L>      01 Sep 2023 08:24  Phos  2.8     09-01  Mg     1.6     09-01    TPro  6.0  /  Alb  2.1<L>  /  TBili  0.4  /  DBili  x   /  AST  8<L>  /  ALT  23  /  AlkPhos  103  08-31      Urinalysis Basic - ( 01 Sep 2023 08:24 )    Color: x / Appearance: x / SG: x / pH: x  Gluc: 102 mg/dL / Ketone: x  / Bili: x / Urobili: x   Blood: x / Protein: x / Nitrite: x   Leuk Esterase: x / RBC: x / WBC x   Sq Epi: x / Non Sq Epi: x / Bacteria: x    CAPILLARY BLOOD GLUCOSE    LIVER FUNCTIONS - ( 31 Aug 2023 21:30 )  Alb: 2.1 g/dL / Pro: 6.0 g/dL / ALK PHOS: 103 U/L / ALT: 23 U/L DA / AST: 8 U/L / GGT: x           Cultures:  Culture Results:   Testing in progress (09-01 @ 03:30)      RADIOLOGY & ADDITIONAL STUDIES:  < from: CT Abdomen and Pelvis w/ Oral Cont (09.01.23 @ 15:20) >  FINDINGS:  LOWER CHEST: Minor atelectasis. Partially visualized left adrenal    LIVER: Fatty infiltration of liver.  BILE DUCTS: Normal caliber.  GALLBLADDER: Cholecystectomy.  SPLEEN: Within normal limits.  PANCREAS: Fatty atrophy.  ADRENALS: Within normal limits.  KIDNEYS/URETERS: 2 cm left upper pole renal cyst. Left upper pole renal   angiomyolipoma measures 1.3 cm.    BLADDER: Within normal limits.  REPRODUCTIVE ORGANS: Enlarged myomatous uterus.    BOWEL: No bowel obstruction. Status post colectomy with ileorectal   anastomosis. Fluid distends the small bowel and rectum.  PERITONEUM: No ascites.  VESSELS: Within normal limits.  RETROPERITONEUM/LYMPH NODES: No lymphadenopathy.  ABDOMINAL WALL: Postoperative changes. Subcutaneous emphysema in the   anterior abdominal wall extending from the pelvis to the lower chest.   Small fluid collection in the right lower abdominal wall measuring 3.5 x   1.9 cm, which may be postoperative.  BONES: Degenerative changes.    IMPRESSION:  Status post colectomy with ileorectal anastomosis.    Fluid throughout nonobstructed bowel and rectum, consistent with   enteritis.    Extensive subcutaneous emphysema in the anterior abdominal wall. While   findings may be postoperative given the history of recent surgery, in the   appropriate clinical context, necrotizing infection may be considered.   Careful clinical evaluation is therefore warranted.    These findings were discussed with Dr. MARQUITA MORFIN [phone] at   9/1/2023 3:43 PM with readback.    --- End of Report ---    < end of copied text >

## 2023-09-01 NOTE — CONSULT NOTE ADULT - ASSESSMENT
77 y/o Female w/ Extensive subcutaneous emphysema in the anterior abdominal wall, r/o necrotizing fascitis; hx of Robotic Colon Resection  Afebrile, VSS, wbc wnl, electrolytes wnl, lactate wnl     CT abd/pelvis images reviewed and d/w Dr. Celeste, Extensive subcutaneous emphysema in the anterior abdominal wall most likely related to recent surgery, necrotizing fascitis very unlikely.     -Serial abdominal exams/ observation   -No acute surgical intervention indicated at present time   -No role of abx tx   -Called Dr. Mckeon w/ [facility] at [phone], message left with answering service   -D/w Dr. Celeste and agrees  79 y/o Female w/ Extensive subcutaneous emphysema in the anterior abdominal wall, r/o necrotizing fascitis; hx of Robotic Colon Resection  Afebrile, VSS, wbc wnl, electrolytes wnl, lactate wnl     CT abd/pelvis images reviewed and d/w Dr. Celeste, Extensive subcutaneous emphysema in the anterior abdominal wall most likely related to recent surgery, necrotizing fascitis very unlikely.     -Serial abdominal exams/ observation   -No acute surgical intervention indicated at present time   -No role of abx tx   -Called Dr. Mckeon w/ [facility] at [phone], message left with answering service   -D/w Dr. Celeste and agrees  79 y/o Female w/ Extensive subcutaneous emphysema in the anterior abdominal wall, r/o necrotizing fascitis; hx of Robotic Colon Resection  Afebrile, VSS, wbc wnl, electrolytes wnl, lactate wnl     CT abd/pelvis images reviewed and d/w Dr. Celeste, Extensive subcutaneous emphysema in the anterior abdominal wall most likely related to recent surgery, necrotizing fascitis very unlikely.     -Serial abdominal exams/ observation   -No acute surgical intervention indicated at present time   -No role of abx tx   -Called Dr. Mckeon w/ [facility] at [phone], message left with answering service   -D/w Dr. Celeste and agrees

## 2023-09-01 NOTE — PATIENT PROFILE ADULT - FALL HARM RISK - HARM RISK INTERVENTIONS
Assistance with ambulation/Assistance OOB with selected safe patient handling equipment/Communicate Risk of Fall with Harm to all staff/Discuss with provider need for PT consult/Monitor gait and stability/Provide patient with walking aids - walker, cane, crutches/Reinforce activity limits and safety measures with patient and family/Tailored Fall Risk Interventions/Visual Cue: Yellow wristband and red socks/Bed in lowest position, wheels locked, appropriate side rails in place/Call bell, personal items and telephone in reach/Instruct patient to call for assistance before getting out of bed or chair/Non-slip footwear when patient is out of bed/[place] to call system/Physically safe environment - no spills, clutter or unnecessary equipment/Purposeful Proactive Rounding/Room/bathroom lighting operational, light cord in reach Assistance with ambulation/Assistance OOB with selected safe patient handling equipment/Communicate Risk of Fall with Harm to all staff/Discuss with provider need for PT consult/Monitor gait and stability/Provide patient with walking aids - walker, cane, crutches/Reinforce activity limits and safety measures with patient and family/Tailored Fall Risk Interventions/Visual Cue: Yellow wristband and red socks/Bed in lowest position, wheels locked, appropriate side rails in place/Call bell, personal items and telephone in reach/Instruct patient to call for assistance before getting out of bed or chair/Non-slip footwear when patient is out of bed/[place] to call system/Physically safe environment - no spills, clutter or unnecessary equipment/Purposeful Proactive Rounding/Room/bathroom lighting operational, light cord in reach Assistance with ambulation/Assistance OOB with selected safe patient handling equipment/Communicate Risk of Fall with Harm to all staff/Discuss with provider need for PT consult/Monitor gait and stability/Provide patient with walking aids - walker, cane, crutches/Reinforce activity limits and safety measures with patient and family/Tailored Fall Risk Interventions/Visual Cue: Yellow wristband and red socks/Bed in lowest position, wheels locked, appropriate side rails in place/Call bell, personal items and telephone in reach/Instruct patient to call for assistance before getting out of bed or chair/Non-slip footwear when patient is out of bed/[place] to call system/Physically safe environment - no spills, clutter or unnecessary equipment/Purposeful Proactive Rounding/Room/bathroom lighting operational, light cord in reach

## 2023-09-01 NOTE — CONSULT NOTE ADULT - SUBJECTIVE AND OBJECTIVE BOX
Patient is a 78y old  Female with PMH of transverse colon neoplasm s/p colectomy in [facility] in early August, HTN, HLD, CAD s/p LHC with TALI to RCA, Atrial fibrillation s/p watchman implant (3/23), hypothyroidism, PUJA (not on cpap), TIA, now presents to the ER for evaluation of weak, tired, lightheaded and low blood pressure, in the low 80s. Patient reports that since her surgery earlier in August she is not eating much at all and  has had diarrhea. She used to have 5-6 episodes of loose watery bowel movements every day and now have improved to 2-3 bowel movements daily. She has no fever or chills. ON Admission, she found to have no fever but Low Blood pressure, BP of 93/62. The stool studies are in process. The ID consult requested to assist with further evaluation and antibiotic management.         REVIEW OF SYSTEMS: Total of twelve systems have been reviewed with patient and found to be negative unless mentioned in HPI        PAST MEDICAL & SURGICAL HISTORY:  Atrial fibrillation  Hyperlipidemia  Hypertension  CAD (coronary artery disease)  Colon cancer  Hypothyroidism  Obstructive sleep apnea  Transient ischemic attack (TIA)  Atrial fibrillation  H/O colectomy        SOCIAL HISTORY  Alcohol: Does not drink  Tobacco: Does not smoke  Illicit substance use: None      FAMILY HISTORY: Non contributory to the present illness      ALLERGIES: No Known Allergies      Vital Signs Last 24 Hrs  T(C): 36.5 (01 Sep 2023 12:26), Max: 37.2 (01 Sep 2023 00:30)  T(F): 97.7 (01 Sep 2023 12:26), Max: 99 (01 Sep 2023 00:30)  HR: 82 (01 Sep 2023 12:26) (78 - 90)  BP: 106/70 (01 Sep 2023 12:26) (93/62 - 109/50)  BP(mean): --  RR: 18 (01 Sep 2023 12:26) (18 - 19)  SpO2: 96% (01 Sep 2023 12:26) (95% - 98%)    Parameters below as of 01 Sep 2023 12:26  Patient On (Oxygen Delivery Method): room air  O2 Flow (L/min): 1        PHYSICAL EXAM:  GENERAL: Not in distress   CHEST/LUNG: Not using accessory muscles  HEART: s1 and s2 present  ABDOMEN:  Nontender and  Nondistended  EXTREMITIES: No pedal  edema  CNS: Awake and Alert      LABS:                        10.6   7.36  )-----------( 495      ( 01 Sep 2023 08:24 )             32.3       09-01    137  |  106  |  35<H>  ----------------------------<  102<H>  3.5   |  22  |  0.79    Ca    7.4<L>      01 Sep 2023 08:24  Phos  2.8     09-01  Mg     1.6     09-01    TPro  6.0  /  Alb  2.1<L>  /  TBili  0.4  /  DBili  x   /  AST  8<L>  /  ALT  23  /  AlkPhos  103  08-31        Urinalysis Basic - ( 01 Sep 2023 08:24 )  Color: x / Appearance: x / SG: x / pH: x  Gluc: 102 mg/dL / Ketone: x  / Bili: x / Urobili: x   Blood: x / Protein: x / Nitrite: x   Leuk Esterase: x / RBC: x / WBC x   Sq Epi: x / Non Sq Epi: x / Bacteria: x        MEDICATIONS  (STANDING):  apixaban 5 milliGRAM(s) Oral every 12 hours  aspirin  chewable 81 milliGRAM(s) Oral daily  atorvastatin 20 milliGRAM(s) Oral at bedtime  gabapentin 100 milliGRAM(s) Oral every 8 hours  lactated ringers. 1000 milliLiter(s) (80 mL/Hr) IV Continuous <Continuous>  levETIRAcetam 750 milliGRAM(s) Oral two times a day  levothyroxine 88 MICROGram(s) Oral daily  melatonin 5 milliGRAM(s) Oral at bedtime  oxybutynin 10 milliGRAM(s) Oral daily  pantoprazole    Tablet 40 milliGRAM(s) Oral before breakfast  venlafaxine XR. 75 milliGRAM(s) Oral daily    MEDICATIONS  (PRN):  acetaminophen     Tablet .. 650 milliGRAM(s) Oral every 6 hours PRN Temp greater or equal to 38C (100.4F), Mild Pain (1 - 3)  albuterol    90 MICROgram(s) HFA Inhaler 2 Puff(s) Inhalation every 6 hours PRN Shortness of Breath and/or Wheezing  ALPRAZolam 0.25 milliGRAM(s) Oral two times a day PRN Anxiety/insomnia  ondansetron Injectable 4 milliGRAM(s) IV Push every 8 hours PRN Nausea and/or Vomiting        RADIOLOGY & ADDITIONAL TESTS:    8/31/23 : Xray Chest 1 View- PORTABLE-Urgent (Xray Chest 1 View- PORTABLE-Urgent .) (08.31.23 @ 23:17) >    No acute pulmonary disease.        MICROBIOLOGY DATA:    GI PCR Panel Stool (09.01.23 @ 03:30)   GI PCR Panel: NotDetec:     Urine Microscopic-Add On (NC) (09.01.23 @ 03:10)   Red Blood Cell - Urine: 0 /HPF  White Blood Cell - Urine: 4 /HPF  Bacteria: Many /HPF  Comment - Urine: few amorphous urates  Squamous Epithelial Cells: PresentRapid HIV-1/2 Antibody (08.31.23 @ 21:30)   Rapid HIV-1/2 Antibody: Nonreact:             Patient is a 78y old  Female with PMH of transverse colon neoplasm s/p colectomy in [facility] in early August, HTN, HLD, CAD s/p LHC with TALI to RCA, Atrial fibrillation s/p watchman implant (3/23), hypothyroidism, PUJA (not on cpap), TIA, now presents to the ER for evaluation of weak, tired, lightheaded and low blood pressure, in the low 80s. Patient reports that since her surgery earlier in August she is not eating much at all and  has had diarrhea. She used to have 5-6 episodes of loose watery bowel movements every day and now have improved to 2-3 bowel movements daily. She has no fever or chills. ON Admission, she found to have no fever but Low Blood pressure, BP of 93/62. The stool studies are in process. The ID consult requested to assist with further evaluation and antibiotic management.         REVIEW OF SYSTEMS: Total of twelve systems have been reviewed with patient and found to be negative unless mentioned in HPI        PAST MEDICAL & SURGICAL HISTORY:  Atrial fibrillation  Hyperlipidemia  Hypertension  CAD (coronary artery disease)  Colon cancer  Hypothyroidism  Obstructive sleep apnea  Transient ischemic attack (TIA)  Atrial fibrillation  H/O colectomy        SOCIAL HISTORY  Alcohol: Does not drink  Tobacco: Does not smoke  Illicit substance use: None      FAMILY HISTORY: Non contributory to the present illness      ALLERGIES: No Known Allergies      Vital Signs Last 24 Hrs  T(C): 36.5 (01 Sep 2023 12:26), Max: 37.2 (01 Sep 2023 00:30)  T(F): 97.7 (01 Sep 2023 12:26), Max: 99 (01 Sep 2023 00:30)  HR: 82 (01 Sep 2023 12:26) (78 - 90)  BP: 106/70 (01 Sep 2023 12:26) (93/62 - 109/50)  BP(mean): --  RR: 18 (01 Sep 2023 12:26) (18 - 19)  SpO2: 96% (01 Sep 2023 12:26) (95% - 98%)    Parameters below as of 01 Sep 2023 12:26  Patient On (Oxygen Delivery Method): room air  O2 Flow (L/min): 1        PHYSICAL EXAM:  GENERAL: Not in distress   CHEST/LUNG: Not using accessory muscles  HEART: s1 and s2 present  ABDOMEN:  Nontender and  Nondistended  EXTREMITIES: No pedal  edema  CNS: Awake and Alert      LABS:                        10.6   7.36  )-----------( 495      ( 01 Sep 2023 08:24 )             32.3       09-01    137  |  106  |  35<H>  ----------------------------<  102<H>  3.5   |  22  |  0.79    Ca    7.4<L>      01 Sep 2023 08:24  Phos  2.8     09-01  Mg     1.6     09-01    TPro  6.0  /  Alb  2.1<L>  /  TBili  0.4  /  DBili  x   /  AST  8<L>  /  ALT  23  /  AlkPhos  103  08-31        Urinalysis Basic - ( 01 Sep 2023 08:24 )  Color: x / Appearance: x / SG: x / pH: x  Gluc: 102 mg/dL / Ketone: x  / Bili: x / Urobili: x   Blood: x / Protein: x / Nitrite: x   Leuk Esterase: x / RBC: x / WBC x   Sq Epi: x / Non Sq Epi: x / Bacteria: x        MEDICATIONS  (STANDING):  apixaban 5 milliGRAM(s) Oral every 12 hours  aspirin  chewable 81 milliGRAM(s) Oral daily  atorvastatin 20 milliGRAM(s) Oral at bedtime  gabapentin 100 milliGRAM(s) Oral every 8 hours  lactated ringers. 1000 milliLiter(s) (80 mL/Hr) IV Continuous <Continuous>  levETIRAcetam 750 milliGRAM(s) Oral two times a day  levothyroxine 88 MICROGram(s) Oral daily  melatonin 5 milliGRAM(s) Oral at bedtime  oxybutynin 10 milliGRAM(s) Oral daily  pantoprazole    Tablet 40 milliGRAM(s) Oral before breakfast  venlafaxine XR. 75 milliGRAM(s) Oral daily    MEDICATIONS  (PRN):  acetaminophen     Tablet .. 650 milliGRAM(s) Oral every 6 hours PRN Temp greater or equal to 38C (100.4F), Mild Pain (1 - 3)  albuterol    90 MICROgram(s) HFA Inhaler 2 Puff(s) Inhalation every 6 hours PRN Shortness of Breath and/or Wheezing  ALPRAZolam 0.25 milliGRAM(s) Oral two times a day PRN Anxiety/insomnia  ondansetron Injectable 4 milliGRAM(s) IV Push every 8 hours PRN Nausea and/or Vomiting        RADIOLOGY & ADDITIONAL TESTS:    8/31/23 : Xray Chest 1 View- PORTABLE-Urgent (Xray Chest 1 View- PORTABLE-Urgent .) (08.31.23 @ 23:17) >    No acute pulmonary disease.        MICROBIOLOGY DATA:    GI PCR Panel Stool (09.01.23 @ 03:30)   GI PCR Panel: NotDetec:     Urine Microscopic-Add On (NC) (09.01.23 @ 03:10)   Red Blood Cell - Urine: 0 /HPF  White Blood Cell - Urine: 4 /HPF  Bacteria: Many /HPF  Comment - Urine: few amorphous urates  Squamous Epithelial Cells: PresentRapid HIV-1/2 Antibody (08.31.23 @ 21:30)   Rapid HIV-1/2 Antibody: Nonreact:             Patient is a 78y old  Female with PMH of transverse colon neoplasm s/p colectomy in [facility] in early August, HTN, HLD, CAD s/p LHC with TALI to RCA, Atrial fibrillation s/p watchman implant (3/23), hypothyroidism, PUJA (not on cpap), TIA, now presents to the ER for evaluation of weak, tired, lightheaded and low blood pressure, in the low 80s. Patient reports that since her surgery earlier in August she is not eating much at all and  has had diarrhea. She used to have 5-6 episodes of loose watery bowel movements every day and now have improved to 2-3 bowel movements daily. She has no fever or chills. ON Admission, she found to have no fever but Low Blood pressure, BP of 93/62. The stool studies are in process. The ID consult requested to assist with further evaluation and antibiotic management.         REVIEW OF SYSTEMS: Total of twelve systems have been reviewed with patient and found to be negative unless mentioned in HPI        PAST MEDICAL & SURGICAL HISTORY:  Atrial fibrillation  Hyperlipidemia  Hypertension  CAD (coronary artery disease)  Colon cancer  Hypothyroidism  Obstructive sleep apnea  Transient ischemic attack (TIA)  Atrial fibrillation  H/O colectomy        SOCIAL HISTORY  Alcohol: Does not drink  Tobacco: Does not smoke  Illicit substance use: None      FAMILY HISTORY: Non contributory to the present illness      ALLERGIES: No Known Allergies      Vital Signs Last 24 Hrs  T(C): 36.5 (01 Sep 2023 12:26), Max: 37.2 (01 Sep 2023 00:30)  T(F): 97.7 (01 Sep 2023 12:26), Max: 99 (01 Sep 2023 00:30)  HR: 82 (01 Sep 2023 12:26) (78 - 90)  BP: 106/70 (01 Sep 2023 12:26) (93/62 - 109/50)  BP(mean): --  RR: 18 (01 Sep 2023 12:26) (18 - 19)  SpO2: 96% (01 Sep 2023 12:26) (95% - 98%)    Parameters below as of 01 Sep 2023 12:26  Patient On (Oxygen Delivery Method): room air  O2 Flow (L/min): 1        PHYSICAL EXAM:  GENERAL: Not in distress   CHEST/LUNG: Not using accessory muscles  HEART: s1 and s2 present  ABDOMEN:  Nontender and  Nondistended  EXTREMITIES: No pedal  edema  CNS: Awake and Alert      LABS:                        10.6   7.36  )-----------( 495      ( 01 Sep 2023 08:24 )             32.3       09-01    137  |  106  |  35<H>  ----------------------------<  102<H>  3.5   |  22  |  0.79    Ca    7.4<L>      01 Sep 2023 08:24  Phos  2.8     09-01  Mg     1.6     09-01    TPro  6.0  /  Alb  2.1<L>  /  TBili  0.4  /  DBili  x   /  AST  8<L>  /  ALT  23  /  AlkPhos  103  08-31        Urinalysis Basic - ( 01 Sep 2023 08:24 )  Color: x / Appearance: x / SG: x / pH: x  Gluc: 102 mg/dL / Ketone: x  / Bili: x / Urobili: x   Blood: x / Protein: x / Nitrite: x   Leuk Esterase: x / RBC: x / WBC x   Sq Epi: x / Non Sq Epi: x / Bacteria: x        MEDICATIONS  (STANDING):  apixaban 5 milliGRAM(s) Oral every 12 hours  aspirin  chewable 81 milliGRAM(s) Oral daily  atorvastatin 20 milliGRAM(s) Oral at bedtime  gabapentin 100 milliGRAM(s) Oral every 8 hours  lactated ringers. 1000 milliLiter(s) (80 mL/Hr) IV Continuous <Continuous>  levETIRAcetam 750 milliGRAM(s) Oral two times a day  levothyroxine 88 MICROGram(s) Oral daily  melatonin 5 milliGRAM(s) Oral at bedtime  oxybutynin 10 milliGRAM(s) Oral daily  pantoprazole    Tablet 40 milliGRAM(s) Oral before breakfast  venlafaxine XR. 75 milliGRAM(s) Oral daily    MEDICATIONS  (PRN):  acetaminophen     Tablet .. 650 milliGRAM(s) Oral every 6 hours PRN Temp greater or equal to 38C (100.4F), Mild Pain (1 - 3)  albuterol    90 MICROgram(s) HFA Inhaler 2 Puff(s) Inhalation every 6 hours PRN Shortness of Breath and/or Wheezing  ALPRAZolam 0.25 milliGRAM(s) Oral two times a day PRN Anxiety/insomnia  ondansetron Injectable 4 milliGRAM(s) IV Push every 8 hours PRN Nausea and/or Vomiting        RADIOLOGY & ADDITIONAL TESTS:    8/31/23 : Xray Chest 1 View- PORTABLE-Urgent (Xray Chest 1 View- PORTABLE-Urgent .) (08.31.23 @ 23:17) >    No acute pulmonary disease.        MICROBIOLOGY DATA:    GI PCR Panel Stool (09.01.23 @ 03:30)   GI PCR Panel: NotDetec:     Urine Microscopic-Add On (NC) (09.01.23 @ 03:10)   Red Blood Cell - Urine: 0 /HPF  White Blood Cell - Urine: 4 /HPF  Bacteria: Many /HPF  Comment - Urine: few amorphous urates  Squamous Epithelial Cells: PresentRapid HIV-1/2 Antibody (08.31.23 @ 21:30)   Rapid HIV-1/2 Antibody: Nonreact:             Patient is a 78y old  Female with PMH of transverse colon neoplasm s/p colectomy in [facility] in early August, HTN, HLD, CAD s/p LHC with TALI to RCA, Atrial fibrillation s/p watchman implant (3/23), hypothyroidism, PUJA (not on cpap), TIA, now presents to the ER for evaluation of weak, tired, lightheaded and low blood pressure, in the low 80s. Patient reports that since her surgery earlier in August she is not eating much at all and  has had diarrhea. She used to have 5-6 episodes of loose watery bowel movements every day and now have improved to 2-3 bowel movements daily. She has no fever or chills. ON Admission, she found to have no fever but Low Blood pressure, BP of 93/62. The stool studies are in process. The ID consult requested to assist with further evaluation and antibiotic management.       REVIEW OF SYSTEMS: Total of twelve systems have been reviewed with patient and found to be negative unless mentioned in HPI      PAST MEDICAL & SURGICAL HISTORY:  Atrial fibrillation  Hyperlipidemia  Hypertension  CAD (coronary artery disease)  Colon cancer  Hypothyroidism  Obstructive sleep apnea  Transient ischemic attack (TIA)  Atrial fibrillation  H/O colectomy      SOCIAL HISTORY  Alcohol: Does not drink  Tobacco: Does not smoke  Illicit substance use: None      FAMILY HISTORY: Non contributory to the present illness      ALLERGIES: No Known Allergies      Vital Signs Last 24 Hrs  T(C): 36.5 (01 Sep 2023 12:26), Max: 37.2 (01 Sep 2023 00:30)  T(F): 97.7 (01 Sep 2023 12:26), Max: 99 (01 Sep 2023 00:30)  HR: 82 (01 Sep 2023 12:26) (78 - 90)  BP: 106/70 (01 Sep 2023 12:26) (93/62 - 109/50)  BP(mean): --  RR: 18 (01 Sep 2023 12:26) (18 - 19)  SpO2: 96% (01 Sep 2023 12:26) (95% - 98%)    Parameters below as of 01 Sep 2023 12:26  Patient On (Oxygen Delivery Method): room air  O2 Flow (L/min): 1        PHYSICAL EXAM:  GENERAL: Not in distress   CHEST/LUNG: Not using accessory muscles  HEART: s1 and s2 present  ABDOMEN:  Nontender and incision site healed  EXTREMITIES: No pedal  edema  CNS: Awake and Alert      LABS:                        10.6   7.36  )-----------( 495      ( 01 Sep 2023 08:24 )             32.3       09-01    137  |  106  |  35<H>  ----------------------------<  102<H>  3.5   |  22  |  0.79    Ca    7.4<L>      01 Sep 2023 08:24  Phos  2.8     09-01  Mg     1.6     09-01    TPro  6.0  /  Alb  2.1<L>  /  TBili  0.4  /  DBili  x   /  AST  8<L>  /  ALT  23  /  AlkPhos  103  08-31        Urinalysis Basic - ( 01 Sep 2023 08:24 )  Color: x / Appearance: x / SG: x / pH: x  Gluc: 102 mg/dL / Ketone: x  / Bili: x / Urobili: x   Blood: x / Protein: x / Nitrite: x   Leuk Esterase: x / RBC: x / WBC x   Sq Epi: x / Non Sq Epi: x / Bacteria: x        MEDICATIONS  (STANDING):  apixaban 5 milliGRAM(s) Oral every 12 hours  aspirin  chewable 81 milliGRAM(s) Oral daily  atorvastatin 20 milliGRAM(s) Oral at bedtime  gabapentin 100 milliGRAM(s) Oral every 8 hours  lactated ringers. 1000 milliLiter(s) (80 mL/Hr) IV Continuous <Continuous>  levETIRAcetam 750 milliGRAM(s) Oral two times a day  levothyroxine 88 MICROGram(s) Oral daily  melatonin 5 milliGRAM(s) Oral at bedtime  oxybutynin 10 milliGRAM(s) Oral daily  pantoprazole    Tablet 40 milliGRAM(s) Oral before breakfast  venlafaxine XR. 75 milliGRAM(s) Oral daily    MEDICATIONS  (PRN):  acetaminophen     Tablet .. 650 milliGRAM(s) Oral every 6 hours PRN Temp greater or equal to 38C (100.4F), Mild Pain (1 - 3)  albuterol    90 MICROgram(s) HFA Inhaler 2 Puff(s) Inhalation every 6 hours PRN Shortness of Breath and/or Wheezing  ALPRAZolam 0.25 milliGRAM(s) Oral two times a day PRN Anxiety/insomnia  ondansetron Injectable 4 milliGRAM(s) IV Push every 8 hours PRN Nausea and/or Vomiting        RADIOLOGY & ADDITIONAL TESTS:    8/31/23 : Xray Chest 1 View- PORTABLE-Urgent (Xray Chest 1 View- PORTABLE-Urgent .) (08.31.23 @ 23:17) >    No acute pulmonary disease.        MICROBIOLOGY DATA:    GI PCR Panel Stool (09.01.23 @ 03:30)   GI PCR Panel: NotDetec:     Urine Microscopic-Add On (NC) (09.01.23 @ 03:10)   Red Blood Cell - Urine: 0 /HPF  White Blood Cell - Urine: 4 /HPF  Bacteria: Many /HPF  Comment - Urine: few amorphous urates  Squamous Epithelial Cells: PresentRapid HIV-1/2 Antibody (08.31.23 @ 21:30)   Rapid HIV-1/2 Antibody: Nonreact:             Patient is a 78y old  Female with PMH of transverse colon neoplasm s/p colectomy in [facility] in early August, HTN, HLD, CAD s/p LHC with TALI to RCA, Atrial fibrillation s/p watchman implant (3/23), hypothyroidism, PUJA (not on cpap), TIA, now presents to the ER for evaluation of weak, tired, lightheaded and low blood pressure, in the low 80s. Patient reports that since her surgery earlier in August she is not eating much at all and  has had diarrhea. She used to have 5-6 episodes of loose watery bowel movements every day and now have improved to 2-3 bowel movements daily. She has no fever or chills. ON Admission, she found to have no fever but Low Blood pressure, BP of 93/62. The stool studies are in process. The ID consult requested to assist with further evaluation and antibiotic management.       REVIEW OF SYSTEMS: Total of twelve systems have been reviewed with patient and found to be negative unless mentioned in HPI      PAST MEDICAL & SURGICAL HISTORY:  Atrial fibrillation  Hyperlipidemia  Hypertension  CAD (coronary artery disease)  Colon cancer  Hypothyroidism  Obstructive sleep apnea  Transient ischemic attack (TIA)  Atrial fibrillation  H/O colectomy      SOCIAL HISTORY  Alcohol: Does not drink  Tobacco: Does not smoke  Illicit substance use: None      FAMILY HISTORY: Non contributory to the present illness      ALLERGIES: No Known Allergies      Vital Signs Last 24 Hrs  T(C): 36.5 (01 Sep 2023 12:26), Max: 37.2 (01 Sep 2023 00:30)  T(F): 97.7 (01 Sep 2023 12:26), Max: 99 (01 Sep 2023 00:30)  HR: 82 (01 Sep 2023 12:26) (78 - 90)  BP: 106/70 (01 Sep 2023 12:26) (93/62 - 109/50)  BP(mean): --  RR: 18 (01 Sep 2023 12:26) (18 - 19)  SpO2: 96% (01 Sep 2023 12:26) (95% - 98%)    Parameters below as of 01 Sep 2023 12:26  Patient On (Oxygen Delivery Method): room air  O2 Flow (L/min): 1        PHYSICAL EXAM:  GENERAL: Not in distress   CHEST/LUNG: Not using accessory muscles  HEART: s1 and s2 present  ABDOMEN:  Nontender and incision site healed  EXTREMITIES: No pedal  edema  CNS: Awake and Alert      LABS:                        10.6   7.36  )-----------( 495      ( 01 Sep 2023 08:24 )             32.3       09-01    137  |  106  |  35<H>  ----------------------------<  102<H>  3.5   |  22  |  0.79    Ca    7.4<L>      01 Sep 2023 08:24  Phos  2.8     09-01  Mg     1.6     09-01    TPro  6.0  /  Alb  2.1<L>  /  TBili  0.4  /  DBili  x   /  AST  8<L>  /  ALT  23  /  AlkPhos  103  08-31        Urinalysis Basic - ( 01 Sep 2023 08:24 )  Color: x / Appearance: x / SG: x / pH: x  Gluc: 102 mg/dL / Ketone: x  / Bili: x / Urobili: x   Blood: x / Protein: x / Nitrite: x   Leuk Esterase: x / RBC: x / WBC x   Sq Epi: x / Non Sq Epi: x / Bacteria: x        MEDICATIONS  (STANDING):  apixaban 5 milliGRAM(s) Oral every 12 hours  aspirin  chewable 81 milliGRAM(s) Oral daily  atorvastatin 20 milliGRAM(s) Oral at bedtime  gabapentin 100 milliGRAM(s) Oral every 8 hours  lactated ringers. 1000 milliLiter(s) (80 mL/Hr) IV Continuous <Continuous>  levETIRAcetam 750 milliGRAM(s) Oral two times a day  levothyroxine 88 MICROGram(s) Oral daily  melatonin 5 milliGRAM(s) Oral at bedtime  oxybutynin 10 milliGRAM(s) Oral daily  pantoprazole    Tablet 40 milliGRAM(s) Oral before breakfast  venlafaxine XR. 75 milliGRAM(s) Oral daily    MEDICATIONS  (PRN):  acetaminophen     Tablet .. 650 milliGRAM(s) Oral every 6 hours PRN Temp greater or equal to 38C (100.4F), Mild Pain (1 - 3)  albuterol    90 MICROgram(s) HFA Inhaler 2 Puff(s) Inhalation every 6 hours PRN Shortness of Breath and/or Wheezing  ALPRAZolam 0.25 milliGRAM(s) Oral two times a day PRN Anxiety/insomnia  ondansetron Injectable 4 milliGRAM(s) IV Push every 8 hours PRN Nausea and/or Vomiting        RADIOLOGY & ADDITIONAL TESTS:    8/31/23 : Xray Chest 1 View- PORTABLE-Urgent (Xray Chest 1 View- PORTABLE-Urgent .) (08.31.23 @ 23:17) >    No acute pulmonary disease.        MICROBIOLOGY DATA:    GI PCR Panel Stool (09.01.23 @ 03:30)   GI PCR Panel: NotDetec:     Urine Microscopic-Add On (NC) (09.01.23 @ 03:10)   Red Blood Cell - Urine: 0 /HPF  White Blood Cell - Urine: 4 /HPF  Bacteria: Many /HPF  Comment - Urine: few amorphous urates  Squamous Epithelial Cells: PresentRapid HIV-1/2 Antibody (08.31.23 @ 21:30)   Rapid HIV-1/2 Antibody: Nonreact:             Patient is a 78y old  Female with PMH of transverse colon neoplasm s/p colectomy in [facility] in early August, HTN, HLD, CAD s/p LHC with TALI to RCA, Atrial fibrillation s/p watchman implant (3/23), hypothyroidism, PUJA (not on cpap), TIA, now presents to the ER for evaluation of weak, tired, lightheaded and low blood pressure, in the low 80s. Patient reports that since her surgery earlier in August she is not eating much at all and  has had diarrhea. She used to have 5-6 episodes of loose watery bowel movements every day and now have improved to 2-3 bowel movements daily. She has no fever or chills. ON Admission, she found to have no fever but Low Blood pressure, BP of 93/62. The stool studies are in process. The ID consult requested to assist with further evaluation and antibiotic management.       REVIEW OF SYSTEMS: Total of twelve systems have been reviewed with patient and found to be negative unless mentioned in HPI      PAST MEDICAL & SURGICAL HISTORY:  Atrial fibrillation  Hyperlipidemia  Hypertension  CAD (coronary artery disease)  Colon cancer  Hypothyroidism  Obstructive sleep apnea  Transient ischemic attack (TIA)  Atrial fibrillation  H/O colectomy      SOCIAL HISTORY  Alcohol: Does not drink  Tobacco: Does not smoke  Illicit substance use: None      FAMILY HISTORY: Non contributory to the present illness      ALLERGIES: No Known Allergies      Vital Signs Last 24 Hrs  T(C): 36.5 (01 Sep 2023 12:26), Max: 37.2 (01 Sep 2023 00:30)  T(F): 97.7 (01 Sep 2023 12:26), Max: 99 (01 Sep 2023 00:30)  HR: 82 (01 Sep 2023 12:26) (78 - 90)  BP: 106/70 (01 Sep 2023 12:26) (93/62 - 109/50)  BP(mean): --  RR: 18 (01 Sep 2023 12:26) (18 - 19)  SpO2: 96% (01 Sep 2023 12:26) (95% - 98%)    Parameters below as of 01 Sep 2023 12:26  Patient On (Oxygen Delivery Method): room air  O2 Flow (L/min): 1        PHYSICAL EXAM:  GENERAL: Not in distress   CHEST/LUNG: Not using accessory muscles  HEART: s1 and s2 present  ABDOMEN:  Nontender and incision site healed  EXTREMITIES: No pedal  edema  CNS: Awake and Alert      LABS:                        10.6   7.36  )-----------( 495      ( 01 Sep 2023 08:24 )             32.3       09-01    137  |  106  |  35<H>  ----------------------------<  102<H>  3.5   |  22  |  0.79    Ca    7.4<L>      01 Sep 2023 08:24  Phos  2.8     09-01  Mg     1.6     09-01    TPro  6.0  /  Alb  2.1<L>  /  TBili  0.4  /  DBili  x   /  AST  8<L>  /  ALT  23  /  AlkPhos  103  08-31        Urinalysis Basic - ( 01 Sep 2023 08:24 )  Color: x / Appearance: x / SG: x / pH: x  Gluc: 102 mg/dL / Ketone: x  / Bili: x / Urobili: x   Blood: x / Protein: x / Nitrite: x   Leuk Esterase: x / RBC: x / WBC x   Sq Epi: x / Non Sq Epi: x / Bacteria: x        MEDICATIONS  (STANDING):  apixaban 5 milliGRAM(s) Oral every 12 hours  aspirin  chewable 81 milliGRAM(s) Oral daily  atorvastatin 20 milliGRAM(s) Oral at bedtime  gabapentin 100 milliGRAM(s) Oral every 8 hours  lactated ringers. 1000 milliLiter(s) (80 mL/Hr) IV Continuous <Continuous>  levETIRAcetam 750 milliGRAM(s) Oral two times a day  levothyroxine 88 MICROGram(s) Oral daily  melatonin 5 milliGRAM(s) Oral at bedtime  oxybutynin 10 milliGRAM(s) Oral daily  pantoprazole    Tablet 40 milliGRAM(s) Oral before breakfast  venlafaxine XR. 75 milliGRAM(s) Oral daily    MEDICATIONS  (PRN):  acetaminophen     Tablet .. 650 milliGRAM(s) Oral every 6 hours PRN Temp greater or equal to 38C (100.4F), Mild Pain (1 - 3)  albuterol    90 MICROgram(s) HFA Inhaler 2 Puff(s) Inhalation every 6 hours PRN Shortness of Breath and/or Wheezing  ALPRAZolam 0.25 milliGRAM(s) Oral two times a day PRN Anxiety/insomnia  ondansetron Injectable 4 milliGRAM(s) IV Push every 8 hours PRN Nausea and/or Vomiting        RADIOLOGY & ADDITIONAL TESTS:    8/31/23 : Xray Chest 1 View- PORTABLE-Urgent (Xray Chest 1 View- PORTABLE-Urgent .) (08.31.23 @ 23:17) >    No acute pulmonary disease.        MICROBIOLOGY DATA:    GI PCR Panel Stool (09.01.23 @ 03:30)   GI PCR Panel: NotDetec:     Urine Microscopic-Add On (NC) (09.01.23 @ 03:10)   Red Blood Cell - Urine: 0 /HPF  White Blood Cell - Urine: 4 /HPF  Bacteria: Many /HPF  Comment - Urine: few amorphous urates  Squamous Epithelial Cells: PresentRapid HIV-1/2 Antibody (08.31.23 @ 21:30)   Rapid HIV-1/2 Antibody: Nonreact:

## 2023-09-01 NOTE — CHART NOTE - NSCHARTNOTEFT_GEN_A_CORE
The CT scan of abd/pelvis has been reviewed.      9/1/23: CT Abdomen and Pelvis w/ Oral Cont (09.01.23 @ 15:20) Status post colectomy with ileorectal anastomosis.    Fluid throughout nonobstructed bowel and rectum, consistent with enteritis.    Extensive subcutaneous emphysema in the anterior abdominal wall. While findings may be postoperative given the history of recent surgery, in the   appropriate clinical context, necrotizing infection may be considered. Careful clinical evaluation is therefore warranted.    These findings were discussed with Dr. JERED MORFIN [phone] at 9/1/2023 3:43 PM with readback.    # Enteritis  # Necrotizing infection     would recommend:    1. Follow up surgery recommendation  2. Start on Zosyn and Linezolid ( as antitoxin and MRSA coverage), Avoid combination Of Zosyn and IV Vancomycin since its very nephrotoxic     -d/w Covering Jered WAYNE, Surgery team, patient's family over the phone, patient and Dr. Briggs The CT scan of abd/pelvis has been reviewed.      9/1/23: CT Abdomen and Pelvis w/ Oral Cont (09.01.23 @ 15:20) Status post colectomy with ileorectal anastomosis.    Fluid throughout nonobstructed bowel and rectum, consistent with enteritis.    Extensive subcutaneous emphysema in the anterior abdominal wall. While findings may be postoperative given the history of recent surgery, in the   appropriate clinical context, necrotizing infection may be considered. Careful clinical evaluation is therefore warranted.    These findings were discussed with Dr. JERED MORFIN [phone] at 9/1/2023 3:43 PM with readback.    # Enteritis  # Necrotizing infection     would recommend:    1. Follow up surgery recommendation  2. Start on Zosyn and Linezolid ( as antitoxin and MRSA coverage), Avoid combination Of Zosyn and IV Vancomycin since its very nephrotoxic     -d/w Covering Jered WAYNE, Surgery team, patient's family over the phone, patient and Dr. Briggs The CT scan of abd/pelvis has been reviewed.      9/1/23: CT Abdomen and Pelvis w/ Oral Cont (09.01.23 @ 15:20) Status post colectomy with ileorectal anastomosis.    Fluid throughout nonobstructed bowel and rectum, consistent with enteritis.    Extensive subcutaneous emphysema in the anterior abdominal wall. While findings may be postoperative given the history of recent surgery, in the   appropriate clinical context, necrotizing infection may be considered. Careful clinical evaluation is therefore warranted.    These findings were discussed with Dr. JERED MORFIN [phone] at 9/1/2023 3:43 PM with readback.    # Enteritis  # Necrotizing infection     would recommend:    1. Follow up surgery recommendation  2. Start on Zosyn and Linezolid ( as antitoxin and MRSA coverage), Avoid combination Of Zosyn and IV Vancomycin since its very nephrotoxic     -d/w Covering Jered WAYNE, Surgery team, patient's family over the phone, patient and Dr. Briggs

## 2023-09-01 NOTE — CONSULT NOTE ADULT - NEGATIVE GENERAL SYMPTOMS
no fever/no chills
no fever/no chills/no sweating/no anorexia/no polyphagia/no polyuria/no polydipsia

## 2023-09-01 NOTE — CONSULT NOTE ADULT - NEGATIVE MUSCULOSKELETAL SYMPTOMS
no muscle cramps/no muscle weakness/no stiffness/no neck pain/no arm pain R/no back pain/no leg pain L/no leg pain R

## 2023-09-01 NOTE — H&P ADULT - ASSESSMENT
Patient is a 77 y/o F with PMH transverse colon neoplasm s/p colectomy in [facility] 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 Patient reports that since yesterday she feels weak and tired, lightheaded more than usual and feels lethargic. Patient reports when her blood pressure was checked in the facility it was in the low 80s. Patient is being admitted for further management and work up of hypotension likely 2/2 to poor oral intake and diarrhea.  Patient is a 77 y/o F with PMH transverse colon neoplasm s/p colectomy in [facility] 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 Patient reports that since yesterday she feels weak and tired, lightheaded more than usual and feels lethargic. Patient reports when her blood pressure was checked in the facility it was in the low 80s. Patient is being admitted for further management and work up of hypotension likely 2/2 to poor oral intake and diarrhea.  Patient is a 79 y/o F with PMH transverse colon neoplasm s/p colectomy in [facility] 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 Patient reports that since yesterday she feels weak and tired, lightheaded more than usual and feels lethargic. Patient reports when her blood pressure was checked in the facility it was in the low 80s. Patient is being admitted for further management and work up of hypotension likely 2/2 to poor oral intake and diarrhea.

## 2023-09-02 LAB
24R-OH-CALCIDIOL SERPL-MCNC: 23 NG/ML — LOW (ref 30–80)
ANION GAP SERPL CALC-SCNC: 9 MMOL/L — SIGNIFICANT CHANGE UP (ref 5–17)
BASOPHILS # BLD AUTO: 0.09 K/UL — SIGNIFICANT CHANGE UP (ref 0–0.2)
BASOPHILS NFR BLD AUTO: 1 % — SIGNIFICANT CHANGE UP (ref 0–2)
BUN SERPL-MCNC: 27 MG/DL — HIGH (ref 7–18)
C DIFF BY PCR RESULT: SIGNIFICANT CHANGE UP
CALCIUM SERPL-MCNC: 8.1 MG/DL — LOW (ref 8.4–10.5)
CHLORIDE SERPL-SCNC: 106 MMOL/L — SIGNIFICANT CHANGE UP (ref 96–108)
CHOLEST SERPL-MCNC: 87 MG/DL — SIGNIFICANT CHANGE UP
CO2 SERPL-SCNC: 22 MMOL/L — SIGNIFICANT CHANGE UP (ref 22–31)
CREAT SERPL-MCNC: 0.74 MG/DL — SIGNIFICANT CHANGE UP (ref 0.5–1.3)
CULTURE RESULTS: SIGNIFICANT CHANGE UP
EGFR: 82 ML/MIN/1.73M2 — SIGNIFICANT CHANGE UP
EOSINOPHIL # BLD AUTO: 0.04 K/UL — SIGNIFICANT CHANGE UP (ref 0–0.5)
EOSINOPHIL NFR BLD AUTO: 0.4 % — SIGNIFICANT CHANGE UP (ref 0–6)
FERRITIN SERPL-MCNC: 72 NG/ML — SIGNIFICANT CHANGE UP (ref 13–330)
GLUCOSE SERPL-MCNC: 121 MG/DL — HIGH (ref 70–99)
HCT VFR BLD CALC: 35.3 % — SIGNIFICANT CHANGE UP (ref 34.5–45)
HDLC SERPL-MCNC: 55 MG/DL — SIGNIFICANT CHANGE UP
HGB BLD-MCNC: 11.5 G/DL — SIGNIFICANT CHANGE UP (ref 11.5–15.5)
IMM GRANULOCYTES NFR BLD AUTO: 5.1 % — HIGH (ref 0–0.9)
IRON SATN MFR SERPL: 15 UG/DL — LOW (ref 40–150)
IRON SATN MFR SERPL: 5 % — LOW (ref 15–50)
LIPID PNL WITH DIRECT LDL SERPL: 17 MG/DL — SIGNIFICANT CHANGE UP
LYMPHOCYTES # BLD AUTO: 2.1 K/UL — SIGNIFICANT CHANGE UP (ref 1–3.3)
LYMPHOCYTES # BLD AUTO: 22.8 % — SIGNIFICANT CHANGE UP (ref 13–44)
MCHC RBC-ENTMCNC: 28.2 PG — SIGNIFICANT CHANGE UP (ref 27–34)
MCHC RBC-ENTMCNC: 32.6 GM/DL — SIGNIFICANT CHANGE UP (ref 32–36)
MCV RBC AUTO: 86.5 FL — SIGNIFICANT CHANGE UP (ref 80–100)
MONOCYTES # BLD AUTO: 1.31 K/UL — HIGH (ref 0–0.9)
MONOCYTES NFR BLD AUTO: 14.2 % — HIGH (ref 2–14)
NEUTROPHILS # BLD AUTO: 5.2 K/UL — SIGNIFICANT CHANGE UP (ref 1.8–7.4)
NEUTROPHILS NFR BLD AUTO: 56.5 % — SIGNIFICANT CHANGE UP (ref 43–77)
NON HDL CHOLESTEROL: 32 MG/DL — SIGNIFICANT CHANGE UP
NRBC # BLD: 0 /100 WBCS — SIGNIFICANT CHANGE UP (ref 0–0)
PLATELET # BLD AUTO: 524 K/UL — HIGH (ref 150–400)
POTASSIUM SERPL-MCNC: 3.5 MMOL/L — SIGNIFICANT CHANGE UP (ref 3.5–5.3)
POTASSIUM SERPL-SCNC: 3.5 MMOL/L — SIGNIFICANT CHANGE UP (ref 3.5–5.3)
RBC # BLD: 4.08 M/UL — SIGNIFICANT CHANGE UP (ref 3.8–5.2)
RBC # FLD: 15.6 % — HIGH (ref 10.3–14.5)
SODIUM SERPL-SCNC: 137 MMOL/L — SIGNIFICANT CHANGE UP (ref 135–145)
SPECIMEN SOURCE: SIGNIFICANT CHANGE UP
TIBC SERPL-MCNC: 298 UG/DL — SIGNIFICANT CHANGE UP (ref 250–450)
TRIGL SERPL-MCNC: 74 MG/DL — SIGNIFICANT CHANGE UP
TSH SERPL-MCNC: 0.84 UU/ML — SIGNIFICANT CHANGE UP (ref 0.34–4.82)
UIBC SERPL-MCNC: 283 UG/DL — SIGNIFICANT CHANGE UP (ref 110–370)
VIT B12 SERPL-MCNC: 885 PG/ML — SIGNIFICANT CHANGE UP (ref 232–1245)
WBC # BLD: 9.21 K/UL — SIGNIFICANT CHANGE UP (ref 3.8–10.5)
WBC # FLD AUTO: 9.21 K/UL — SIGNIFICANT CHANGE UP (ref 3.8–10.5)

## 2023-09-02 PROCEDURE — 74018 RADEX ABDOMEN 1 VIEW: CPT | Mod: 26

## 2023-09-02 PROCEDURE — 99231 SBSQ HOSP IP/OBS SF/LOW 25: CPT

## 2023-09-02 RX ORDER — SODIUM CHLORIDE 9 MG/ML
1000 INJECTION, SOLUTION INTRAVENOUS
Refills: 0 | Status: DISCONTINUED | OUTPATIENT
Start: 2023-09-02 | End: 2023-09-04

## 2023-09-02 RX ORDER — ONDANSETRON 8 MG/1
4 TABLET, FILM COATED ORAL EVERY 6 HOURS
Refills: 0 | Status: DISCONTINUED | OUTPATIENT
Start: 2023-09-02 | End: 2023-09-09

## 2023-09-02 RX ORDER — SODIUM CHLORIDE 9 MG/ML
1000 INJECTION INTRAMUSCULAR; INTRAVENOUS; SUBCUTANEOUS
Refills: 0 | Status: DISCONTINUED | OUTPATIENT
Start: 2023-09-02 | End: 2023-09-02

## 2023-09-02 RX ORDER — SIMETHICONE 80 MG/1
80 TABLET, CHEWABLE ORAL
Refills: 0 | Status: DISCONTINUED | OUTPATIENT
Start: 2023-09-02 | End: 2023-09-09

## 2023-09-02 RX ORDER — METOCLOPRAMIDE HCL 10 MG
10 TABLET ORAL EVERY 8 HOURS
Refills: 0 | Status: DISCONTINUED | OUTPATIENT
Start: 2023-09-02 | End: 2023-09-09

## 2023-09-02 RX ADMIN — LEVETIRACETAM 750 MILLIGRAM(S): 250 TABLET, FILM COATED ORAL at 17:11

## 2023-09-02 RX ADMIN — Medication 10 MILLIGRAM(S): at 22:27

## 2023-09-02 RX ADMIN — LINEZOLID 300 MILLIGRAM(S): 600 INJECTION, SOLUTION INTRAVENOUS at 11:07

## 2023-09-02 RX ADMIN — SODIUM CHLORIDE 80 MILLILITER(S): 9 INJECTION, SOLUTION INTRAVENOUS at 20:27

## 2023-09-02 RX ADMIN — SODIUM CHLORIDE 80 MILLILITER(S): 9 INJECTION, SOLUTION INTRAVENOUS at 16:31

## 2023-09-02 RX ADMIN — APIXABAN 5 MILLIGRAM(S): 2.5 TABLET, FILM COATED ORAL at 17:11

## 2023-09-02 RX ADMIN — SODIUM CHLORIDE 75 MILLILITER(S): 9 INJECTION INTRAMUSCULAR; INTRAVENOUS; SUBCUTANEOUS at 06:55

## 2023-09-02 RX ADMIN — ONDANSETRON 4 MILLIGRAM(S): 8 TABLET, FILM COATED ORAL at 20:30

## 2023-09-02 NOTE — PROGRESS NOTE ADULT - ASSESSMENT
1. Diarrhea in thi patient is most likely due to sub total colon resection  2. R/o infectious colitis  3. R/o C. Diff colitis  4. Anemia  5. No evidence of acute GI bleeding  6. H/o colon cancer    Suggestions:    1. Check stool for culture and C. Diff  2. Continue antibiotics  3. ID evaluation  4. Monitor electrolytes  5. IVF hydration  6. Diet as tolerated  7. Monitor H/H  8. Transfuse PRBC as needed  9. Check CEA  10. Oncology follow up  11. Avoid NSAID  12. Protonix daily  13. DVT prophylaxis

## 2023-09-02 NOTE — CHART NOTE - NSCHARTNOTEFT_GEN_A_CORE
Called to bedside for patient vomited after taking water with medications.    Pt  has been taking sips of water throughout day, about 120cc worth in past few hours.  She states this can happen at home and is not unusual.  Emesis contents were yellowish and water. No blood, no fecal material.  Pt with no abd bloating, nausea.  Pt. not interested in NG tube and absolutely will not agree to one, even if only temporary or as a trial.    S1, S2, rrr, no MGR  Lungs dim b/l but clear  +BS abd s/nt/nd s merlin/guarding  No LE edema.    -Will observe for now.  -Cont NPO, IVF with D5NS.    Jorge Luis Keith NP

## 2023-09-02 NOTE — PROGRESS NOTE ADULT - ASSESSMENT
77 y/o F with PMH transverse colon neoplasm s/p colectomy in [facility] 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 Patient reports that since yesterday she feels weak and tired,chronic diarrhea,hypotension, now ileus,UTI.  1.Hypotension-IVF.  2.Chronic diarrhea-CT abd and pelvis,as above, GI and surgical eval noted.  3.Hypothyroid-synthroid.  4.Echocardiogram.  5.CAD-asa,b blocker,statin.  6.Afib-eliquis.  7.UTI-ABX.  8.GI prophylaxis. 79 y/o F with PMH transverse colon neoplasm s/p colectomy in [facility] in early August, HTN, HLD, CAD s/p LHC with TLAI to RCA, Atrial fibrillation s/p watchman implant (3/23), hypothyroidism, PUJA (not on cpap), TIA. Patient reports that since yesterday she feels weak and tired,chronic diarrhea,hypotension, now ileus,UTI.  1.Hypotension-IVF.  2.Chronic diarrhea-CT abd and pelvis,as above, GI and surgical eval noted.  3.Hypothyroid-synthroid.  4.Echocardiogram.  5.CAD-asa,b blocker,statin.  6.Afib-eliquis.  7.UTI-ABX.  8.GI prophylaxis. 79 y/o F with PMH transverse colon neoplasm s/p colectomy in [facility] 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 Patient reports that since yesterday she feels weak and tired,chronic diarrhea,hypotension, now ileus,UTI.  1.Hypotension-IVF.  2.Chronic diarrhea-CT abd and pelvis,as above, GI and surgical eval noted.  3.Hypothyroid-synthroid.  4.Echocardiogram.  5.CAD-asa,b blocker,statin.  6.Afib-eliquis.  7.UTI-ABX.  8.GI prophylaxis.

## 2023-09-02 NOTE — PROGRESS NOTE ADULT - ASSESSMENT
Patient is a 78y old  Female with PMH of transverse colon neoplasm s/p colectomy in [facility] in early August, HTN, HLD, CAD s/p LHC with TALI to RCA, Atrial fibrillation s/p watchman implant (3/23), hypothyroidism, PUJA (not on cpap), TIA, now presents to the ER for evaluation of weak, tired, lightheaded and low blood pressure, in the low 80s. Patient reports that since her surgery earlier in August she is not eating much at all and  has had diarrhea. She used to have 5-6 episodes of loose watery bowel movements every day and now have improved to 2-3 bowel movements daily. She has no fever or chills. ON Admission, she found to have no fever but Low Blood pressure, BP of 93/62. The stool studies are in process. The ID consult requested to assist with further evaluation and antibiotic management.     # Hypotension  # R/O Infectious etiology of diarrhea- C.difficile PCR and GI pathogen PCR is negative   #Enteritis and Necrotizing  fasciitis - CT abd/pelvis shows " Extensive subcutaneous emphysema in the anterior abdominal wall. While findings may be postoperative given the history of recent surgery, in the appropriate clinical context, necrotizing infection may be considered."  - As per surgery " Extensive subcutaneous emphysema in the anterior abdominal wall most likely related to recent surgery, necrotizing fascitis very unlikely."     would recommend:    1. NPO, IVF with D5  2. Discontinue All Abx since Surgery recommended "  Extensive subcutaneous emphysema in the anterior abdominal wall most likely related to recent surgery, necrotizing fascitis very unlikely."   3. Monitor BP and IVF boluses as needed  4. Antiemetic agents     d/w DR. Briggs , patient and Over night covering Melecio WAYNE      Attending Attestation:    Spent more than 35 minutes on total encounter, more than 50 % of the visit was spent counseling and/or coordinating care by the Attending physician.   Patient is a 78y old  Female with PMH of transverse colon neoplasm s/p colectomy in [facility] in early August, HTN, HLD, CAD s/p LHC with TALI to RCA, Atrial fibrillation s/p watchman implant (3/23), hypothyroidism, PUJA (not on cpap), TIA, now presents to the ER for evaluation of weak, tired, lightheaded and low blood pressure, in the low 80s. Patient reports that since her surgery earlier in August she is not eating much at all and  has had diarrhea. She used to have 5-6 episodes of loose watery bowel movements every day and now have improved to 2-3 bowel movements daily. She has no fever or chills. ON Admission, she found to have no fever but Low Blood pressure, BP of 93/62. The stool studies are in process. The ID consult requested to assist with further evaluation and antibiotic management.     # Hypotension  # R/O Infectious etiology of diarrhea- C.difficile PCR and GI pathogen PCR is negative   #Enteritis and Necrotizing  fasciitis - CT abd/pelvis shows " Extensive subcutaneous emphysema in the anterior abdominal wall. While findings may be postoperative given the history of recent surgery, in the appropriate clinical context, necrotizing infection may be considered."  - As per surgery " Extensive subcutaneous emphysema in the anterior abdominal wall most likely related to recent surgery, necrotizing fascitis very unlikely."     would recommend:    1. NPO, IVF with D5  2. Discontinue All Abx since Surgery recommended "  Extensive subcutaneous emphysema in the anterior abdominal wall most likely related to recent surgery, necrotizing fascitis very unlikely."   3. Monitor BP and IVF boluses as needed  4. Antiemetic agents     d/w DR. Briggs , patient and Over night covering Melecio WAYNE      Attending Attestation:    Spent more than 35 minutes on total encounter, more than 50 % of the visit was spent counseling and/or coordinating care by the Attending physician.   Patient is a 78y old  Female with PMH of transverse colon neoplasm s/p colectomy in [facility] in early August, HTN, HLD, CAD s/p LHC with TALI to RCA, Atrial fibrillation s/p watchman implant (3/23), hypothyroidism, PUJA (not on cpap), TIA, now presents to the ER for evaluation of weak, tired, lightheaded and low blood pressure, in the low 80s. Patient reports that since her surgery earlier in August she is not eating much at all and  has had diarrhea. She used to have 5-6 episodes of loose watery bowel movements every day and now have improved to 2-3 bowel movements daily. She has no fever or chills. ON Admission, she found to have no fever but Low Blood pressure, BP of 93/62. The stool studies are in process. The ID consult requested to assist with further evaluation and antibiotic management.     # Hypotension  # R/O Infectious etiology of diarrhea- C.difficile PCR and GI pathogen PCR is negative   #Enteritis and Necrotizing  fasciitis - CT abd/pelvis shows " Extensive subcutaneous emphysema in the anterior abdominal wall. While findings may be postoperative given the history of recent surgery, in the appropriate clinical context, necrotizing infection may be considered."  - As per surgery " Extensive subcutaneous emphysema in the anterior abdominal wall most likely related to recent surgery, necrotizing fascitis very unlikely."     would recommend:    1. NPO, IVF with D5  2. Discontinue All Abx since Surgery recommended "  Extensive subcutaneous emphysema in the anterior abdominal wall most likely related to recent surgery, necrotizing fascitis very unlikely."   3. Monitor BP and IVF boluses as needed  4. Antiemetic agents     d/w DR. Briggs , patient and Over night covering Melecio WAYNE      Attending Attestation:    Spent more than 35 minutes on total encounter, more than 50 % of the visit was spent counseling and/or coordinating care by the Attending physician.

## 2023-09-02 NOTE — PROGRESS NOTE ADULT - SUBJECTIVE AND OBJECTIVE BOX
[   ] ICU                                          [   ] CCU                                      [ X  ] Medical Floor      Patient is a 78 year old female with persistent diarrhea. GI consulted to evaluate.         Patient is a 78 year old female with past medical history significant for transverse colon neoplasm s/p colectomy in [facility] in early August 2023, HTN, HLD, CAD s/p LHC with TALI to RCA, Atrial fibrillation s/p watchman implant (3/23), hypothyroidism, PUJA, and TIA presented to the emergency room with 2 days history of lightheaded and feels lethargic. Patient reports when her blood pressure was checked in the facility it was in the low 80s. Patient reports that since her surgery earlier in August she is not eating much at all. She reports that since the surgery she has had diarrhea. Patient reports she used to have 5-6 episodes of loose watery bowel movements every day and now have improved to 2-3 bowel movements daily. Patient reports due to fear of worsening diarrhea she has decreased diet and oral intake. Patient reports she only takes soup and anything heavy makes her diarrhea worse associated with intermittent, diffuse, non radiating, 3/10 intensity, crampy abdominal pain. . Patient denies nausea, vomiting, hematemesis, hematochezia, melena, fever, chills, chest pain, SOB, palpitation, cough, hematuria, dysuria, recent traveling.        Patient is comfortable. No new complaints reported, No abdominal pain, N/V, hematemesis, hematochezia, melena, fever, chills, chest pain, SOB, cough or diarrhea reported.      PAIN MANAGEMENT:  Pain Scale:                 3/10  Pain Location:  Diffuse crampy abdominal pain       PAST MEDICAL HISTORY    Atrial fibrillation    Hyperlipidemia    Hypertension    CAD (coronary artery disease)    Colon cancer    Hypothyroidism    Obstructive sleep apnea    Transient ischemic attack (TIA)             PAST SURGICAL HISTORY     Colectomy        Allergies    No Known Allergies    Intolerances  None       SOCIAL HISTORY  Advanced Directives:       [ X ] Full Code       [  ] DNR  Marital Status:         [  ] M      [ X ] S      [  ] D       [  ] W  Children:       [ X ] Yes      [  ] No  Occupation:        [  ] Employed       [ X ] Unemployed       [  ] Retired  Diet:       [ X ] Regular       [  ] PEG feeding          [  ] NG tube feeding  Drug Use:           [  ] Patient denied          [  ] Yes  Alcohol:           [ X ] No             [  ] Yes (socially)         [  ] Yes (chronic)  Tobacco:           [  ] Yes           [ X ] No      FAMILY HISTORY  [ X ] Heart Disease            [ X ] Diabetes             [ X ] HTN             [  ] Colon Cancer             [  ] Stomach Cancer              [  ] Pancreatic Cancer            VITALS  Vital Signs Last 24 Hrs  T(C): 36.3 (02 Sep 2023 05:22), Max: 36.7 (01 Sep 2023 20:20)  T(F): 97.4 (02 Sep 2023 05:22), Max: 98 (01 Sep 2023 20:20)  HR: 89 (02 Sep 2023 05:22) (82 - 90)  BP: 128/84 (02 Sep 2023 05:22) (97/58 - 133/78)     RR: 18 (02 Sep 2023 05:22) (18 - 19)  SpO2: 96% (02 Sep 2023 05:22) (94% - 98%)    Parameters below as of 02 Sep 2023 05:22  Patient On (Oxygen Delivery Method): room air         MEDICATIONS  (STANDING):  apixaban 5 milliGRAM(s) Oral every 12 hours  aspirin  chewable 81 milliGRAM(s) Oral daily  atorvastatin 20 milliGRAM(s) Oral at bedtime  gabapentin 100 milliGRAM(s) Oral every 8 hours  lactated ringers. 1000 milliLiter(s) (80 mL/Hr) IV Continuous <Continuous>  levETIRAcetam 750 milliGRAM(s) Oral two times a day  levothyroxine 88 MICROGram(s) Oral daily  linezolid  IVPB 600 milliGRAM(s) IV Intermittent every 12 hours  linezolid  IVPB      melatonin 5 milliGRAM(s) Oral at bedtime  oxybutynin 10 milliGRAM(s) Oral daily  pantoprazole    Tablet 40 milliGRAM(s) Oral before breakfast  piperacillin/tazobactam IVPB.. 3.375 Gram(s) IV Intermittent every 8 hours  sodium chloride 0.9%. 1000 milliLiter(s) (75 mL/Hr) IV Continuous <Continuous>    MEDICATIONS  (PRN):  acetaminophen     Tablet .. 650 milliGRAM(s) Oral every 6 hours PRN Temp greater or equal to 38C (100.4F), Mild Pain (1 - 3)  albuterol    90 MICROgram(s) HFA Inhaler 2 Puff(s) Inhalation every 6 hours PRN Shortness of Breath and/or Wheezing  ALPRAZolam 0.25 milliGRAM(s) Oral two times a day PRN Anxiety/insomnia                            10.6   7.36  )-----------( 495      ( 01 Sep 2023 08:24 )             32.3       09-01    137  |  106  |  35<H>  ----------------------------<  102<H>  3.5   |  22  |  0.79    Ca    7.4<L>      01 Sep 2023 08:24  Phos  2.8     09-01  Mg     1.6     09-01    TPro  6.0  /  Alb  2.1<L>  /  TBili  0.4  /  DBili  x   /  AST  8<L>  /  ALT  23  /  AlkPhos  103  08-31        ACC: [id number] EXAM:  CT ABDOMEN AND PELVIS OC   ORDERED BY: MARQUITA MORFIN     PROCEDURE DATE:  09/01/2023          INTERPRETATION:  CLINICAL INFORMATION: Diarrhea. Status post colectomy in   early August.    COMPARISON: None.    CONTRAST/COMPLICATIONS:  IV Contrast: NONE  Oral Contrast: Omnipaque 350  Complications: None reported at time of study completion    PROCEDURE:  CT of the Abdomen and Pelvis was performed.  Sagittal and coronal reformats were performed.    FINDINGS:  LOWER CHEST: Minor atelectasis. Partially visualized left adrenal    LIVER: Fatty infiltration of liver.  BILE DUCTS: Normal caliber.  GALLBLADDER: Cholecystectomy.  SPLEEN: Within normal limits.  PANCREAS: Fatty atrophy.  ADRENALS: Within normal limits.  KIDNEYS/URETERS: 2 cm left upper pole renal cyst. Left upper pole renal   angiomyolipoma measures 1.3 cm.    BLADDER: Within normal limits.  REPRODUCTIVE ORGANS: Enlarged myomatous uterus.    BOWEL: No bowel obstruction. Status post colectomy with ileorectal   anastomosis. Fluid distends the small bowel and rectum.  PERITONEUM: No ascites.  VESSELS: Within normal limits.  RETROPERITONEUM/LYMPH NODES: No lymphadenopathy.  ABDOMINAL WALL: Postoperative changes. Subcutaneous emphysema in the   anterior abdominal wall extending from the pelvis to the lower chest.   Small fluid collection in the right lower abdominal wall measuring 3.5 x   1.9 cm, which may be postoperative.  BONES: Degenerative changes.    IMPRESSION:  Status post colectomy with ileorectal anastomosis.    Fluid throughout nonobstructed bowel and rectum, consistent with   enteritis.    Extensive subcutaneous emphysema in the anterior abdominal wall. While   findings may be postoperative given the history of recent surgery, in the   appropriate clinical context, necrotizing infection may be considered.   Careful clinical evaluation is therefore warranted.        [   ] ICU                                          [   ] CCU                                      [ X  ] Medical Floor      Patient is a 78 year old female with persistent diarrhea. GI consulted to evaluate.         Patient is a 78 year old female with past medical history significant for transverse colon neoplasm s/p colectomy in [facility] in early August 2023, HTN, HLD, CAD s/p LHC with TALI to RCA, Atrial fibrillation s/p watchman implant (3/23), hypothyroidism, PUJA, and TIA presented to the emergency room with 2 days history of lightheaded and feels lethargic. Patient reports when her blood pressure was checked in the facility it was in the low 80s. Patient reports that since her surgery earlier in August she is not eating much at all. She reports that since the surgery she has had diarrhea. Patient reports she used to have 5-6 episodes of loose watery bowel movements every day and now have improved to 2-3 bowel movements daily. Patient reports due to fear of worsening diarrhea she has decreased diet and oral intake. Patient reports she only takes soup and anything heavy makes her diarrhea worse associated with intermittent, diffuse, non radiating, 3/10 intensity, crampy abdominal pain. . Patient denies nausea, vomiting, hematemesis, hematochezia, melena, fever, chills, chest pain, SOB, palpitation, cough, hematuria, dysuria, recent traveling.        Patient is comfortable. No new complaints reported, No abdominal pain, N/V, hematemesis, hematochezia, melena, fever, chills, chest pain, SOB, cough or diarrhea reported.      PAIN MANAGEMENT:  Pain Scale:                 3/10  Pain Location:  Diffuse crampy abdominal pain       PAST MEDICAL HISTORY    Atrial fibrillation    Hyperlipidemia    Hypertension    CAD (coronary artery disease)    Colon cancer    Hypothyroidism    Obstructive sleep apnea    Transient ischemic attack (TIA)             PAST SURGICAL HISTORY     Colectomy        Allergies    No Known Allergies    Intolerances  None       SOCIAL HISTORY  Advanced Directives:       [ X ] Full Code       [  ] DNR  Marital Status:         [  ] M      [ X ] S      [  ] D       [  ] W  Children:       [ X ] Yes      [  ] No  Occupation:        [  ] Employed       [ X ] Unemployed       [  ] Retired  Diet:       [ X ] Regular       [  ] PEG feeding          [  ] NG tube feeding  Drug Use:           [  ] Patient denied          [  ] Yes  Alcohol:           [ X ] No             [  ] Yes (socially)         [  ] Yes (chronic)  Tobacco:           [  ] Yes           [ X ] No      FAMILY HISTORY  [ X ] Heart Disease            [ X ] Diabetes             [ X ] HTN             [  ] Colon Cancer             [  ] Stomach Cancer              [  ] Pancreatic Cancer            VITALS  Vital Signs Last 24 Hrs  T(C): 36.3 (02 Sep 2023 05:22), Max: 36.7 (01 Sep 2023 20:20)  T(F): 97.4 (02 Sep 2023 05:22), Max: 98 (01 Sep 2023 20:20)  HR: 89 (02 Sep 2023 05:22) (82 - 90)  BP: 128/84 (02 Sep 2023 05:22) (97/58 - 133/78)     RR: 18 (02 Sep 2023 05:22) (18 - 19)  SpO2: 96% (02 Sep 2023 05:22) (94% - 98%)    Parameters below as of 02 Sep 2023 05:22  Patient On (Oxygen Delivery Method): room air         MEDICATIONS  (STANDING):  apixaban 5 milliGRAM(s) Oral every 12 hours  aspirin  chewable 81 milliGRAM(s) Oral daily  atorvastatin 20 milliGRAM(s) Oral at bedtime  gabapentin 100 milliGRAM(s) Oral every 8 hours  lactated ringers. 1000 milliLiter(s) (80 mL/Hr) IV Continuous <Continuous>  levETIRAcetam 750 milliGRAM(s) Oral two times a day  levothyroxine 88 MICROGram(s) Oral daily  linezolid  IVPB 600 milliGRAM(s) IV Intermittent every 12 hours  linezolid  IVPB      melatonin 5 milliGRAM(s) Oral at bedtime  oxybutynin 10 milliGRAM(s) Oral daily  pantoprazole    Tablet 40 milliGRAM(s) Oral before breakfast  piperacillin/tazobactam IVPB.. 3.375 Gram(s) IV Intermittent every 8 hours  sodium chloride 0.9%. 1000 milliLiter(s) (75 mL/Hr) IV Continuous <Continuous>    MEDICATIONS  (PRN):  acetaminophen     Tablet .. 650 milliGRAM(s) Oral every 6 hours PRN Temp greater or equal to 38C (100.4F), Mild Pain (1 - 3)  albuterol    90 MICROgram(s) HFA Inhaler 2 Puff(s) Inhalation every 6 hours PRN Shortness of Breath and/or Wheezing  ALPRAZolam 0.25 milliGRAM(s) Oral two times a day PRN Anxiety/insomnia                            10.6   7.36  )-----------( 495      ( 01 Sep 2023 08:24 )             32.3       09-01    137  |  106  |  35<H>  ----------------------------<  102<H>  3.5   |  22  |  0.79    Ca    7.4<L>      01 Sep 2023 08:24  Phos  2.8     09-01  Mg     1.6     09-01    TPro  6.0  /  Alb  2.1<L>  /  TBili  0.4  /  DBili  x   /  AST  8<L>  /  ALT  23  /  AlkPhos  103  08-31        ACC: [id number] EXAM:  CT ABDOMEN AND PELVIS OC   ORDERED BY: MARQUITA MORFIN     PROCEDURE DATE:  09/01/2023          INTERPRETATION:  CLINICAL INFORMATION: Diarrhea. Status post colectomy in   early August.    COMPARISON: None.    CONTRAST/COMPLICATIONS:  IV Contrast: NONE  Oral Contrast: Omnipaque 350  Complications: None reported at time of study completion    PROCEDURE:  CT of the Abdomen and Pelvis was performed.  Sagittal and coronal reformats were performed.    FINDINGS:  LOWER CHEST: Minor atelectasis. Partially visualized left adrenal    LIVER: Fatty infiltration of liver.  BILE DUCTS: Normal caliber.  GALLBLADDER: Cholecystectomy.  SPLEEN: Within normal limits.  PANCREAS: Fatty atrophy.  ADRENALS: Within normal limits.  KIDNEYS/URETERS: 2 cm left upper pole renal cyst. Left upper pole renal   angiomyolipoma measures 1.3 cm.    BLADDER: Within normal limits.  REPRODUCTIVE ORGANS: Enlarged myomatous uterus.    BOWEL: No bowel obstruction. Status post colectomy with ileorectal   anastomosis. Fluid distends the small bowel and rectum.  PERITONEUM: No ascites.  VESSELS: Within normal limits.  RETROPERITONEUM/LYMPH NODES: No lymphadenopathy.  ABDOMINAL WALL: Postoperative changes. Subcutaneous emphysema in the   anterior abdominal wall extending from the pelvis to the lower chest.   Small fluid collection in the right lower abdominal wall measuring 3.5 x   1.9 cm, which may be postoperative.  BONES: Degenerative changes.    IMPRESSION:  Status post colectomy with ileorectal anastomosis.    Fluid throughout nonobstructed bowel and rectum, consistent with   enteritis.    Extensive subcutaneous emphysema in the anterior abdominal wall. While   findings may be postoperative given the history of recent surgery, in the   appropriate clinical context, necrotizing infection may be considered.   Careful clinical evaluation is therefore warranted.        [   ] ICU                                          [   ] CCU                                      [ X  ] Medical Floor      Patient is a 78 year old female with persistent diarrhea. GI consulted to evaluate.         Patient is a 78 year old female with past medical history significant for transverse colon neoplasm s/p colectomy in [facility] in early August 2023, HTN, HLD, CAD s/p LHC with TALI to RCA, Atrial fibrillation s/p watchman implant (3/23), hypothyroidism, PUJA, and TIA presented to the emergency room with 2 days history of lightheaded and feels lethargic. Patient reports when her blood pressure was checked in the facility it was in the low 80s. Patient reports that since her surgery earlier in August she is not eating much at all. She reports that since the surgery she has had diarrhea. Patient reports she used to have 5-6 episodes of loose watery bowel movements every day and now have improved to 2-3 bowel movements daily. Patient reports due to fear of worsening diarrhea she has decreased diet and oral intake. Patient reports she only takes soup and anything heavy makes her diarrhea worse associated with intermittent, diffuse, non radiating, 3/10 intensity, crampy abdominal pain. . Patient denies nausea, vomiting, hematemesis, hematochezia, melena, fever, chills, chest pain, SOB, palpitation, cough, hematuria, dysuria, recent traveling.        Patient is comfortable. No new complaints reported, No abdominal pain, N/V, hematemesis, hematochezia, melena, fever, chills, chest pain, SOB, cough or diarrhea reported.      PAIN MANAGEMENT:  Pain Scale:                 3/10  Pain Location:  Diffuse crampy abdominal pain       PAST MEDICAL HISTORY    Atrial fibrillation    Hyperlipidemia    Hypertension    CAD (coronary artery disease)    Colon cancer    Hypothyroidism    Obstructive sleep apnea    Transient ischemic attack (TIA)             PAST SURGICAL HISTORY     Colectomy        Allergies    No Known Allergies    Intolerances  None       SOCIAL HISTORY  Advanced Directives:       [ X ] Full Code       [  ] DNR  Marital Status:         [  ] M      [ X ] S      [  ] D       [  ] W  Children:       [ X ] Yes      [  ] No  Occupation:        [  ] Employed       [ X ] Unemployed       [  ] Retired  Diet:       [ X ] Regular       [  ] PEG feeding          [  ] NG tube feeding  Drug Use:           [  ] Patient denied          [  ] Yes  Alcohol:           [ X ] No             [  ] Yes (socially)         [  ] Yes (chronic)  Tobacco:           [  ] Yes           [ X ] No      FAMILY HISTORY  [ X ] Heart Disease            [ X ] Diabetes             [ X ] HTN             [  ] Colon Cancer             [  ] Stomach Cancer              [  ] Pancreatic Cancer            VITALS  Vital Signs Last 24 Hrs  T(C): 36.3 (02 Sep 2023 05:22), Max: 36.7 (01 Sep 2023 20:20)  T(F): 97.4 (02 Sep 2023 05:22), Max: 98 (01 Sep 2023 20:20)  HR: 89 (02 Sep 2023 05:22) (82 - 90)  BP: 128/84 (02 Sep 2023 05:22) (97/58 - 133/78)     RR: 18 (02 Sep 2023 05:22) (18 - 19)  SpO2: 96% (02 Sep 2023 05:22) (94% - 98%)    Parameters below as of 02 Sep 2023 05:22  Patient On (Oxygen Delivery Method): room air         MEDICATIONS  (STANDING):  apixaban 5 milliGRAM(s) Oral every 12 hours  aspirin  chewable 81 milliGRAM(s) Oral daily  atorvastatin 20 milliGRAM(s) Oral at bedtime  gabapentin 100 milliGRAM(s) Oral every 8 hours  lactated ringers. 1000 milliLiter(s) (80 mL/Hr) IV Continuous <Continuous>  levETIRAcetam 750 milliGRAM(s) Oral two times a day  levothyroxine 88 MICROGram(s) Oral daily  linezolid  IVPB 600 milliGRAM(s) IV Intermittent every 12 hours  linezolid  IVPB      melatonin 5 milliGRAM(s) Oral at bedtime  oxybutynin 10 milliGRAM(s) Oral daily  pantoprazole    Tablet 40 milliGRAM(s) Oral before breakfast  piperacillin/tazobactam IVPB.. 3.375 Gram(s) IV Intermittent every 8 hours  sodium chloride 0.9%. 1000 milliLiter(s) (75 mL/Hr) IV Continuous <Continuous>    MEDICATIONS  (PRN):  acetaminophen     Tablet .. 650 milliGRAM(s) Oral every 6 hours PRN Temp greater or equal to 38C (100.4F), Mild Pain (1 - 3)  albuterol    90 MICROgram(s) HFA Inhaler 2 Puff(s) Inhalation every 6 hours PRN Shortness of Breath and/or Wheezing  ALPRAZolam 0.25 milliGRAM(s) Oral two times a day PRN Anxiety/insomnia                            10.6   7.36  )-----------( 495      ( 01 Sep 2023 08:24 )             32.3       09-01    137  |  106  |  35<H>  ----------------------------<  102<H>  3.5   |  22  |  0.79    Ca    7.4<L>      01 Sep 2023 08:24  Phos  2.8     09-01  Mg     1.6     09-01    TPro  6.0  /  Alb  2.1<L>  /  TBili  0.4  /  DBili  x   /  AST  8<L>  /  ALT  23  /  AlkPhos  103  08-31        ACC: [id number] EXAM:  CT ABDOMEN AND PELVIS OC   ORDERED BY: MARQUITA MORFIN     PROCEDURE DATE:  09/01/2023          INTERPRETATION:  CLINICAL INFORMATION: Diarrhea. Status post colectomy in   early August.    COMPARISON: None.    CONTRAST/COMPLICATIONS:  IV Contrast: NONE  Oral Contrast: Omnipaque 350  Complications: None reported at time of study completion    PROCEDURE:  CT of the Abdomen and Pelvis was performed.  Sagittal and coronal reformats were performed.    FINDINGS:  LOWER CHEST: Minor atelectasis. Partially visualized left adrenal    LIVER: Fatty infiltration of liver.  BILE DUCTS: Normal caliber.  GALLBLADDER: Cholecystectomy.  SPLEEN: Within normal limits.  PANCREAS: Fatty atrophy.  ADRENALS: Within normal limits.  KIDNEYS/URETERS: 2 cm left upper pole renal cyst. Left upper pole renal   angiomyolipoma measures 1.3 cm.    BLADDER: Within normal limits.  REPRODUCTIVE ORGANS: Enlarged myomatous uterus.    BOWEL: No bowel obstruction. Status post colectomy with ileorectal   anastomosis. Fluid distends the small bowel and rectum.  PERITONEUM: No ascites.  VESSELS: Within normal limits.  RETROPERITONEUM/LYMPH NODES: No lymphadenopathy.  ABDOMINAL WALL: Postoperative changes. Subcutaneous emphysema in the   anterior abdominal wall extending from the pelvis to the lower chest.   Small fluid collection in the right lower abdominal wall measuring 3.5 x   1.9 cm, which may be postoperative.  BONES: Degenerative changes.    IMPRESSION:  Status post colectomy with ileorectal anastomosis.    Fluid throughout nonobstructed bowel and rectum, consistent with   enteritis.    Extensive subcutaneous emphysema in the anterior abdominal wall. While   findings may be postoperative given the history of recent surgery, in the   appropriate clinical context, necrotizing infection may be considered.   Careful clinical evaluation is therefore warranted.

## 2023-09-02 NOTE — PROGRESS NOTE ADULT - GASTROINTESTINAL
details… soft/nontender/nondistended/normal active bowel sounds/no guarding/no rigidity/no palpable peg

## 2023-09-02 NOTE — CHART NOTE - NSCHARTNOTEFT_GEN_A_CORE
EVENT: Patient with emesis with odor of fecal material    SUBJECTIVE: Patient status post vomiting episode     OBJECTIVE:  Vital Signs Last 24 Hrs  T(C): 36.3 (02 Sep 2023 05:22), Max: 36.9 (01 Sep 2023 06:26)  T(F): 97.4 (02 Sep 2023 05:22), Max: 98.4 (01 Sep 2023 06:26)  HR: 89 (02 Sep 2023 05:22) (82 - 90)  BP: 128/84 (02 Sep 2023 05:22) (97/58 - 133/78)  BP(mean): --  RR: 18 (02 Sep 2023 05:22) (18 - 19)  SpO2: 96% (02 Sep 2023 05:22) (94% - 98%)    Parameters below as of 02 Sep 2023 05:22  Patient On (Oxygen Delivery Method): nasal cannula  O2 Flow (L/min): 2      LABS:                        10.6   7.36  )-----------( 495      ( 01 Sep 2023 08:24 )             32.3     09-01    137  |  106  |  35<H>  ----------------------------<  102<H>  3.5   |  22  |  0.79    Ca    7.4<L>      01 Sep 2023 08:24  Phos  2.8     09-01  Mg     1.6     09-01    TPro  6.0  /  Alb  2.1<L>  /  TBili  0.4  /  DBili  x   /  AST  8<L>  /  ALT  23  /  AlkPhos  103  08-31      EKG:   IMGAGING:    ASSESSMENT:  HPI:  Patient is a 77 y/o F with PMH transverse colon neoplasm s/p colectomy in [facility] 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 Patient reports that since yesterday she feels weak and tired. Patient also reports that she feels lightheaded more than usual and feels lethargic. Patient reports when her blood pressure was checked in the facility it was in the low 80s. Patient reports that since her surgery earlier in August she is not eating much at all. She reports that since the surgery she has had diarrhea. Patient reports she used to have 5-6 episodes of loose watery bowel movements every day and now have improved to 2-3 bowel movements daily. Patient reports due to fear of worsening diarrhea she has decreased diet and oral intake. Patient reports she only takes soup and anything heavy makes her diarrhea worse. Patient denies any nausea, vomiting, constipation associated with it. Patient reports that she feels dizzy and lightheaded when she stands up from a sitting position that has worsened since yesterday. Patient also denies any recent fevers, chills, headache, chest pain, palpitations, shortness of breath, cough, nausea, vomiting, constipation, melena, hematochezia, dysuria, urinary frequency, or urgency. Patient denies any other complains at this time.  (01 Sep 2023 01:27)      PLAN: EVENT: Patient with emesis with odor of fecal material    SUBJECTIVE: Patient status post vomiting episode     OBJECTIVE:  Vital Signs Last 24 Hrs  T(C): 36.3 (02 Sep 2023 05:22), Max: 36.9 (01 Sep 2023 06:26)  T(F): 97.4 (02 Sep 2023 05:22), Max: 98.4 (01 Sep 2023 06:26)  HR: 89 (02 Sep 2023 05:22) (82 - 90)  BP: 128/84 (02 Sep 2023 05:22) (97/58 - 133/78)  BP(mean): --  RR: 18 (02 Sep 2023 05:22) (18 - 19)  SpO2: 96% (02 Sep 2023 05:22) (94% - 98%)    Parameters below as of 02 Sep 2023 05:22  Patient On (Oxygen Delivery Method): nasal cannula  O2 Flow (L/min): 2      LABS:                        10.6   7.36  )-----------( 495      ( 01 Sep 2023 08:24 )             32.3     09-01    137  |  106  |  35<H>  ----------------------------<  102<H>  3.5   |  22  |  0.79    Ca    7.4<L>      01 Sep 2023 08:24  Phos  2.8     09-01  Mg     1.6     09-01    TPro  6.0  /  Alb  2.1<L>  /  TBili  0.4  /  DBili  x   /  AST  8<L>  /  ALT  23  /  AlkPhos  103  08-31      EKG:   IMGAGING:    ASSESSMENT:  HPI:  Patient is a 79 y/o F with PMH transverse colon neoplasm s/p colectomy in [facility] 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 Patient reports that since yesterday she feels weak and tired. Patient also reports that she feels lightheaded more than usual and feels lethargic. Patient reports when her blood pressure was checked in the facility it was in the low 80s. Patient reports that since her surgery earlier in August she is not eating much at all. She reports that since the surgery she has had diarrhea. Patient reports she used to have 5-6 episodes of loose watery bowel movements every day and now have improved to 2-3 bowel movements daily. Patient reports due to fear of worsening diarrhea she has decreased diet and oral intake. Patient reports she only takes soup and anything heavy makes her diarrhea worse. Patient denies any nausea, vomiting, constipation associated with it. Patient reports that she feels dizzy and lightheaded when she stands up from a sitting position that has worsened since yesterday. Patient also denies any recent fevers, chills, headache, chest pain, palpitations, shortness of breath, cough, nausea, vomiting, constipation, melena, hematochezia, dysuria, urinary frequency, or urgency. Patient denies any other complains at this time.  (01 Sep 2023 01:27)      PLAN: EVENT: Patient with emesis with odor of fecal material    SUBJECTIVE: Patient status post vomiting episode     OBJECTIVE:  Vital Signs Last 24 Hrs  T(C): 36.3 (02 Sep 2023 05:22), Max: 36.9 (01 Sep 2023 06:26)  T(F): 97.4 (02 Sep 2023 05:22), Max: 98.4 (01 Sep 2023 06:26)  HR: 89 (02 Sep 2023 05:22) (82 - 90)  BP: 128/84 (02 Sep 2023 05:22) (97/58 - 133/78)  BP(mean): --  RR: 18 (02 Sep 2023 05:22) (18 - 19)  SpO2: 96% (02 Sep 2023 05:22) (94% - 98%)    Parameters below as of 02 Sep 2023 05:22  Patient On (Oxygen Delivery Method): nasal cannula  O2 Flow (L/min): 2      LABS:                        10.6   7.36  )-----------( 495      ( 01 Sep 2023 08:24 )             32.3     09-01    137  |  106  |  35<H>  ----------------------------<  102<H>  3.5   |  22  |  0.79    Ca    7.4<L>      01 Sep 2023 08:24  Phos  2.8     09-01  Mg     1.6     09-01    TPro  6.0  /  Alb  2.1<L>  /  TBili  0.4  /  DBili  x   /  AST  8<L>  /  ALT  23  /  AlkPhos  103  08-31      EKG:   IMGAGING:    ASSESSMENT:  HPI:  Patient is a 77 y/o F with PMH transverse colon neoplasm s/p colectomy in [facility] 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 Patient reports that since yesterday she feels weak and tired. Patient also reports that she feels lightheaded more than usual and feels lethargic. Patient reports when her blood pressure was checked in the facility it was in the low 80s. Patient reports that since her surgery earlier in August she is not eating much at all. She reports that since the surgery she has had diarrhea. Patient reports she used to have 5-6 episodes of loose watery bowel movements every day and now have improved to 2-3 bowel movements daily. Patient reports due to fear of worsening diarrhea she has decreased diet and oral intake. Patient reports she only takes soup and anything heavy makes her diarrhea worse. Patient denies any nausea, vomiting, constipation associated with it. Patient reports that she feels dizzy and lightheaded when she stands up from a sitting position that has worsened since yesterday. Patient also denies any recent fevers, chills, headache, chest pain, palpitations, shortness of breath, cough, nausea, vomiting, constipation, melena, hematochezia, dysuria, urinary frequency, or urgency. Patient denies any other complains at this time.  (01 Sep 2023 01:27)      PLAN: MEDICINE NP    Notified by RN patient with Brown colored emesis with fecal odor. Seen and examined patient at bedside. Patient is alert, NAD. Denies HA, CP, SOB, cough, N/V, or abd pain.    VITAL SIGNS:  T(C): 36.3 (09-02-23 @ 05:22), Max: 36.9 (09-01-23 @ 06:26)  HR: 89 (09-02-23 @ 05:22) (82 - 90)  BP: 128/84 (09-02-23 @ 05:22) (97/58 - 133/78)  RR: 18 (09-02-23 @ 05:22) (18 - 19)  SpO2: 96% (09-02-23 @ 05:22) (94% - 98%)  Wt(kg): --      LABORATORY:                          10.6   7.36  )-----------( 495      ( 01 Sep 2023 08:24 )             32.3       09-01    137  |  106  |  35<H>  ----------------------------<  102<H>  3.5   |  22  |  0.79    Ca    7.4<L>      01 Sep 2023 08:24  Phos  2.8     09-01  Mg     1.6     09-01    TPro  6.0  /  Alb  2.1<L>  /  TBili  0.4  /  DBili  x   /  AST  8<L>  /  ALT  23  /  AlkPhos  103  08-31          MICROBIOLOGY:           RADIOLOGY:    CT; A/P shows Status post colectomy with ileorectal anastomosis.    Fluid throughout nonobstructed bowel and rectum, consistent with enteritis.    Extensive subcutaneous emphysema in the anterior abdominal wall. While findings may be postoperative given the history of recent surgery, in the appropriate clinical context, necrotizing infection may be considered. Careful clinical evaluation is therefore warranted.        PHYSICAL EXAM:    Constitutional: AOx3. NAD.    Respiratory: clear lungs bilaterally. No wheezing, rhonchi, or crackles.    Cardiovascular: S1 S2. No murmurs.    Gastrointestinal: Hypoactive BS X4 active. soft. nontender.    Extremities/Vascular: +2 pulses bilaterally. No BLE edema.      ASSESSMENT/PLAN:   HPI:  Patient is a 77 y/o F with PMH transverse colon neoplasm s/p colectomy in [facility] 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 Patient reports that since yesterday she feels weak and tired. Patient also reports that she feels lightheaded more than usual and feels lethargic. Patient reports when her blood pressure was checked in the facility it was in the low 80s. Patient reports that since her surgery earlier in August she is not eating much at all. She reports that since the surgery she has had diarrhea. Patient reports she used to have 5-6 episodes of loose watery bowel movements every day and now have improved to 2-3 bowel movements daily. Patient reports due to fear of worsening diarrhea she has decreased diet and oral intake. Patient reports she only takes soup and anything heavy makes her diarrhea worse. Patient denies any nausea, vomiting, constipation associated with it. Patient reports that she feels dizzy and lightheaded when she stands up from a sitting position that has worsened since yesterday. Patient also denies any recent fevers, chills, headache, chest pain, palpitations, shortness of breath, cough, nausea, vomiting, constipation, melena, hematochezia, dysuria, urinary frequency, or urgency. Patient denies any other complains at this time. Brown colored emesis with fecal odor        1) Emesis   -NPO  -ABD xray  -Stool sample sent  -c/w IVF  -F/U primary team in AM MEDICINE NP    Notified by RN patient with Brown colored emesis with fecal odor. Seen and examined patient at bedside. Patient is alert, NAD. Denies HA, CP, SOB, cough, N/V, or abd pain.    VITAL SIGNS:  T(C): 36.3 (09-02-23 @ 05:22), Max: 36.9 (09-01-23 @ 06:26)  HR: 89 (09-02-23 @ 05:22) (82 - 90)  BP: 128/84 (09-02-23 @ 05:22) (97/58 - 133/78)  RR: 18 (09-02-23 @ 05:22) (18 - 19)  SpO2: 96% (09-02-23 @ 05:22) (94% - 98%)  Wt(kg): --      LABORATORY:                          10.6   7.36  )-----------( 495      ( 01 Sep 2023 08:24 )             32.3       09-01    137  |  106  |  35<H>  ----------------------------<  102<H>  3.5   |  22  |  0.79    Ca    7.4<L>      01 Sep 2023 08:24  Phos  2.8     09-01  Mg     1.6     09-01    TPro  6.0  /  Alb  2.1<L>  /  TBili  0.4  /  DBili  x   /  AST  8<L>  /  ALT  23  /  AlkPhos  103  08-31          MICROBIOLOGY:           RADIOLOGY:    CT; A/P shows Status post colectomy with ileorectal anastomosis.    Fluid throughout nonobstructed bowel and rectum, consistent with enteritis.    Extensive subcutaneous emphysema in the anterior abdominal wall. While findings may be postoperative given the history of recent surgery, in the appropriate clinical context, necrotizing infection may be considered. Careful clinical evaluation is therefore warranted.        PHYSICAL EXAM:    Constitutional: AOx3. NAD.    Respiratory: clear lungs bilaterally. No wheezing, rhonchi, or crackles.    Cardiovascular: S1 S2. No murmurs.    Gastrointestinal: Hypoactive BS X4 active. soft. nontender.    Extremities/Vascular: +2 pulses bilaterally. No BLE edema.      ASSESSMENT/PLAN:   HPI:  Patient is a 77 y/o F with PMH transverse colon neoplasm s/p colectomy in [facility] 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 Patient reports that since yesterday she feels weak and tired. Patient also reports that she feels lightheaded more than usual and feels lethargic. Patient reports when her blood pressure was checked in the facility it was in the low 80s. Patient reports that since her surgery earlier in August she is not eating much at all. She reports that since the surgery she has had diarrhea. Patient reports she used to have 5-6 episodes of loose watery bowel movements every day and now have improved to 2-3 bowel movements daily. Patient reports due to fear of worsening diarrhea she has decreased diet and oral intake. Patient reports she only takes soup and anything heavy makes her diarrhea worse. Patient denies any nausea, vomiting, constipation associated with it. Patient reports that she feels dizzy and lightheaded when she stands up from a sitting position that has worsened since yesterday. Patient also denies any recent fevers, chills, headache, chest pain, palpitations, shortness of breath, cough, nausea, vomiting, constipation, melena, hematochezia, dysuria, urinary frequency, or urgency. Patient denies any other complains at this time. Brown colored emesis with fecal odor        1) Emesis   -NPO  -ABD xray  -Stool sample sent  -c/w IVF  -F/U primary team in AM MEDICINE NP    Notified by RN patient with Brown colored emesis with fecal odor. Seen and examined patient at bedside. Patient is alert, NAD. Denies HA, CP, SOB, cough, N/V, or abd pain.    VITAL SIGNS:  T(C): 36.3 (09-02-23 @ 05:22), Max: 36.9 (09-01-23 @ 06:26)  HR: 89 (09-02-23 @ 05:22) (82 - 90)  BP: 128/84 (09-02-23 @ 05:22) (97/58 - 133/78)  RR: 18 (09-02-23 @ 05:22) (18 - 19)  SpO2: 96% (09-02-23 @ 05:22) (94% - 98%)  Wt(kg): --      LABORATORY:                          10.6   7.36  )-----------( 495      ( 01 Sep 2023 08:24 )             32.3       09-01    137  |  106  |  35<H>  ----------------------------<  102<H>  3.5   |  22  |  0.79    Ca    7.4<L>      01 Sep 2023 08:24  Phos  2.8     09-01  Mg     1.6     09-01    TPro  6.0  /  Alb  2.1<L>  /  TBili  0.4  /  DBili  x   /  AST  8<L>  /  ALT  23  /  AlkPhos  103  08-31          MICROBIOLOGY:           RADIOLOGY:    CT; A/P shows Status post colectomy with ileorectal anastomosis.    Fluid throughout nonobstructed bowel and rectum, consistent with enteritis.    Extensive subcutaneous emphysema in the anterior abdominal wall. While findings may be postoperative given the history of recent surgery, in the appropriate clinical context, necrotizing infection may be considered. Careful clinical evaluation is therefore warranted.        PHYSICAL EXAM:    Constitutional: AOx3. NAD.    Respiratory: clear lungs bilaterally. No wheezing, rhonchi, or crackles.    Cardiovascular: S1 S2. No murmurs.    Gastrointestinal: Hypoactive BS X4 active. soft. nontender.    Extremities/Vascular: +2 pulses bilaterally. No BLE edema.      ASSESSMENT/PLAN:   HPI:  Patient is a 77 y/o F with PMH transverse colon neoplasm s/p colectomy in [facility] 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 Patient reports that since yesterday she feels weak and tired. Patient also reports that she feels lightheaded more than usual and feels lethargic. Patient reports when her blood pressure was checked in the facility it was in the low 80s. Patient reports that since her surgery earlier in August she is not eating much at all. She reports that since the surgery she has had diarrhea. Patient reports she used to have 5-6 episodes of loose watery bowel movements every day and now have improved to 2-3 bowel movements daily. Patient reports due to fear of worsening diarrhea she has decreased diet and oral intake. Patient reports she only takes soup and anything heavy makes her diarrhea worse. Patient denies any nausea, vomiting, constipation associated with it. Patient reports that she feels dizzy and lightheaded when she stands up from a sitting position that has worsened since yesterday. Patient also denies any recent fevers, chills, headache, chest pain, palpitations, shortness of breath, cough, nausea, vomiting, constipation, melena, hematochezia, dysuria, urinary frequency, or urgency. Patient denies any other complains at this time. Brown colored emesis with fecal odor        1) Emesis   -NPO  -ABD xray  -Stool sample sent  -c/w IVF  -F/U primary team in AM

## 2023-09-02 NOTE — PROGRESS NOTE ADULT - SUBJECTIVE AND OBJECTIVE BOX
Date of Service 09-02-23 @ 12:03    CHIEF COMPLAINT:Patient is a 78y old  Female who presents with a chief complaint of weakness,hypotension. Pt had allergic rx to zosyn yesterday.  	  REVIEW OF SYSTEMS:  CONSTITUTIONAL: No fever, weight loss, or fatigue  EYES: No eye pain, visual disturbances, or discharge  ENT:  No difficulty hearing, tinnitus, vertigo; No sinus or throat pain  NECK: No pain or stiffness  RESPIRATORY: No cough, wheezing, chills or hemoptysis; No Shortness of Breath  CARDIOVASCULAR: No chest pain, palpitations, passing out, dizziness, or leg swelling  GASTROINTESTINAL: No abdominal or epigastric pain. No nausea, vomiting, or hematemesis; No diarrhea or constipation. No melena or hematochezia.  GENITOURINARY: No dysuria, frequency, hematuria, or incontinence  NEUROLOGICAL: No headaches, memory loss, loss of strength, numbness, or tremors  SKIN: No itching, burning, rashes, or lesions   LYMPH Nodes: No enlarged glands  ENDOCRINE: No heat or cold intolerance; No hair loss  MUSCULOSKELETAL: No joint pain or swelling; No muscle, back, or extremity pain  PSYCHIATRIC: No depression, anxiety, mood swings, or difficulty sleeping  HEME/LYMPH: No easy bruising, or bleeding gums  ALLERGY AND IMMUNOLOGIC: No hives or eczema	        PHYSICAL EXAM:  T(C): 36.3 (09-02-23 @ 05:22), Max: 36.7 (09-01-23 @ 20:20)  HR: 89 (09-02-23 @ 05:22) (82 - 90)  BP: 128/84 (09-02-23 @ 05:22) (97/58 - 133/78)  RR: 18 (09-02-23 @ 05:22) (18 - 19)  SpO2: 96% (09-02-23 @ 05:22) (94% - 98%)  Wt(kg): --  I&O's Summary      Appearance: Normal	  HEENT:   Normal oral mucosa, PERRL, EOMI	  Lymphatic: No lymphadenopathy  Cardiovascular: Normal S1 S2, No JVD, No murmurs, No edema  Respiratory: Lungs clear to auscultation	  Psychiatry: A & O x 3, Mood & affect appropriate  Gastrointestinal:  Soft, Non-tender, + BS	  Skin: No rashes, No ecchymoses, No cyanosis	  Neurologic: Non-focal  Extremities: Normal range of motion, No clubbing, cyanosis or edema  Vascular: Peripheral pulses palpable 2+ bilaterally    MEDICATIONS  (STANDING):  apixaban 5 milliGRAM(s) Oral every 12 hours  aspirin  chewable 81 milliGRAM(s) Oral daily  atorvastatin 20 milliGRAM(s) Oral at bedtime  gabapentin 100 milliGRAM(s) Oral every 8 hours  lactated ringers. 1000 milliLiter(s) (80 mL/Hr) IV Continuous <Continuous>  levETIRAcetam 750 milliGRAM(s) Oral two times a day  levothyroxine 88 MICROGram(s) Oral daily  linezolid  IVPB 600 milliGRAM(s) IV Intermittent every 12 hours  linezolid  IVPB      melatonin 5 milliGRAM(s) Oral at bedtime  oxybutynin 10 milliGRAM(s) Oral daily  pantoprazole    Tablet 40 milliGRAM(s) Oral before breakfast  piperacillin/tazobactam IVPB.. 3.375 Gram(s) IV Intermittent every 8 hours  sodium chloride 0.9%. 1000 milliLiter(s) (75 mL/Hr) IV Continuous <Continuous>        LABS:	 	                          11.5   9.21  )-----------( 524      ( 02 Sep 2023 06:58 )             35.3     09-02    137  |  106  |  27<H>  ----------------------------<  121<H>  3.5   |  22  |  0.74    Ca    8.1<L>      02 Sep 2023 06:58  Phos  2.8     09-01  Mg     1.6     09-01    TPro  6.0  /  Alb  2.1<L>  /  TBili  0.4  /  DBili  x   /  AST  8<L>  /  ALT  23  /  AlkPhos  103  08-31      Lipid Profile: Cholesterol 87  LDL --  HDL 55  TG 74  Ldl calc 17      HgA1c:   TSH: Thyroid Stimulating Hormone, Serum: 0.84 uU/mL (09-02 @ 06:58)      	      Culture - Urine (09.01.23 @ 03:10)   Specimen Source: Clean Catch Clean Catch (Midstream)  Culture Results:   >100,000 CFU/ml Gram Negative Rods< from: CT Abdomen and Pelvis w/ Oral Cont (09.01.23 @ 15:20) >  ACC: [id number] EXAM:  CT ABDOMEN AND PELVIS OC   ORDERED BY: MARQUITA MORFIN     PROCEDURE DATE:  09/01/2023          INTERPRETATION:  CLINICAL INFORMATION: Diarrhea. Status post colectomy in   early August.    COMPARISON: None.    CONTRAST/COMPLICATIONS:  IV Contrast: NONE  Oral Contrast: Omnipaque 350  Complications: None reported at time of study completion    PROCEDURE:  CT of the Abdomen and Pelvis was performed.  Sagittal and coronal reformats were performed.    FINDINGS:  LOWER CHEST: Minor atelectasis. Partially visualized left adrenal    LIVER: Fatty infiltration of liver.  BILE DUCTS: Normal caliber.  GALLBLADDER: Cholecystectomy.  SPLEEN: Within normal limits.  PANCREAS: Fatty atrophy.  ADRENALS: Within normal limits.  KIDNEYS/URETERS: 2 cm left upper pole renal cyst. Left upper pole renal   angiomyolipoma measures 1.3 cm.    BLADDER: Within normal limits.  REPRODUCTIVE ORGANS: Enlarged myomatous uterus.    BOWEL: No bowel obstruction. Status post colectomy with ileorectal   anastomosis. Fluid distends the small bowel and rectum.  PERITONEUM: No ascites.  VESSELS: Within normal limits.  RETROPERITONEUM/LYMPH NODES: No lymphadenopathy.  ABDOMINAL WALL: Postoperative changes. Subcutaneous emphysema in the   anterior abdominal wall extending from the pelvis to the lower chest.   Small fluid collection in the right lower abdominal wall measuring 3.5 x   1.9 cm, which may be postoperative.  BONES: Degenerative changes.    IMPRESSION:  Status post colectomy with ileorectal anastomosis.    Fluid throughout nonobstructed bowel and rectum, consistent with   enteritis.    Extensive subcutaneous emphysema in the anterior abdominal wall. While   findings may be postoperative given the history of recent surgery, in the   appropriate clinical context, necrotizing infection may be considered.   Careful clinical evaluation is therefore warranted.    These findings were discussed with Dr. MARQUITA MORFIN [phone] at     < end of copied text >     Date of Service 09-02-23 @ 12:03    CHIEF COMPLAINT:Patient is a 78y old  Female who presents with a chief complaint of weakness,hypotension. Pt had allergic rx to zosyn yesterday.  	  REVIEW OF SYSTEMS:  CONSTITUTIONAL: No fever, weight loss, or fatigue  EYES: No eye pain, visual disturbances, or discharge  ENT:  No difficulty hearing, tinnitus, vertigo; No sinus or throat pain  NECK: No pain or stiffness  RESPIRATORY: No cough, wheezing, chills or hemoptysis; No Shortness of Breath  CARDIOVASCULAR: No chest pain, palpitations, passing out, dizziness, or leg swelling  GASTROINTESTINAL: No abdominal or epigastric pain. No nausea, vomiting, or hematemesis; No diarrhea or constipation. No melena or hematochezia.  GENITOURINARY: No dysuria, frequency, hematuria, or incontinence  NEUROLOGICAL: No headaches, memory loss, loss of strength, numbness, or tremors  SKIN: No itching, burning, rashes, or lesions   LYMPH Nodes: No enlarged glands  ENDOCRINE: No heat or cold intolerance; No hair loss  MUSCULOSKELETAL: No joint pain or swelling; No muscle, back, or extremity pain  PSYCHIATRIC: No depression, anxiety, mood swings, or difficulty sleeping  HEME/LYMPH: No easy bruising, or bleeding gums  ALLERGY AND IMMUNOLOGIC: No hives or eczema	        PHYSICAL EXAM:  T(C): 36.3 (09-02-23 @ 05:22), Max: 36.7 (09-01-23 @ 20:20)  HR: 89 (09-02-23 @ 05:22) (82 - 90)  BP: 128/84 (09-02-23 @ 05:22) (97/58 - 133/78)  RR: 18 (09-02-23 @ 05:22) (18 - 19)  SpO2: 96% (09-02-23 @ 05:22) (94% - 98%)  Wt(kg): --  I&O's Summary      Appearance: Normal	  HEENT:   Normal oral mucosa, PERRL, EOMI	  Lymphatic: No lymphadenopathy  Cardiovascular: Normal S1 S2, No JVD, No murmurs, No edema  Respiratory: Lungs clear to auscultation	  Psychiatry: A & O x 3, Mood & affect appropriate  Gastrointestinal:  Soft, Non-tender, + BS	  Skin: No rashes, No ecchymoses, No cyanosis	  Neurologic: Non-focal  Extremities: Normal range of motion, No clubbing, cyanosis or edema  Vascular: Peripheral pulses palpable 2+ bilaterally    MEDICATIONS  (STANDING):  apixaban 5 milliGRAM(s) Oral every 12 hours  aspirin  chewable 81 milliGRAM(s) Oral daily  atorvastatin 20 milliGRAM(s) Oral at bedtime  gabapentin 100 milliGRAM(s) Oral every 8 hours  lactated ringers. 1000 milliLiter(s) (80 mL/Hr) IV Continuous <Continuous>  levETIRAcetam 750 milliGRAM(s) Oral two times a day  levothyroxine 88 MICROGram(s) Oral daily  linezolid  IVPB 600 milliGRAM(s) IV Intermittent every 12 hours  linezolid  IVPB      melatonin 5 milliGRAM(s) Oral at bedtime  oxybutynin 10 milliGRAM(s) Oral daily  pantoprazole    Tablet 40 milliGRAM(s) Oral before breakfast  piperacillin/tazobactam IVPB.. 3.375 Gram(s) IV Intermittent every 8 hours  sodium chloride 0.9%. 1000 milliLiter(s) (75 mL/Hr) IV Continuous <Continuous>        LABS:	 	                          11.5   9.21  )-----------( 524      ( 02 Sep 2023 06:58 )             35.3     09-02    137  |  106  |  27<H>  ----------------------------<  121<H>  3.5   |  22  |  0.74    Ca    8.1<L>      02 Sep 2023 06:58  Phos  2.8     09-01  Mg     1.6     09-01    TPro  6.0  /  Alb  2.1<L>  /  TBili  0.4  /  DBili  x   /  AST  8<L>  /  ALT  23  /  AlkPhos  103  08-31      Lipid Profile: Cholesterol 87  LDL --  HDL 55  TG 74  Ldl calc 17      HgA1c:   TSH: Thyroid Stimulating Hormone, Serum: 0.84 uU/mL (09-02 @ 06:58)      	      Culture - Urine (09.01.23 @ 03:10)   Specimen Source: Clean Catch Clean Catch (Midstream)  Culture Results:   >100,000 CFU/ml Gram Negative Rods< from: CT Abdomen and Pelvis w/ Oral Cont (09.01.23 @ 15:20) >  ACC: [id number] EXAM:  CT ABDOMEN AND PELVIS OC   ORDERED BY: MARQUITA MORFIN     PROCEDURE DATE:  09/01/2023          INTERPRETATION:  CLINICAL INFORMATION: Diarrhea. Status post colectomy in   early August.    COMPARISON: None.    CONTRAST/COMPLICATIONS:  IV Contrast: NONE  Oral Contrast: Omnipaque 350  Complications: None reported at time of study completion    PROCEDURE:  CT of the Abdomen and Pelvis was performed.  Sagittal and coronal reformats were performed.    FINDINGS:  LOWER CHEST: Minor atelectasis. Partially visualized left adrenal    LIVER: Fatty infiltration of liver.  BILE DUCTS: Normal caliber.  GALLBLADDER: Cholecystectomy.  SPLEEN: Within normal limits.  PANCREAS: Fatty atrophy.  ADRENALS: Within normal limits.  KIDNEYS/URETERS: 2 cm left upper pole renal cyst. Left upper pole renal   angiomyolipoma measures 1.3 cm.    BLADDER: Within normal limits.  REPRODUCTIVE ORGANS: Enlarged myomatous uterus.    BOWEL: No bowel obstruction. Status post colectomy with ileorectal   anastomosis. Fluid distends the small bowel and rectum.  PERITONEUM: No ascites.  VESSELS: Within normal limits.  RETROPERITONEUM/LYMPH NODES: No lymphadenopathy.  ABDOMINAL WALL: Postoperative changes. Subcutaneous emphysema in the   anterior abdominal wall extending from the pelvis to the lower chest.   Small fluid collection in the right lower abdominal wall measuring 3.5 x   1.9 cm, which may be postoperative.  BONES: Degenerative changes.    IMPRESSION:  Status post colectomy with ileorectal anastomosis.    Fluid throughout nonobstructed bowel and rectum, consistent with   enteritis.    Extensive subcutaneous emphysema in the anterior abdominal wall. While   findings may be postoperative given the history of recent surgery, in the   appropriate clinical context, necrotizing infection may be considered.   Careful clinical evaluation is therefore warranted.    These findings were discussed with Dr. MARQUITA MORFIN [phone] at     < end of copied text >     Date of Service 09-02-23 @ 12:03    CHIEF COMPLAINT:Patient is a 78y old  Female who presents with a chief complaint of weakness,hypotension. Pt had allergic rx to zosyn yesterday.  	  REVIEW OF SYSTEMS:  CONSTITUTIONAL: No fever, weight loss, or fatigue  EYES: No eye pain, visual disturbances, or discharge  ENT:  No difficulty hearing, tinnitus, vertigo; No sinus or throat pain  NECK: No pain or stiffness  RESPIRATORY: No cough, wheezing, chills or hemoptysis; No Shortness of Breath  CARDIOVASCULAR: No chest pain, palpitations, passing out, dizziness, or leg swelling  GASTROINTESTINAL: No abdominal or epigastric pain. No nausea, vomiting, or hematemesis; No diarrhea or constipation. No melena or hematochezia.  GENITOURINARY: No dysuria, frequency, hematuria, or incontinence  NEUROLOGICAL: No headaches, memory loss, loss of strength, numbness, or tremors  SKIN: No itching, burning, rashes, or lesions   LYMPH Nodes: No enlarged glands  ENDOCRINE: No heat or cold intolerance; No hair loss  MUSCULOSKELETAL: No joint pain or swelling; No muscle, back, or extremity pain  PSYCHIATRIC: No depression, anxiety, mood swings, or difficulty sleeping  HEME/LYMPH: No easy bruising, or bleeding gums  ALLERGY AND IMMUNOLOGIC: No hives or eczema	        PHYSICAL EXAM:  T(C): 36.3 (09-02-23 @ 05:22), Max: 36.7 (09-01-23 @ 20:20)  HR: 89 (09-02-23 @ 05:22) (82 - 90)  BP: 128/84 (09-02-23 @ 05:22) (97/58 - 133/78)  RR: 18 (09-02-23 @ 05:22) (18 - 19)  SpO2: 96% (09-02-23 @ 05:22) (94% - 98%)  Wt(kg): --  I&O's Summary      Appearance: Normal	  HEENT:   Normal oral mucosa, PERRL, EOMI	  Lymphatic: No lymphadenopathy  Cardiovascular: Normal S1 S2, No JVD, No murmurs, No edema  Respiratory: Lungs clear to auscultation	  Psychiatry: A & O x 3, Mood & affect appropriate  Gastrointestinal:  Soft, Non-tender, + BS	  Skin: No rashes, No ecchymoses, No cyanosis	  Neurologic: Non-focal  Extremities: Normal range of motion, No clubbing, cyanosis or edema  Vascular: Peripheral pulses palpable 2+ bilaterally    MEDICATIONS  (STANDING):  apixaban 5 milliGRAM(s) Oral every 12 hours  aspirin  chewable 81 milliGRAM(s) Oral daily  atorvastatin 20 milliGRAM(s) Oral at bedtime  gabapentin 100 milliGRAM(s) Oral every 8 hours  lactated ringers. 1000 milliLiter(s) (80 mL/Hr) IV Continuous <Continuous>  levETIRAcetam 750 milliGRAM(s) Oral two times a day  levothyroxine 88 MICROGram(s) Oral daily  linezolid  IVPB 600 milliGRAM(s) IV Intermittent every 12 hours  linezolid  IVPB      melatonin 5 milliGRAM(s) Oral at bedtime  oxybutynin 10 milliGRAM(s) Oral daily  pantoprazole    Tablet 40 milliGRAM(s) Oral before breakfast  piperacillin/tazobactam IVPB.. 3.375 Gram(s) IV Intermittent every 8 hours  sodium chloride 0.9%. 1000 milliLiter(s) (75 mL/Hr) IV Continuous <Continuous>        LABS:	 	                          11.5   9.21  )-----------( 524      ( 02 Sep 2023 06:58 )             35.3     09-02    137  |  106  |  27<H>  ----------------------------<  121<H>  3.5   |  22  |  0.74    Ca    8.1<L>      02 Sep 2023 06:58  Phos  2.8     09-01  Mg     1.6     09-01    TPro  6.0  /  Alb  2.1<L>  /  TBili  0.4  /  DBili  x   /  AST  8<L>  /  ALT  23  /  AlkPhos  103  08-31      Lipid Profile: Cholesterol 87  LDL --  HDL 55  TG 74  Ldl calc 17      HgA1c:   TSH: Thyroid Stimulating Hormone, Serum: 0.84 uU/mL (09-02 @ 06:58)      	      Culture - Urine (09.01.23 @ 03:10)   Specimen Source: Clean Catch Clean Catch (Midstream)  Culture Results:   >100,000 CFU/ml Gram Negative Rods< from: CT Abdomen and Pelvis w/ Oral Cont (09.01.23 @ 15:20) >  ACC: [id number] EXAM:  CT ABDOMEN AND PELVIS OC   ORDERED BY: MARQUITA MORFIN     PROCEDURE DATE:  09/01/2023          INTERPRETATION:  CLINICAL INFORMATION: Diarrhea. Status post colectomy in   early August.    COMPARISON: None.    CONTRAST/COMPLICATIONS:  IV Contrast: NONE  Oral Contrast: Omnipaque 350  Complications: None reported at time of study completion    PROCEDURE:  CT of the Abdomen and Pelvis was performed.  Sagittal and coronal reformats were performed.    FINDINGS:  LOWER CHEST: Minor atelectasis. Partially visualized left adrenal    LIVER: Fatty infiltration of liver.  BILE DUCTS: Normal caliber.  GALLBLADDER: Cholecystectomy.  SPLEEN: Within normal limits.  PANCREAS: Fatty atrophy.  ADRENALS: Within normal limits.  KIDNEYS/URETERS: 2 cm left upper pole renal cyst. Left upper pole renal   angiomyolipoma measures 1.3 cm.    BLADDER: Within normal limits.  REPRODUCTIVE ORGANS: Enlarged myomatous uterus.    BOWEL: No bowel obstruction. Status post colectomy with ileorectal   anastomosis. Fluid distends the small bowel and rectum.  PERITONEUM: No ascites.  VESSELS: Within normal limits.  RETROPERITONEUM/LYMPH NODES: No lymphadenopathy.  ABDOMINAL WALL: Postoperative changes. Subcutaneous emphysema in the   anterior abdominal wall extending from the pelvis to the lower chest.   Small fluid collection in the right lower abdominal wall measuring 3.5 x   1.9 cm, which may be postoperative.  BONES: Degenerative changes.    IMPRESSION:  Status post colectomy with ileorectal anastomosis.    Fluid throughout nonobstructed bowel and rectum, consistent with   enteritis.    Extensive subcutaneous emphysema in the anterior abdominal wall. While   findings may be postoperative given the history of recent surgery, in the   appropriate clinical context, necrotizing infection may be considered.   Careful clinical evaluation is therefore warranted.    These findings were discussed with Dr. MARQUITA MORFIN [phone] at     < end of copied text >

## 2023-09-02 NOTE — PROGRESS NOTE ADULT - SUBJECTIVE AND OBJECTIVE BOX
Patient is seen and examined at the bed side, is afebrile. The events noted regarding allergic reaction to Zosyn and emesis. The CT abd/pelvis from 9/1/23 has been reviewed. The surgery recommendation noted.      REVIEW OF SYSTEMS: All other review systems are negative      ALLERGIES: Zosyn ( Swelling of lips and itching)       Vital Signs Last 24 Hrs  T(C): 36.7 (02 Sep 2023 14:23), Max: 36.7 (01 Sep 2023 20:20)  T(F): 98 (02 Sep 2023 14:23), Max: 98 (01 Sep 2023 20:20)  HR: 86 (02 Sep 2023 14:23) (82 - 90)  BP: 109/70 (02 Sep 2023 14:23) (97/58 - 133/78)  BP(mean): --  RR: 18 (02 Sep 2023 14:23) (18 - 19)  SpO2: 97% (02 Sep 2023 14:23) (94% - 98%)    Parameters below as of 02 Sep 2023 14:23  Patient On (Oxygen Delivery Method): room air        PHYSICAL EXAM:  GENERAL: Not in distress   CHEST/LUNG: Not using accessory muscles  HEART: s1 and s2 present  ABDOMEN:  Nontender and incision site healed  EXTREMITIES: No pedal  edema  CNS: Awake and Alert      LABS:                        11.5   9.21  )-----------( 524      ( 02 Sep 2023 06:58 )             35.3                           10.6   7.36  )-----------( 495      ( 01 Sep 2023 08:24 )             32.3       09-02    137  |  106  |  27<H>  ----------------------------<  121<H>  3.5   |  22  |  0.74    Ca    8.1<L>      02 Sep 2023 06:58  Phos  2.8     09-01  Mg     1.6     09-01    TPro  6.0  /  Alb  2.1<L>  /  TBili  0.4  /  DBili  x   /  AST  8<L>  /  ALT  23  /  AlkPhos  103  08-31 09-01    137  |  106  |  35<H>  ----------------------------<  102<H>  3.5   |  22  |  0.79    Ca    7.4<L>      01 Sep 2023 08:24  Phos  2.8     09-01  Mg     1.6     09-01    TPro  6.0  /  Alb  2.1<L>  /  TBili  0.4  /  DBili  x   /  AST  8<L>  /  ALT  23  /  AlkPhos  103  08-31        Urinalysis Basic - ( 01 Sep 2023 08:24 )  Color: x / Appearance: x / SG: x / pH: x  Gluc: 102 mg/dL / Ketone: x  / Bili: x / Urobili: x   Blood: x / Protein: x / Nitrite: x   Leuk Esterase: x / RBC: x / WBC x   Sq Epi: x / Non Sq Epi: x / Bacteria: x        MEDICATIONS  (STANDING):    apixaban 5 milliGRAM(s) Oral every 12 hours  aspirin  chewable 81 milliGRAM(s) Oral daily  atorvastatin 20 milliGRAM(s) Oral at bedtime  dextrose 5% + sodium chloride 0.9%. 1000 milliLiter(s) (80 mL/Hr) IV Continuous <Continuous>  gabapentin 100 milliGRAM(s) Oral every 8 hours  levETIRAcetam 750 milliGRAM(s) Oral two times a day  levothyroxine 88 MICROGram(s) Oral daily  melatonin 5 milliGRAM(s) Oral at bedtime  oxybutynin 10 milliGRAM(s) Oral daily  pantoprazole    Tablet 40 milliGRAM(s) Oral before breakfast      RADIOLOGY & ADDITIONAL TESTS:    9/1/23 : CT Abdomen and Pelvis w/ Oral Cont (09.01.23 @ 15:20) Status post colectomy with ileorectal anastomosis.    Fluid throughout nonobstructed bowel and rectum, consistent with enteritis.    Extensive subcutaneous emphysema in the anterior abdominal wall. While findings may be postoperative given the history of recent surgery, in the   appropriate clinical context, necrotizing infection may be considered. Careful clinical evaluation is therefore warranted.    These findings were discussed with Dr. MARQUITA MORFIN [phone] at   9/1/2023 3:43 PM with readback.      8/31/23 : Xray Chest 1 View- PORTABLE-Urgent (Xray Chest 1 View- PORTABLE-Urgent .) (08.31.23 @ 23:17) >    No acute pulmonary disease.        MICROBIOLOGY DATA:    Clostridium difficile Toxin by PCR (09.02.23 @ 05:16)   C Diff by PCR Result: NotDetec:     Culture - Stool (09.01.23 @ 03:30)   Specimen Source: .Stool Feces  Culture Results:   No enteric pathogens to date: Final culture pending    GI PCR Panel Stool (09.01.23 @ 03:30)   GI PCR Panel: NotDetec:     Urine Microscopic-Add On (NC) (09.01.23 @ 03:10)   Red Blood Cell - Urine: 0 /HPF  White Blood Cell - Urine: 4 /HPF  Bacteria: Many /HPF  Comment - Urine: few amorphous urates  Squamous Epithelial Cells: PresentRapid HIV-1/2 Antibody (08.31.23 @ 21:30)   Rapid HIV-1/2 Antibody: Nonreact:                 Patient is seen and examined at the bed side, is afebrile. The events noted regarding allergic reaction to Zosyn and emesis. The CT abd/pelvis from 9/1/23 has been reviewed. The surgery recommendation noted.      REVIEW OF SYSTEMS: All other review systems are negative      ALLERGIES: Zosyn ( Swelling of lips and itching)       Vital Signs Last 24 Hrs  T(C): 36.7 (02 Sep 2023 14:23), Max: 36.7 (01 Sep 2023 20:20)  T(F): 98 (02 Sep 2023 14:23), Max: 98 (01 Sep 2023 20:20)  HR: 86 (02 Sep 2023 14:23) (82 - 90)  BP: 109/70 (02 Sep 2023 14:23) (97/58 - 133/78)  BP(mean): --  RR: 18 (02 Sep 2023 14:23) (18 - 19)  SpO2: 97% (02 Sep 2023 14:23) (94% - 98%)    Parameters below as of 02 Sep 2023 14:23  Patient On (Oxygen Delivery Method): room air        PHYSICAL EXAM:  GENERAL: Not in distress   CHEST/LUNG: Not using accessory muscles  HEART: s1 and s2 present  ABDOMEN:  Nontender and incision site healed  EXTREMITIES: No pedal  edema  CNS: Awake and Alert      LABS:                        11.5   9.21  )-----------( 524      ( 02 Sep 2023 06:58 )             35.3                           10.6   7.36  )-----------( 495      ( 01 Sep 2023 08:24 )             32.3       09-02    137  |  106  |  27<H>  ----------------------------<  121<H>  3.5   |  22  |  0.74    Ca    8.1<L>      02 Sep 2023 06:58  Phos  2.8     09-01  Mg     1.6     09-01    TPro  6.0  /  Alb  2.1<L>  /  TBili  0.4  /  DBili  x   /  AST  8<L>  /  ALT  23  /  AlkPhos  103  08-31 09-01    137  |  106  |  35<H>  ----------------------------<  102<H>  3.5   |  22  |  0.79    Ca    7.4<L>      01 Sep 2023 08:24  Phos  2.8     09-01  Mg     1.6     09-01    TPro  6.0  /  Alb  2.1<L>  /  TBili  0.4  /  DBili  x   /  AST  8<L>  /  ALT  23  /  AlkPhos  103  08-31        Urinalysis Basic - ( 01 Sep 2023 08:24 )  Color: x / Appearance: x / SG: x / pH: x  Gluc: 102 mg/dL / Ketone: x  / Bili: x / Urobili: x   Blood: x / Protein: x / Nitrite: x   Leuk Esterase: x / RBC: x / WBC x   Sq Epi: x / Non Sq Epi: x / Bacteria: x        MEDICATIONS  (STANDING):    apixaban 5 milliGRAM(s) Oral every 12 hours  aspirin  chewable 81 milliGRAM(s) Oral daily  atorvastatin 20 milliGRAM(s) Oral at bedtime  dextrose 5% + sodium chloride 0.9%. 1000 milliLiter(s) (80 mL/Hr) IV Continuous <Continuous>  gabapentin 100 milliGRAM(s) Oral every 8 hours  levETIRAcetam 750 milliGRAM(s) Oral two times a day  levothyroxine 88 MICROGram(s) Oral daily  melatonin 5 milliGRAM(s) Oral at bedtime  oxybutynin 10 milliGRAM(s) Oral daily  pantoprazole    Tablet 40 milliGRAM(s) Oral before breakfast      RADIOLOGY & ADDITIONAL TESTS:    9/1/23 : CT Abdomen and Pelvis w/ Oral Cont (09.01.23 @ 15:20) Status post colectomy with ileorectal anastomosis.    Fluid throughout nonobstructed bowel and rectum, consistent with enteritis.    Extensive subcutaneous emphysema in the anterior abdominal wall. While findings may be postoperative given the history of recent surgery, in the   appropriate clinical context, necrotizing infection may be considered. Careful clinical evaluation is therefore warranted.    These findings were discussed with Dr. MARQUITA MORFIN [phone] at   9/1/2023 3:43 PM with readback.      8/31/23 : Xray Chest 1 View- PORTABLE-Urgent (Xray Chest 1 View- PORTABLE-Urgent .) (08.31.23 @ 23:17) >    No acute pulmonary disease.        MICROBIOLOGY DATA:    Clostridium difficile Toxin by PCR (09.02.23 @ 05:16)   C Diff by PCR Result: NotDetec:     Culture - Stool (09.01.23 @ 03:30)   Specimen Source: .Stool Feces  Culture Results:   No enteric pathogens to date: Final culture pending    GI PCR Panel Stool (09.01.23 @ 03:30)   GI PCR Panel: NotDetec:     Urine Microscopic-Add On (NC) (09.01.23 @ 03:10)   Red Blood Cell - Urine: 0 /HPF  White Blood Cell - Urine: 4 /HPF  Bacteria: Many /HPF  Comment - Urine: few amorphous urates  Squamous Epithelial Cells: PresentRapid HIV-1/2 Antibody (08.31.23 @ 21:30)   Rapid HIV-1/2 Antibody: Nonreact:                 Patient is seen and examined at the bed side, is afebrile. The events noted regarding allergic reaction to Zosyn and emesis. The CT abd/pelvis from 9/1/23 has been reviewed. The surgery recommendation noted.      REVIEW OF SYSTEMS: All other review systems are negative      ALLERGIES: Zosyn ( Swelling of lips and itching)       Vital Signs Last 24 Hrs  T(C): 36.7 (02 Sep 2023 14:23), Max: 36.7 (01 Sep 2023 20:20)  T(F): 98 (02 Sep 2023 14:23), Max: 98 (01 Sep 2023 20:20)  HR: 86 (02 Sep 2023 14:23) (82 - 90)  BP: 109/70 (02 Sep 2023 14:23) (97/58 - 133/78)  BP(mean): --  RR: 18 (02 Sep 2023 14:23) (18 - 19)  SpO2: 97% (02 Sep 2023 14:23) (94% - 98%)    Parameters below as of 02 Sep 2023 14:23  Patient On (Oxygen Delivery Method): room air        PHYSICAL EXAM:  GENERAL: Not in distress   CHEST/LUNG: Not using accessory muscles  HEART: s1 and s2 present  ABDOMEN:  Nontender and incision site healed  EXTREMITIES: No pedal  edema  CNS: Awake and Alert      LABS:                        11.5   9.21  )-----------( 524      ( 02 Sep 2023 06:58 )             35.3                           10.6   7.36  )-----------( 495      ( 01 Sep 2023 08:24 )             32.3       09-02    137  |  106  |  27<H>  ----------------------------<  121<H>  3.5   |  22  |  0.74    Ca    8.1<L>      02 Sep 2023 06:58  Phos  2.8     09-01  Mg     1.6     09-01    TPro  6.0  /  Alb  2.1<L>  /  TBili  0.4  /  DBili  x   /  AST  8<L>  /  ALT  23  /  AlkPhos  103  08-31 09-01    137  |  106  |  35<H>  ----------------------------<  102<H>  3.5   |  22  |  0.79    Ca    7.4<L>      01 Sep 2023 08:24  Phos  2.8     09-01  Mg     1.6     09-01    TPro  6.0  /  Alb  2.1<L>  /  TBili  0.4  /  DBili  x   /  AST  8<L>  /  ALT  23  /  AlkPhos  103  08-31        Urinalysis Basic - ( 01 Sep 2023 08:24 )  Color: x / Appearance: x / SG: x / pH: x  Gluc: 102 mg/dL / Ketone: x  / Bili: x / Urobili: x   Blood: x / Protein: x / Nitrite: x   Leuk Esterase: x / RBC: x / WBC x   Sq Epi: x / Non Sq Epi: x / Bacteria: x        MEDICATIONS  (STANDING):    apixaban 5 milliGRAM(s) Oral every 12 hours  aspirin  chewable 81 milliGRAM(s) Oral daily  atorvastatin 20 milliGRAM(s) Oral at bedtime  dextrose 5% + sodium chloride 0.9%. 1000 milliLiter(s) (80 mL/Hr) IV Continuous <Continuous>  gabapentin 100 milliGRAM(s) Oral every 8 hours  levETIRAcetam 750 milliGRAM(s) Oral two times a day  levothyroxine 88 MICROGram(s) Oral daily  melatonin 5 milliGRAM(s) Oral at bedtime  oxybutynin 10 milliGRAM(s) Oral daily  pantoprazole    Tablet 40 milliGRAM(s) Oral before breakfast      RADIOLOGY & ADDITIONAL TESTS:    9/1/23 : CT Abdomen and Pelvis w/ Oral Cont (09.01.23 @ 15:20) Status post colectomy with ileorectal anastomosis.    Fluid throughout nonobstructed bowel and rectum, consistent with enteritis.    Extensive subcutaneous emphysema in the anterior abdominal wall. While findings may be postoperative given the history of recent surgery, in the   appropriate clinical context, necrotizing infection may be considered. Careful clinical evaluation is therefore warranted.    These findings were discussed with Dr. MARQUITA MORFIN [phone] at   9/1/2023 3:43 PM with readback.      8/31/23 : Xray Chest 1 View- PORTABLE-Urgent (Xray Chest 1 View- PORTABLE-Urgent .) (08.31.23 @ 23:17) >    No acute pulmonary disease.        MICROBIOLOGY DATA:    Clostridium difficile Toxin by PCR (09.02.23 @ 05:16)   C Diff by PCR Result: NotDetec:     Culture - Stool (09.01.23 @ 03:30)   Specimen Source: .Stool Feces  Culture Results:   No enteric pathogens to date: Final culture pending    GI PCR Panel Stool (09.01.23 @ 03:30)   GI PCR Panel: NotDetec:     Urine Microscopic-Add On (NC) (09.01.23 @ 03:10)   Red Blood Cell - Urine: 0 /HPF  White Blood Cell - Urine: 4 /HPF  Bacteria: Many /HPF  Comment - Urine: few amorphous urates  Squamous Epithelial Cells: PresentRapid HIV-1/2 Antibody (08.31.23 @ 21:30)   Rapid HIV-1/2 Antibody: Nonreact:

## 2023-09-03 LAB
-  AMIKACIN: SIGNIFICANT CHANGE UP
-  AMOXICILLIN/CLAVULANIC ACID: SIGNIFICANT CHANGE UP
-  AMPICILLIN/SULBACTAM: SIGNIFICANT CHANGE UP
-  AMPICILLIN: SIGNIFICANT CHANGE UP
-  AZTREONAM: SIGNIFICANT CHANGE UP
-  CEFAZOLIN: SIGNIFICANT CHANGE UP
-  CEFEPIME: SIGNIFICANT CHANGE UP
-  CEFOXITIN: SIGNIFICANT CHANGE UP
-  CEFTRIAXONE: SIGNIFICANT CHANGE UP
-  CIPROFLOXACIN: SIGNIFICANT CHANGE UP
-  ERTAPENEM: SIGNIFICANT CHANGE UP
-  GENTAMICIN: SIGNIFICANT CHANGE UP
-  IMIPENEM: SIGNIFICANT CHANGE UP
-  LEVOFLOXACIN: SIGNIFICANT CHANGE UP
-  MEROPENEM: SIGNIFICANT CHANGE UP
-  NITROFURANTOIN: SIGNIFICANT CHANGE UP
-  PIPERACILLIN/TAZOBACTAM: SIGNIFICANT CHANGE UP
-  TOBRAMYCIN: SIGNIFICANT CHANGE UP
-  TRIMETHOPRIM/SULFAMETHOXAZOLE: SIGNIFICANT CHANGE UP
ANION GAP SERPL CALC-SCNC: 9 MMOL/L — SIGNIFICANT CHANGE UP (ref 5–17)
ANISOCYTOSIS BLD QL: SLIGHT — SIGNIFICANT CHANGE UP
BASOPHILS # BLD AUTO: 0 K/UL — SIGNIFICANT CHANGE UP (ref 0–0.2)
BASOPHILS NFR BLD AUTO: 0 % — SIGNIFICANT CHANGE UP (ref 0–2)
BLASTS # FLD: 2 % — HIGH (ref 0–0)
BUN SERPL-MCNC: 15 MG/DL — SIGNIFICANT CHANGE UP (ref 7–18)
CALCIUM SERPL-MCNC: 9.1 MG/DL — SIGNIFICANT CHANGE UP (ref 8.4–10.5)
CHLORIDE SERPL-SCNC: 107 MMOL/L — SIGNIFICANT CHANGE UP (ref 96–108)
CO2 SERPL-SCNC: 23 MMOL/L — SIGNIFICANT CHANGE UP (ref 22–31)
CREAT SERPL-MCNC: 0.75 MG/DL — SIGNIFICANT CHANGE UP (ref 0.5–1.3)
CULTURE RESULTS: SIGNIFICANT CHANGE UP
EGFR: 81 ML/MIN/1.73M2 — SIGNIFICANT CHANGE UP
EOSINOPHIL # BLD AUTO: 0 K/UL — SIGNIFICANT CHANGE UP (ref 0–0.5)
EOSINOPHIL NFR BLD AUTO: 0 % — SIGNIFICANT CHANGE UP (ref 0–6)
GLUCOSE SERPL-MCNC: 140 MG/DL — HIGH (ref 70–99)
HCT VFR BLD CALC: 36.9 % — SIGNIFICANT CHANGE UP (ref 34.5–45)
HGB BLD-MCNC: 12 G/DL — SIGNIFICANT CHANGE UP (ref 11.5–15.5)
HYPOCHROMIA BLD QL: SLIGHT — SIGNIFICANT CHANGE UP
LYMPHOCYTES # BLD AUTO: 2.42 K/UL — SIGNIFICANT CHANGE UP (ref 1–3.3)
LYMPHOCYTES # BLD AUTO: 21 % — SIGNIFICANT CHANGE UP (ref 13–44)
MACROCYTES BLD QL: SLIGHT — SIGNIFICANT CHANGE UP
MANUAL SMEAR VERIFICATION: SIGNIFICANT CHANGE UP
MCHC RBC-ENTMCNC: 28 PG — SIGNIFICANT CHANGE UP (ref 27–34)
MCHC RBC-ENTMCNC: 32.5 GM/DL — SIGNIFICANT CHANGE UP (ref 32–36)
MCV RBC AUTO: 86 FL — SIGNIFICANT CHANGE UP (ref 80–100)
METHOD TYPE: SIGNIFICANT CHANGE UP
MICROCYTES BLD QL: SLIGHT — SIGNIFICANT CHANGE UP
MONOCYTES # BLD AUTO: 0.81 K/UL — SIGNIFICANT CHANGE UP (ref 0–0.9)
MONOCYTES NFR BLD AUTO: 7 % — SIGNIFICANT CHANGE UP (ref 2–14)
NEUTROPHILS # BLD AUTO: 7.04 K/UL — SIGNIFICANT CHANGE UP (ref 1.8–7.4)
NEUTROPHILS NFR BLD AUTO: 56 % — SIGNIFICANT CHANGE UP (ref 43–77)
NEUTS BAND # BLD: 5 % — SIGNIFICANT CHANGE UP (ref 0–8)
NRBC # BLD: 0 /100 — SIGNIFICANT CHANGE UP (ref 0–0)
ORGANISM # SPEC MICROSCOPIC CNT: SIGNIFICANT CHANGE UP
OVALOCYTES BLD QL SMEAR: SLIGHT — SIGNIFICANT CHANGE UP
PLAT MORPH BLD: NORMAL — SIGNIFICANT CHANGE UP
PLATELET # BLD AUTO: 660 K/UL — HIGH (ref 150–400)
PLATELET COUNT - ESTIMATE: ABNORMAL
POIKILOCYTOSIS BLD QL AUTO: SLIGHT — SIGNIFICANT CHANGE UP
POLYCHROMASIA BLD QL SMEAR: SLIGHT — SIGNIFICANT CHANGE UP
POTASSIUM SERPL-MCNC: 3.1 MMOL/L — LOW (ref 3.5–5.3)
POTASSIUM SERPL-SCNC: 3.1 MMOL/L — LOW (ref 3.5–5.3)
PROMYELOCYTES # FLD: 1 % — HIGH (ref 0–0)
RBC # BLD: 4.29 M/UL — SIGNIFICANT CHANGE UP (ref 3.8–5.2)
RBC # FLD: 15.5 % — HIGH (ref 10.3–14.5)
RBC BLD AUTO: ABNORMAL
SODIUM SERPL-SCNC: 139 MMOL/L — SIGNIFICANT CHANGE UP (ref 135–145)
SPECIMEN SOURCE: SIGNIFICANT CHANGE UP
VARIANT LYMPHS # BLD: 8 % — HIGH (ref 0–6)
WBC # BLD: 11.54 K/UL — HIGH (ref 3.8–10.5)
WBC # FLD AUTO: 11.54 K/UL — HIGH (ref 3.8–10.5)

## 2023-09-03 PROCEDURE — 74018 RADEX ABDOMEN 1 VIEW: CPT | Mod: 26

## 2023-09-03 RX ORDER — POTASSIUM CHLORIDE 20 MEQ
10 PACKET (EA) ORAL
Refills: 0 | Status: COMPLETED | OUTPATIENT
Start: 2023-09-03 | End: 2023-09-03

## 2023-09-03 RX ADMIN — SODIUM CHLORIDE 80 MILLILITER(S): 9 INJECTION, SOLUTION INTRAVENOUS at 21:38

## 2023-09-03 RX ADMIN — Medication 100 MILLIEQUIVALENT(S): at 12:01

## 2023-09-03 RX ADMIN — ONDANSETRON 4 MILLIGRAM(S): 8 TABLET, FILM COATED ORAL at 06:17

## 2023-09-03 RX ADMIN — Medication 100 MILLIEQUIVALENT(S): at 13:36

## 2023-09-03 RX ADMIN — Medication 0.25 MILLIGRAM(S): at 10:28

## 2023-09-03 RX ADMIN — Medication 100 MILLIEQUIVALENT(S): at 10:28

## 2023-09-03 RX ADMIN — SODIUM CHLORIDE 80 MILLILITER(S): 9 INJECTION, SOLUTION INTRAVENOUS at 06:16

## 2023-09-03 NOTE — PROGRESS NOTE ADULT - ASSESSMENT
Patient is a 78y old  Female with PMH of transverse colon neoplasm s/p colectomy in [facility] in early August, HTN, HLD, CAD s/p LHC with TALI to RCA, Atrial fibrillation s/p watchman implant (3/23), hypothyroidism, PUJA (not on cpap), TIA, now presents to the ER for evaluation of weak, tired, lightheaded and low blood pressure, in the low 80s. Patient reports that since her surgery earlier in August she is not eating much at all and  has had diarrhea. She used to have 5-6 episodes of loose watery bowel movements every day and now have improved to 2-3 bowel movements daily. She has no fever or chills. ON Admission, she found to have no fever but Low Blood pressure, BP of 93/62. The stool studies are in process. The ID consult requested to assist with further evaluation and antibiotic management.     # Hypotension - resolved  # R/O Infectious etiology of diarrhea- C.difficile PCR and GI pathogen PCR is negative   #Enteritis and Necrotizing  fasciitis - CT abd/pelvis shows " Extensive subcutaneous emphysema in the anterior abdominal wall. While findings may be postoperative given the history of recent surgery, in the appropriate clinical context, necrotizing infection may be considered."  - As per surgery " Extensive subcutaneous emphysema in the anterior abdominal wall most likely related to recent surgery, necrotizing fascitis very unlikely."     would recommend:    1.  Abdominal  Xray to reassess  2.  NPO, IVF with D5  3. Monitor oFF Abx since Surgery recommended "  Extensive subcutaneous emphysema in the anterior abdominal wall most likely related to recent surgery, necrotizing fascitis very unlikely."   4. Antiemetic agents     d/w Nursing staff       Attending Attestation:    Spent more than 35 minutes on total encounter, more than 50 % of the visit was spent counseling and/or coordinating care by the Attending physician.             Patient is a 78y old  Female with PMH of transverse colon neoplasm s/p colectomy in [facility] in early August, HTN, HLD, CAD s/p LHC with TALI to RCA, Atrial fibrillation s/p watchman implant (3/23), hypothyroidism, PUJA (not on cpap), TIA, now presents to the ER for evaluation of weak, tired, lightheaded and low blood pressure, in the low 80s. Patient reports that since her surgery earlier in August she is not eating much at all and  has had diarrhea. She used to have 5-6 episodes of loose watery bowel movements every day and now have improved to 2-3 bowel movements daily. She has no fever or chills. ON Admission, she found to have no fever but Low Blood pressure, BP of 93/62. The stool studies are in process. The ID consult requested to assist with further evaluation and antibiotic management.     # Hypotension - resolved  # R/O Infectious etiology of diarrhea- C.difficile PCR and GI pathogen PCR is negative   #Enteritis and Necrotizing  fasciitis - CT abd/pelvis shows " Extensive subcutaneous emphysema in the anterior abdominal wall. While findings may be postoperative given the history of recent surgery, in the appropriate clinical context, necrotizing infection may be considered."  - As per surgery " Extensive subcutaneous emphysema in the anterior abdominal wall most likely related to recent surgery, necrotizing fascitis very unlikely."     would recommend:    1.  Abdominal  Xray to reassess  2.  NPO, IVF with D5  3. Monitor oFF Abx since Surgery recommended "  Extensive subcutaneous emphysema in the anterior abdominal wall most likely related to recent surgery, necrotizing fascitis very unlikely."   4. Antiemetic agents     d/w Nursing staff       Attending Attestation:    Spent more than 35 minutes on total encounter, more than 50 % of the visit was spent counseling and/or coordinating care by the Attending physician.             Patient is a 78y old  Female with PMH of transverse colon neoplasm s/p colectomy in [facility] in early August, HTN, HLD, CAD s/p LHC with TALI to RCA, Atrial fibrillation s/p watchman implant (3/23), hypothyroidism, PUJA (not on cpap), TIA, now presents to the ER for evaluation of weak, tired, lightheaded and low blood pressure, in the low 80s. Patient reports that since her surgery earlier in August she is not eating much at all and  has had diarrhea. She used to have 5-6 episodes of loose watery bowel movements every day and now have improved to 2-3 bowel movements daily. She has no fever or chills. ON Admission, she found to have no fever but Low Blood pressure, BP of 93/62. The stool studies are in process. The ID consult requested to assist with further evaluation and antibiotic management.     # Hypotension - resolved  # R/O Infectious etiology of diarrhea- C.difficile PCR and GI pathogen PCR is negative   #Enteritis and Necrotizing  fasciitis - CT abd/pelvis shows " Extensive subcutaneous emphysema in the anterior abdominal wall. While findings may be postoperative given the history of recent surgery, in the appropriate clinical context, necrotizing infection may be considered."  - As per surgery " Extensive subcutaneous emphysema in the anterior abdominal wall most likely related to recent surgery, necrotizing fascitis very unlikely."     would recommend:    1.  Abdominal  Xray to reassess  2.  NPO, IVF with D5  3. Monitor oFF Abx since Surgery recommended "  Extensive subcutaneous emphysema in the anterior abdominal wall most likely related to recent surgery, necrotizing fascitis very unlikely."   4. Antiemetic agents     d/w Nursing staff       Attending Attestation:    Spent more than 35 minutes on total encounter, more than 50 % of the visit was spent counseling and/or coordinating care by the Attending physician.

## 2023-09-03 NOTE — PROGRESS NOTE ADULT - SUBJECTIVE AND OBJECTIVE BOX
[   ] ICU                                          [   ] CCU                                      [ X  ] Medical Floor      Patient is a 78 year old female with persistent diarrhea. GI consulted to evaluate.         Patient is a 78 year old female with past medical history significant for transverse colon neoplasm s/p colectomy in [facility] in early August 2023, HTN, HLD, CAD s/p LHC with TALI to RCA, Atrial fibrillation s/p watchman implant (3/23), hypothyroidism, PUJA, and TIA presented to the emergency room with 2 days history of lightheaded and feels lethargic. Patient reports when her blood pressure was checked in the facility it was in the low 80s. Patient reports that since her surgery earlier in August she is not eating much at all. She reports that since the surgery she has had diarrhea. Patient reports she used to have 5-6 episodes of loose watery bowel movements every day and now have improved to 2-3 bowel movements daily. Patient reports due to fear of worsening diarrhea she has decreased diet and oral intake. Patient reports she only takes soup and anything heavy makes her diarrhea worse associated with intermittent, diffuse, non radiating, 3/10 intensity, crampy abdominal pain. . Patient denies nausea, vomiting, hematemesis, hematochezia, melena, fever, chills, chest pain, SOB, palpitation, cough, hematuria, dysuria, recent traveling.        Patient is comfortable. No new complaints reported, No abdominal pain, N/V, hematemesis, hematochezia, melena, fever, chills, chest pain, SOB, cough or diarrhea reported.      PAIN MANAGEMENT:  Pain Scale:                 3/10  Pain Location:  Diffuse crampy abdominal pain       PAST MEDICAL HISTORY    Atrial fibrillation    Hyperlipidemia    Hypertension    CAD (coronary artery disease)    Colon cancer    Hypothyroidism    Obstructive sleep apnea    Transient ischemic attack (TIA)             PAST SURGICAL HISTORY     Colectomy        Allergies    No Known Allergies    Intolerances  None       SOCIAL HISTORY  Advanced Directives:       [ X ] Full Code       [  ] DNR  Marital Status:         [  ] M      [ X ] S      [  ] D       [  ] W  Children:       [ X ] Yes      [  ] No  Occupation:        [  ] Employed       [ X ] Unemployed       [  ] Retired  Diet:       [ X ] Regular       [  ] PEG feeding          [  ] NG tube feeding  Drug Use:           [  ] Patient denied          [  ] Yes  Alcohol:           [ X ] No             [  ] Yes (socially)         [  ] Yes (chronic)  Tobacco:           [  ] Yes           [ X ] No      FAMILY HISTORY  [ X ] Heart Disease            [ X ] Diabetes             [ X ] HTN             [  ] Colon Cancer             [  ] Stomach Cancer              [  ] Pancreatic Cancer        VITALS  Vital Signs Last 24 Hrs  T(C): 37.1 (03 Sep 2023 05:40), Max: 37.1 (03 Sep 2023 05:40)  T(F): 98.7 (03 Sep 2023 05:40), Max: 98.7 (03 Sep 2023 05:40)  HR: 84 (03 Sep 2023 05:40) (84 - 90)  BP: 120/71 (03 Sep 2023 05:40) (107/74 - 120/71)   RR: 17 (03 Sep 2023 05:40) (16 - 18)  SpO2: 94% (03 Sep 2023 05:40) (94% - 97%)  Parameters below as of 03 Sep 2023 05:40  Patient On (Oxygen Delivery Method): room air       MEDICATIONS  (STANDING):  apixaban 5 milliGRAM(s) Oral every 12 hours  aspirin  chewable 81 milliGRAM(s) Oral daily  atorvastatin 20 milliGRAM(s) Oral at bedtime  dextrose 5% + sodium chloride 0.9%. 1000 milliLiter(s) (80 mL/Hr) IV Continuous <Continuous>  gabapentin 100 milliGRAM(s) Oral every 8 hours  levETIRAcetam 750 milliGRAM(s) Oral two times a day  levothyroxine 88 MICROGram(s) Oral daily  melatonin 5 milliGRAM(s) Oral at bedtime  oxybutynin 10 milliGRAM(s) Oral daily  pantoprazole    Tablet 40 milliGRAM(s) Oral before breakfast  potassium chloride  10 mEq/100 mL IVPB 10 milliEquivalent(s) IV Intermittent every 1 hour    MEDICATIONS  (PRN):  acetaminophen     Tablet .. 650 milliGRAM(s) Oral every 6 hours PRN Temp greater or equal to 38C (100.4F), Mild Pain (1 - 3)  albuterol    90 MICROgram(s) HFA Inhaler 2 Puff(s) Inhalation every 6 hours PRN Shortness of Breath and/or Wheezing  ALPRAZolam 0.25 milliGRAM(s) Oral two times a day PRN Anxiety/insomnia  metoclopramide Injectable 10 milliGRAM(s) IV Push every 8 hours PRN nausea, vomiting  ondansetron Injectable 4 milliGRAM(s) IV Push every 6 hours PRN Nausea and/or Vomiting  simethicone 80 milliGRAM(s) Chew four times a day PRN Gas                            12.0   11.54 )-----------( 660      ( 03 Sep 2023 07:43 )             36.9       09-03    139  |  107  |  15  ----------------------------<  140<H>  3.1<L>   |  23  |  0.75    Ca    9.1      03 Sep 2023 07:43

## 2023-09-03 NOTE — PROGRESS NOTE ADULT - SUBJECTIVE AND OBJECTIVE BOX
Date of Service 09-03-23 @ 12:08    CHIEF COMPLAINT:Patient is a 78y old  Female who presents with a chief complaint of weakness,hypotension.Pt has had no further vomiting.    	  REVIEW OF SYSTEMS:  CONSTITUTIONAL: No fever, weight loss, or fatigue  EYES: No eye pain, visual disturbances, or discharge  ENT:  No difficulty hearing, tinnitus, vertigo; No sinus or throat pain  NECK: No pain or stiffness  RESPIRATORY: No cough, wheezing, chills or hemoptysis; No Shortness of Breath  CARDIOVASCULAR: No chest pain, palpitations, passing out, dizziness, or leg swelling  GASTROINTESTINAL: No abdominal or epigastric pain. No nausea, vomiting, or hematemesis; No diarrhea or constipation. No melena or hematochezia.  GENITOURINARY: No dysuria, frequency, hematuria, or incontinence  NEUROLOGICAL: No headaches, memory loss, loss of strength, numbness, or tremors  SKIN: No itching, burning, rashes, or lesions   LYMPH Nodes: No enlarged glands  ENDOCRINE: No heat or cold intolerance; No hair loss  MUSCULOSKELETAL: No joint pain or swelling; No muscle, back, or extremity pain  PSYCHIATRIC: No depression, anxiety, mood swings, or difficulty sleeping  HEME/LYMPH: No easy bruising, or bleeding gums  ALLERGY AND IMMUNOLOGIC: No hives or eczema	      PHYSICAL EXAM:  T(C): 37.1 (09-03-23 @ 05:40), Max: 37.1 (09-03-23 @ 05:40)  HR: 84 (09-03-23 @ 05:40) (84 - 90)  BP: 120/71 (09-03-23 @ 05:40) (107/74 - 120/71)  RR: 17 (09-03-23 @ 05:40) (16 - 18)  SpO2: 94% (09-03-23 @ 05:40) (94% - 97%)  Wt(kg): --  I&O's Summary      Appearance: Normal	  HEENT:   Normal oral mucosa, PERRL, EOMI	  Lymphatic: No lymphadenopathy  Cardiovascular: Normal S1 S2, No JVD, No murmurs, No edema  Respiratory: Lungs clear to auscultation	  Psychiatry: A & O x 3, Mood & affect appropriate  Gastrointestinal:  Soft, Non-tender, + BS	  Skin: No rashes, No ecchymoses, No cyanosis	  Neurologic: Non-focal  Extremities: Normal range of motion, No clubbing, cyanosis or edema  Vascular: Peripheral pulses palpable 2+ bilaterally    MEDICATIONS  (STANDING):  apixaban 5 milliGRAM(s) Oral every 12 hours  aspirin  chewable 81 milliGRAM(s) Oral daily  atorvastatin 20 milliGRAM(s) Oral at bedtime  dextrose 5% + sodium chloride 0.9%. 1000 milliLiter(s) (80 mL/Hr) IV Continuous <Continuous>  gabapentin 100 milliGRAM(s) Oral every 8 hours  levETIRAcetam 750 milliGRAM(s) Oral two times a day  levothyroxine 88 MICROGram(s) Oral daily  melatonin 5 milliGRAM(s) Oral at bedtime  oxybutynin 10 milliGRAM(s) Oral daily  pantoprazole    Tablet 40 milliGRAM(s) Oral before breakfast  potassium chloride  10 mEq/100 mL IVPB 10 milliEquivalent(s) IV Intermittent every 1 hour    	  	  LABS:	 	                          12.0   11.54 )-----------( 660      ( 03 Sep 2023 07:43 )             36.9     09-03    139  |  107  |  15  ----------------------------<  140<H>  3.1<L>   |  23  |  0.75    Ca    9.1      03 Sep 2023 07:43      proBNP:   Lipid Profile: Cholesterol 87  LDL --  HDL 55  TG 74  Ldl calc 17  Ratio --    HgA1c:   TSH: Thyroid Stimulating Hormone, Serum: 0.84 uU/mL (09-02 @ 06:58)

## 2023-09-03 NOTE — DIETITIAN INITIAL EVALUATION ADULT - OTHER INFO
Pt visited. Pt C/O sleepy. Pt able to Provided limited Information. Per Pt HT 66 inches, can not recall UBW. Per Pt  Usually  Good Apetite PTA. But  Per  H & P Since after Surgery in Early August - S/P Colectomy Decrease appetite and was drinking soup, light Food. Diarrhea 5-6 x / day then decrease to 2-3 times /day. Bed scale 197 Lb. Pt is NPO at present D/T C/O Vomiting Projectile 9/2 @ night. Pt with Enteritis & Necrotizing Fascitis.

## 2023-09-03 NOTE — DIETITIAN INITIAL EVALUATION ADULT - NSFNSPHYEXAMSKINFT_GEN_A_CORE
Pressure Injury 1: coccyx, Stage I  Pressure Injury 2: Bilateral:, heel, Stage I  Pressure Injury 3: none, none  Pressure Injury 4: none, none  Pressure Injury 5: none, none  Pressure Injury 6: none, none  Pressure Injury 7: none, none  Pressure Injury 8: none, none  Pressure Injury 9: none, none  Pressure Injury 10: none, none  Pressure Injury 11: none, none, Pressure Injury 1: coccyx, Stage I  Pressure Injury 2: Bilateral:, heel, Stage I  Pressure Injury 3: none, none  Pressure Injury 4: none, none  Pressure Injury 5: none, none  Pressure Injury 6: none, none  Pressure Injury 7: none, none  Pressure Injury 8: none, none  Pressure Injury 9: none, none  Pressure Injury 10: none, none  Pressure Injury 11: none, none

## 2023-09-03 NOTE — DIETITIAN INITIAL EVALUATION ADULT - PERTINENT MEDS FT
MEDICATIONS  (STANDING):  apixaban 5 milliGRAM(s) Oral every 12 hours  aspirin  chewable 81 milliGRAM(s) Oral daily  atorvastatin 20 milliGRAM(s) Oral at bedtime  dextrose 5% + sodium chloride 0.9%. 1000 milliLiter(s) (80 mL/Hr) IV Continuous <Continuous>  gabapentin 100 milliGRAM(s) Oral every 8 hours  levETIRAcetam 750 milliGRAM(s) Oral two times a day  levothyroxine 88 MICROGram(s) Oral daily  melatonin 5 milliGRAM(s) Oral at bedtime  oxybutynin 10 milliGRAM(s) Oral daily  pantoprazole    Tablet 40 milliGRAM(s) Oral before breakfast  potassium chloride  10 mEq/100 mL IVPB 10 milliEquivalent(s) IV Intermittent every 1 hour    MEDICATIONS  (PRN):  acetaminophen     Tablet .. 650 milliGRAM(s) Oral every 6 hours PRN Temp greater or equal to 38C (100.4F), Mild Pain (1 - 3)  albuterol    90 MICROgram(s) HFA Inhaler 2 Puff(s) Inhalation every 6 hours PRN Shortness of Breath and/or Wheezing  ALPRAZolam 0.25 milliGRAM(s) Oral two times a day PRN Anxiety/insomnia  metoclopramide Injectable 10 milliGRAM(s) IV Push every 8 hours PRN nausea, vomiting  ondansetron Injectable 4 milliGRAM(s) IV Push every 6 hours PRN Nausea and/or Vomiting  simethicone 80 milliGRAM(s) Chew four times a day PRN Gas

## 2023-09-03 NOTE — PROGRESS NOTE ADULT - SUBJECTIVE AND OBJECTIVE BOX
INTERVAL HPI/OVERNIGHT EVENTS:  Patient seen and examined at bedside.    Pt resting comfortably. No acute complaints.   Admits to 2 episodes of NBNB vomiting today. Currently denies Nausea.   Admits to passing flatus and BM this morning.   Patient denies chest pain, shortness of breath, fever, chills.     Vital Signs Last 24 Hrs  T(C): 36.8 (03 Sep 2023 12:55), Max: 37.1 (03 Sep 2023 05:40)  T(F): 98.2 (03 Sep 2023 12:55), Max: 98.7 (03 Sep 2023 05:40)  HR: 88 (03 Sep 2023 12:55) (84 - 90)  BP: 120/83 (03 Sep 2023 12:55) (107/74 - 120/83)  BP(mean): --  RR: 20 (03 Sep 2023 12:55) (16 - 20)  SpO2: 95% (03 Sep 2023 12:55) (94% - 95%)    Parameters below as of 03 Sep 2023 12:55  Patient On (Oxygen Delivery Method): room air        Physical:  General: A&Ox3. NAD.  Respiratory: non labored  Skin: warm and dry   Abdomen: Soft, nondistended, nontender,  incisions healing well, no active drainage   Extremities: non edematous, no calf pain bilaterally    I&O's Detail      LABS:                        12.0   11.54 )-----------( 660      ( 03 Sep 2023 07:43 )             36.9             09-03    139  |  107  |  15  ----------------------------<  140<H>  3.1<L>   |  23  |  0.75    Ca    9.1      03 Sep 2023 07:43

## 2023-09-03 NOTE — PROGRESS NOTE ADULT - ASSESSMENT
77 y/o Female w/ Extensive subcutaneous emphysema in the anterior abdominal wall; hx of Robotic Colon Resection  Afebrile, VSS, wbc 11.54    CT abd/pelvis images reviewed with Dr. Celeste, Extensive subcutaneous emphysema in the anterior abdominal wall most likely related to recent surgery, necrotizing fascitis very unlikely.     -Serial abdominal exams/ observation   -No acute surgical intervention indicated at present time   -No role of abx tx   - remainder of care as per primary team  - surgery will follow   - discussed and agreed with Dr. Celeste      79 y/o Female w/ Extensive subcutaneous emphysema in the anterior abdominal wall; hx of Robotic Colon Resection  Afebrile, VSS, wbc 11.54    CT abd/pelvis images reviewed with Dr. Celeste, Extensive subcutaneous emphysema in the anterior abdominal wall most likely related to recent surgery, necrotizing fascitis very unlikely.     -Serial abdominal exams/ observation   -No acute surgical intervention indicated at present time   -No role of abx tx   - remainder of care as per primary team  - surgery will follow   - discussed and agreed with Dr. Celeste

## 2023-09-03 NOTE — DIETITIAN INITIAL EVALUATION ADULT - PROBLEM SELECTOR PLAN 2
P/w diarrhea since early august s/p colectomy surgery.  Patient on imodium TID at nursing home.   Hold imodium to see stool frequency without medication.   F/U GI PCR, ova and parasite, and stool culture.   C/W IV fluids.   C/w full liquid diet. Advance as tolerated.  ID consulted - Dr Monreal

## 2023-09-03 NOTE — PROGRESS NOTE ADULT - SUBJECTIVE AND OBJECTIVE BOX
Patient is seen and examined at the bed side, is afebrile and Blood pressure within normal limit.   She remains NPO, refusing NGT.      REVIEW OF SYSTEMS: All other review systems are negative      ALLERGIES: Zosyn ( Swelling of lips and itching)       Vital Signs Last 24 Hrs  T(C): 36.8 (03 Sep 2023 12:55), Max: 37.1 (03 Sep 2023 05:40)  T(F): 98.2 (03 Sep 2023 12:55), Max: 98.7 (03 Sep 2023 05:40)  HR: 88 (03 Sep 2023 12:55) (84 - 90)  BP: 120/83 (03 Sep 2023 12:55) (107/74 - 120/83)  BP(mean): --  RR: 20 (03 Sep 2023 12:55) (16 - 20)  SpO2: 95% (03 Sep 2023 12:55) (94% - 95%)    Parameters below as of 03 Sep 2023 12:55  Patient On (Oxygen Delivery Method): room air      PHYSICAL EXAM:  GENERAL: Not in distress   CHEST/LUNG: Not using accessory muscles  HEART: s1 and s2 present  ABDOMEN:  Nontender and incision site healed  EXTREMITIES: No pedal  edema  CNS: Awake and Alert      LABS:                        12.0   11.54 )-----------( 660      ( 03 Sep 2023 07:43 )             36.9                           11.5   9.21  )-----------( 524      ( 02 Sep 2023 06:58 )             35.3       09-03    139  |  107  |  15  ----------------------------<  140<H>  3.1<L>   |  23  |  0.75    Ca    9.1      03 Sep 2023 07:43      09-02    137  |  106  |  27<H>  ----------------------------<  121<H>  3.5   |  22  |  0.74    Ca    8.1<L>      02 Sep 2023 06:58  Phos  2.8     09-01  Mg     1.6     09-01    TPro  6.0  /  Alb  2.1<L>  /  TBili  0.4  /  DBili  x   /  AST  8<L>  /  ALT  23  /  AlkPhos  103  08-31      Urinalysis Basic - ( 01 Sep 2023 08:24 )  Color: x / Appearance: x / SG: x / pH: x  Gluc: 102 mg/dL / Ketone: x  / Bili: x / Urobili: x   Blood: x / Protein: x / Nitrite: x   Leuk Esterase: x / RBC: x / WBC x   Sq Epi: x / Non Sq Epi: x / Bacteria: x        MEDICATIONS  (STANDING):    apixaban 5 milliGRAM(s) Oral every 12 hours  aspirin  chewable 81 milliGRAM(s) Oral daily  atorvastatin 20 milliGRAM(s) Oral at bedtime  dextrose 5% + sodium chloride 0.9%. 1000 milliLiter(s) (80 mL/Hr) IV Continuous <Continuous>  gabapentin 100 milliGRAM(s) Oral every 8 hours  levETIRAcetam 750 milliGRAM(s) Oral two times a day  levothyroxine 88 MICROGram(s) Oral daily  melatonin 5 milliGRAM(s) Oral at bedtime  oxybutynin 10 milliGRAM(s) Oral daily  pantoprazole    Tablet 40 milliGRAM(s) Oral before breakfast        RADIOLOGY & ADDITIONAL TESTS:    9/1/23 : CT Abdomen and Pelvis w/ Oral Cont (09.01.23 @ 15:20) Status post colectomy with ileorectal anastomosis.    Fluid throughout non- obstructed bowel and rectum, consistent with enteritis.    Extensive subcutaneous emphysema in the anterior abdominal wall. While findings may be postoperative given the history of recent surgery, in the   appropriate clinical context, necrotizing infection may be considered. Careful clinical evaluation is therefore warranted.    These findings were discussed with Dr. MARQUITA MORFIN [phone] at  9/1/2023 3:43 PM with readback.      8/31/23 : Xray Chest 1 View- PORTABLE-Urgent (Xray Chest 1 View- PORTABLE-Urgent .) (08.31.23 @ 23:17) No acute pulmonary disease.        MICROBIOLOGY DATA:    Clostridium difficile Toxin by PCR (09.02.23 @ 05:16)   C Diff by PCR Result: NotDetec:     Culture - Stool (09.01.23 @ 03:30)   Specimen Source: .Stool Feces  Culture Results:   No enteric pathogens to date: Final culture pending    GI PCR Panel Stool (09.01.23 @ 03:30)   GI PCR Panel: NotDetec:     Urine Microscopic-Add On (NC) (09.01.23 @ 03:10)   Red Blood Cell - Urine: 0 /HPF  White Blood Cell - Urine: 4 /HPF  Bacteria: Many /HPF  Comment - Urine: few amorphous urates  Squamous Epithelial Cells: PresentRapid HIV-1/2 Antibody (08.31.23 @ 21:30)   Rapid HIV-1/2 Antibody: Nonreact:             Patient is seen and examined at the bed side, is afebrile and Blood pressure within normal limit.   She remains NPO, refusing NGT.      REVIEW OF SYSTEMS: All other review systems are negative      ALLERGIES: Zosyn ( Swelling of lips and itching)       Vital Signs Last 24 Hrs  T(C): 36.8 (03 Sep 2023 12:55), Max: 37.1 (03 Sep 2023 05:40)  T(F): 98.2 (03 Sep 2023 12:55), Max: 98.7 (03 Sep 2023 05:40)  HR: 88 (03 Sep 2023 12:55) (84 - 90)  BP: 120/83 (03 Sep 2023 12:55) (107/74 - 120/83)  BP(mean): --  RR: 20 (03 Sep 2023 12:55) (16 - 20)  SpO2: 95% (03 Sep 2023 12:55) (94% - 95%)    Parameters below as of 03 Sep 2023 12:55  Patient On (Oxygen Delivery Method): room air      PHYSICAL EXAM:  GENERAL: Not in distress   CHEST/LUNG: Not using accessory muscles  HEART: s1 and s2 present  ABDOMEN:  Nontender and incision site healed  EXTREMITIES: No pedal  edema  CNS: Awake and Alert      LABS:                        12.0   11.54 )-----------( 660      ( 03 Sep 2023 07:43 )             36.9                           11.5   9.21  )-----------( 524      ( 02 Sep 2023 06:58 )             35.3       09-03    139  |  107  |  15  ----------------------------<  140<H>  3.1<L>   |  23  |  0.75    Ca    9.1      03 Sep 2023 07:43      09-02    137  |  106  |  27<H>  ----------------------------<  121<H>  3.5   |  22  |  0.74    Ca    8.1<L>      02 Sep 2023 06:58  Phos  2.8     09-01  Mg     1.6     09-01    TPro  6.0  /  Alb  2.1<L>  /  TBili  0.4  /  DBili  x   /  AST  8<L>  /  ALT  23  /  AlkPhos  103  08-31      Urinalysis Basic - ( 01 Sep 2023 08:24 )  Color: x / Appearance: x / SG: x / pH: x  Gluc: 102 mg/dL / Ketone: x  / Bili: x / Urobili: x   Blood: x / Protein: x / Nitrite: x   Leuk Esterase: x / RBC: x / WBC x   Sq Epi: x / Non Sq Epi: x / Bacteria: x        MEDICATIONS  (STANDING):    apixaban 5 milliGRAM(s) Oral every 12 hours  aspirin  chewable 81 milliGRAM(s) Oral daily  atorvastatin 20 milliGRAM(s) Oral at bedtime  dextrose 5% + sodium chloride 0.9%. 1000 milliLiter(s) (80 mL/Hr) IV Continuous <Continuous>  gabapentin 100 milliGRAM(s) Oral every 8 hours  levETIRAcetam 750 milliGRAM(s) Oral two times a day  levothyroxine 88 MICROGram(s) Oral daily  melatonin 5 milliGRAM(s) Oral at bedtime  oxybutynin 10 milliGRAM(s) Oral daily  pantoprazole    Tablet 40 milliGRAM(s) Oral before breakfast        RADIOLOGY & ADDITIONAL TESTS:    9/1/23 : CT Abdomen and Pelvis w/ Oral Cont (09.01.23 @ 15:20) Status post colectomy with ileorectal anastomosis.    Fluid throughout non- obstructed bowel and rectum, consistent with enteritis.    Extensive subcutaneous emphysema in the anterior abdominal wall. While findings may be postoperative given the history of recent surgery, in the   appropriate clinical context, necrotizing infection may be considered. Careful clinical evaluation is therefore warranted.    These findings were discussed with Dr. MARQUITA MORFIN [phone] at  9/1/2023 3:43 PM with readback.      8/31/23 : Xray Chest 1 View- PORTABLE-Urgent (Xray Chest 1 View- PORTABLE-Urgent .) (08.31.23 @ 23:17) No acute pulmonary disease.        MICROBIOLOGY DATA:    Clostridium difficile Toxin by PCR (09.02.23 @ 05:16)   C Diff by PCR Result: NotDetec:     Culture - Stool (09.01.23 @ 03:30)   Specimen Source: .Stool Feces  Culture Results:   No enteric pathogens to date: Final culture pending    GI PCR Panel Stool (09.01.23 @ 03:30)   GI PCR Panel: NotDetec:     Urine Microscopic-Add On (NC) (09.01.23 @ 03:10)   Red Blood Cell - Urine: 0 /HPF  White Blood Cell - Urine: 4 /HPF  Bacteria: Many /HPF  Comment - Urine: few amorphous urates  Squamous Epithelial Cells: PresentRapid HIV-1/2 Antibody (08.31.23 @ 21:30)   Rapid HIV-1/2 Antibody: Nonreact:             Patient is seen and examined at the bed side, is afebrile and Blood pressure within normal limit.   She remains NPO, refusing NGT.      REVIEW OF SYSTEMS: All other review systems are negative      ALLERGIES: Zosyn ( Swelling of lips and itching)       Vital Signs Last 24 Hrs  T(C): 36.8 (03 Sep 2023 12:55), Max: 37.1 (03 Sep 2023 05:40)  T(F): 98.2 (03 Sep 2023 12:55), Max: 98.7 (03 Sep 2023 05:40)  HR: 88 (03 Sep 2023 12:55) (84 - 90)  BP: 120/83 (03 Sep 2023 12:55) (107/74 - 120/83)  BP(mean): --  RR: 20 (03 Sep 2023 12:55) (16 - 20)  SpO2: 95% (03 Sep 2023 12:55) (94% - 95%)    Parameters below as of 03 Sep 2023 12:55  Patient On (Oxygen Delivery Method): room air      PHYSICAL EXAM:  GENERAL: Not in distress   CHEST/LUNG: Not using accessory muscles  HEART: s1 and s2 present  ABDOMEN:  Nontender and incision site healed  EXTREMITIES: No pedal  edema  CNS: Awake and Alert      LABS:                        12.0   11.54 )-----------( 660      ( 03 Sep 2023 07:43 )             36.9                           11.5   9.21  )-----------( 524      ( 02 Sep 2023 06:58 )             35.3       09-03    139  |  107  |  15  ----------------------------<  140<H>  3.1<L>   |  23  |  0.75    Ca    9.1      03 Sep 2023 07:43      09-02    137  |  106  |  27<H>  ----------------------------<  121<H>  3.5   |  22  |  0.74    Ca    8.1<L>      02 Sep 2023 06:58  Phos  2.8     09-01  Mg     1.6     09-01    TPro  6.0  /  Alb  2.1<L>  /  TBili  0.4  /  DBili  x   /  AST  8<L>  /  ALT  23  /  AlkPhos  103  08-31      Urinalysis Basic - ( 01 Sep 2023 08:24 )  Color: x / Appearance: x / SG: x / pH: x  Gluc: 102 mg/dL / Ketone: x  / Bili: x / Urobili: x   Blood: x / Protein: x / Nitrite: x   Leuk Esterase: x / RBC: x / WBC x   Sq Epi: x / Non Sq Epi: x / Bacteria: x        MEDICATIONS  (STANDING):    apixaban 5 milliGRAM(s) Oral every 12 hours  aspirin  chewable 81 milliGRAM(s) Oral daily  atorvastatin 20 milliGRAM(s) Oral at bedtime  dextrose 5% + sodium chloride 0.9%. 1000 milliLiter(s) (80 mL/Hr) IV Continuous <Continuous>  gabapentin 100 milliGRAM(s) Oral every 8 hours  levETIRAcetam 750 milliGRAM(s) Oral two times a day  levothyroxine 88 MICROGram(s) Oral daily  melatonin 5 milliGRAM(s) Oral at bedtime  oxybutynin 10 milliGRAM(s) Oral daily  pantoprazole    Tablet 40 milliGRAM(s) Oral before breakfast        RADIOLOGY & ADDITIONAL TESTS:    9/1/23 : CT Abdomen and Pelvis w/ Oral Cont (09.01.23 @ 15:20) Status post colectomy with ileorectal anastomosis.    Fluid throughout non- obstructed bowel and rectum, consistent with enteritis.    Extensive subcutaneous emphysema in the anterior abdominal wall. While findings may be postoperative given the history of recent surgery, in the   appropriate clinical context, necrotizing infection may be considered. Careful clinical evaluation is therefore warranted.    These findings were discussed with Dr. MARQUITA MORFIN [phone] at  9/1/2023 3:43 PM with readback.      8/31/23 : Xray Chest 1 View- PORTABLE-Urgent (Xray Chest 1 View- PORTABLE-Urgent .) (08.31.23 @ 23:17) No acute pulmonary disease.        MICROBIOLOGY DATA:    Clostridium difficile Toxin by PCR (09.02.23 @ 05:16)   C Diff by PCR Result: NotDetec:     Culture - Stool (09.01.23 @ 03:30)   Specimen Source: .Stool Feces  Culture Results:   No enteric pathogens to date: Final culture pending    GI PCR Panel Stool (09.01.23 @ 03:30)   GI PCR Panel: NotDetec:     Urine Microscopic-Add On (NC) (09.01.23 @ 03:10)   Red Blood Cell - Urine: 0 /HPF  White Blood Cell - Urine: 4 /HPF  Bacteria: Many /HPF  Comment - Urine: few amorphous urates  Squamous Epithelial Cells: PresentRapid HIV-1/2 Antibody (08.31.23 @ 21:30)   Rapid HIV-1/2 Antibody: Nonreact:

## 2023-09-03 NOTE — PROGRESS NOTE ADULT - ASSESSMENT
1. Diarrhea  2. R/o infectious colitis  3. R/o C. Diff colitis  4. Anemia  5. No evidence of acute GI bleeding  6. H/o colon cancer    Suggestions:    1. Check stool for culture and C. Diff  2. Antibiotics as per ID  3. ID follow up  4. Monitor electrolytes  5. IVF hydration  6. Diet as tolerated  7. Monitor H/H  8. Transfuse PRBC as needed  9. Check CEA  10. Oncology follow up  11. Avoid NSAID  12. Protonix daily  13. DVT prophylaxis

## 2023-09-03 NOTE — PROGRESS NOTE ADULT - ASSESSMENT
77 y/o F with PMH transverse colon neoplasm s/p colectomy in [facility] 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 Patient reports that since yesterday she feels weak and tired,chronic diarrhea,hypotension, now ileus.  1.?ileus-Abd xray-p.  2.Chronic diarrhea-CT abd and pelvis,as above, GI and surgical eval noted.  3.Hypothyroid-synthroid.  4.Echocardiogram.  5.CAD-asa,b blocker,statin.  6.Afib-eliquis.  7.UTI-ABX d/c as per ID.  8.GI prophylaxis. 77 y/o F with PMH transverse colon neoplasm s/p colectomy in [facility] 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 Patient reports that since yesterday she feels weak and tired,chronic diarrhea,hypotension, now ileus.  1.?ileus-Abd xray-p.  2.Chronic diarrhea-CT abd and pelvis,as above, GI and surgical eval noted.  3.Hypothyroid-synthroid.  4.Echocardiogram.  5.CAD-asa,b blocker,statin.  6.Afib-eliquis.  7.UTI-ABX d/c as per ID.  8.GI prophylaxis. 77 y/o F with PMH transverse colon neoplasm s/p colectomy in [facility] 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 Patient reports that since yesterday she feels weak and tired,chronic diarrhea,hypotension, now ileus.  1.?ileus-Abd xray-p.  2.Chronic diarrhea-CT abd and pelvis,as above, GI and surgical eval noted.  3.Hypothyroid-synthroid.  4.Echocardiogram.  5.CAD-asa,b blocker,statin.  6.Afib-eliquis.  7.UTI-ABX d/c as per ID.  8.GI prophylaxis.

## 2023-09-03 NOTE — DIETITIAN INITIAL EVALUATION ADULT - PERTINENT LABORATORY DATA
09-03    139  |  107  |  15  ----------------------------<  140<H>  3.1<L>   |  23  |  0.75    Ca    9.1      03 Sep 2023 07:43

## 2023-09-04 ENCOUNTER — TRANSCRIPTION ENCOUNTER (OUTPATIENT)
Age: 78
End: 2023-09-04

## 2023-09-04 LAB
ANION GAP SERPL CALC-SCNC: 9 MMOL/L — SIGNIFICANT CHANGE UP (ref 5–17)
ANISOCYTOSIS BLD QL: SLIGHT — SIGNIFICANT CHANGE UP
BASOPHILS # BLD AUTO: 0 K/UL — SIGNIFICANT CHANGE UP (ref 0–0.2)
BASOPHILS NFR BLD AUTO: 0 % — SIGNIFICANT CHANGE UP (ref 0–2)
BUN SERPL-MCNC: 21 MG/DL — HIGH (ref 7–18)
CALCIUM SERPL-MCNC: 8.2 MG/DL — LOW (ref 8.4–10.5)
CEA SERPL-MCNC: 3.6 NG/ML — SIGNIFICANT CHANGE UP (ref 0–3.8)
CHLORIDE SERPL-SCNC: 109 MMOL/L — HIGH (ref 96–108)
CO2 SERPL-SCNC: 23 MMOL/L — SIGNIFICANT CHANGE UP (ref 22–31)
CREAT SERPL-MCNC: 0.74 MG/DL — SIGNIFICANT CHANGE UP (ref 0.5–1.3)
EGFR: 82 ML/MIN/1.73M2 — SIGNIFICANT CHANGE UP
EOSINOPHIL # BLD AUTO: 0 K/UL — SIGNIFICANT CHANGE UP (ref 0–0.5)
EOSINOPHIL NFR BLD AUTO: 0 % — SIGNIFICANT CHANGE UP (ref 0–6)
GLUCOSE SERPL-MCNC: 116 MG/DL — HIGH (ref 70–99)
HCT VFR BLD CALC: 35.8 % — SIGNIFICANT CHANGE UP (ref 34.5–45)
HGB BLD-MCNC: 11.4 G/DL — LOW (ref 11.5–15.5)
HYPOCHROMIA BLD QL: SLIGHT — SIGNIFICANT CHANGE UP
LYMPHOCYTES # BLD AUTO: 35 % — SIGNIFICANT CHANGE UP (ref 13–44)
LYMPHOCYTES # BLD AUTO: 4.48 K/UL — HIGH (ref 1–3.3)
MACROCYTES BLD QL: SLIGHT — SIGNIFICANT CHANGE UP
MANUAL SMEAR VERIFICATION: SIGNIFICANT CHANGE UP
MCHC RBC-ENTMCNC: 28.1 PG — SIGNIFICANT CHANGE UP (ref 27–34)
MCHC RBC-ENTMCNC: 31.8 GM/DL — LOW (ref 32–36)
MCV RBC AUTO: 88.4 FL — SIGNIFICANT CHANGE UP (ref 80–100)
MICROCYTES BLD QL: SLIGHT — SIGNIFICANT CHANGE UP
MONOCYTES # BLD AUTO: 1.41 K/UL — HIGH (ref 0–0.9)
MONOCYTES NFR BLD AUTO: 11 % — SIGNIFICANT CHANGE UP (ref 2–14)
NEUTROPHILS # BLD AUTO: 6.91 K/UL — SIGNIFICANT CHANGE UP (ref 1.8–7.4)
NEUTROPHILS NFR BLD AUTO: 54 % — SIGNIFICANT CHANGE UP (ref 43–77)
NRBC # BLD: 0 /100 — SIGNIFICANT CHANGE UP (ref 0–0)
OVALOCYTES BLD QL SMEAR: SLIGHT — SIGNIFICANT CHANGE UP
PLAT MORPH BLD: NORMAL — SIGNIFICANT CHANGE UP
PLATELET # BLD AUTO: 602 K/UL — HIGH (ref 150–400)
PLATELET COUNT - ESTIMATE: NORMAL — SIGNIFICANT CHANGE UP
POIKILOCYTOSIS BLD QL AUTO: SLIGHT — SIGNIFICANT CHANGE UP
POLYCHROMASIA BLD QL SMEAR: SLIGHT — SIGNIFICANT CHANGE UP
POTASSIUM SERPL-MCNC: 3.2 MMOL/L — LOW (ref 3.5–5.3)
POTASSIUM SERPL-SCNC: 3.2 MMOL/L — LOW (ref 3.5–5.3)
RBC # BLD: 4.05 M/UL — SIGNIFICANT CHANGE UP (ref 3.8–5.2)
RBC # FLD: 15.7 % — HIGH (ref 10.3–14.5)
RBC BLD AUTO: ABNORMAL
SODIUM SERPL-SCNC: 141 MMOL/L — SIGNIFICANT CHANGE UP (ref 135–145)
WBC # BLD: 12.8 K/UL — HIGH (ref 3.8–10.5)
WBC # FLD AUTO: 12.8 K/UL — HIGH (ref 3.8–10.5)

## 2023-09-04 PROCEDURE — 99231 SBSQ HOSP IP/OBS SF/LOW 25: CPT

## 2023-09-04 RX ORDER — POTASSIUM CHLORIDE 20 MEQ
10 PACKET (EA) ORAL
Refills: 0 | Status: COMPLETED | OUTPATIENT
Start: 2023-09-04 | End: 2023-09-04

## 2023-09-04 RX ADMIN — Medication 100 MILLIEQUIVALENT(S): at 10:20

## 2023-09-04 RX ADMIN — Medication 100 MILLIEQUIVALENT(S): at 09:23

## 2023-09-04 RX ADMIN — Medication 100 MILLIEQUIVALENT(S): at 11:19

## 2023-09-04 RX ADMIN — GABAPENTIN 100 MILLIGRAM(S): 400 CAPSULE ORAL at 15:22

## 2023-09-04 RX ADMIN — ONDANSETRON 4 MILLIGRAM(S): 8 TABLET, FILM COATED ORAL at 19:27

## 2023-09-04 RX ADMIN — Medication 5 MILLIGRAM(S): at 21:12

## 2023-09-04 RX ADMIN — LEVETIRACETAM 750 MILLIGRAM(S): 250 TABLET, FILM COATED ORAL at 17:24

## 2023-09-04 RX ADMIN — ATORVASTATIN CALCIUM 20 MILLIGRAM(S): 80 TABLET, FILM COATED ORAL at 21:12

## 2023-09-04 RX ADMIN — GABAPENTIN 100 MILLIGRAM(S): 400 CAPSULE ORAL at 21:12

## 2023-09-04 RX ADMIN — Medication 10 MILLIGRAM(S): at 15:22

## 2023-09-04 RX ADMIN — Medication 81 MILLIGRAM(S): at 15:21

## 2023-09-04 RX ADMIN — ONDANSETRON 4 MILLIGRAM(S): 8 TABLET, FILM COATED ORAL at 07:04

## 2023-09-04 RX ADMIN — APIXABAN 5 MILLIGRAM(S): 2.5 TABLET, FILM COATED ORAL at 17:23

## 2023-09-04 NOTE — PROGRESS NOTE ADULT - SUBJECTIVE AND OBJECTIVE BOX
Patient seen and examined at bedside  Admits to acid reflux and hiccups  Admits to passing flatus and BM   Denies current abdominal pain   Denies nausea and vomiting today, per RN patient vomited twice yesterday    Vital Signs Last 24 Hrs  T(F): 97.7 (09-04-23 @ 05:27), Max: 98.2 (09-03-23 @ 12:55)  HR: 66 (09-04-23 @ 05:27)  BP: 116/70 (09-04-23 @ 05:27)  RR: 17 (09-04-23 @ 05:27)  SpO2: 95% (09-04-23 @ 05:27)    GENERAL: Alert, NAD  CHEST/LUNG: respirations nonlabored  ABDOMEN: soft, Nontender, Nondistended, no crepitus or subcutaneous emphysema on palpation   EXTREMITIES:  no calf tenderness, No edema    I&O's Detail    03 Sep 2023 07:01  -  04 Sep 2023 07:00  --------------------------------------------------------  IN:  Total IN: 0 mL    OUT:    Stool (mL): 1 mL  Total OUT: 1 mL    Total NET: -1 mL    LABS:                        11.4   12.80 )-----------( 602      ( 04 Sep 2023 07:24 )             35.8     09-04    141  |  109<H>  |  21<H>  ----------------------------<  116<H>  3.2<L>   |  23  |  0.74    Ca    8.2<L>      04 Sep 2023 07:24       Patient seen and examined at bedside  Admits to acid reflux and hiccups  Admits to passing flatus and BM   Denies current abdominal pain   Denies nausea and vomiting today, per RN patient vomited twice yesterday    Vital Signs Last 24 Hrs  T(F): 97.7 (09-04-23 @ 05:27), Max: 98.2 (09-03-23 @ 12:55)  HR: 66 (09-04-23 @ 05:27)  BP: 116/70 (09-04-23 @ 05:27)  RR: 17 (09-04-23 @ 05:27)  SpO2: 95% (09-04-23 @ 05:27)    GENERAL: Alert, NAD  CHEST/LUNG: respirations nonlabored  ABDOMEN: soft, Nontender, Nondistended, no crepitus or subcutaneous emphysema on palpation   EXTREMITIES:  no calf tenderness, No edema    I&O's Detail    03 Sep 2023 07:01  -  04 Sep 2023 07:00  --------------------------------------------------------  IN:  Total IN: 0 mL    OUT:    Stool (mL): 1 mL  Total OUT: 1 mL    Total NET: -1 mL    LABS:                        11.4   12.80 )-----------( 602      ( 04 Sep 2023 07:24 )             35.8     09-04    141  |  109<H>  |  21<H>  ----------------------------<  116<H>  3.2<L>   |  23  |  0.74    Ca    8.2<L>      04 Sep 2023 07:24    Culture - Urine (09.01.23 @ 03:10)    -  Amikacin: S <=16   -  Amoxicillin/Clavulanic Acid: R >16/8   -  Ampicillin: R >16 These ampicillin results predict results for amoxicillin   -  Ampicillin/Sulbactam: R 8/4   -  Aztreonam: S <=4   -  Cefazolin: R >16   -  Cefepime: S <=2   -  Cefoxitin: R >16   -  Ceftriaxone: S <=1   -  Ciprofloxacin: S <=0.25   -  Ertapenem: S <=0.5   -  Gentamicin: S <=2   -  Imipenem: S <=1   -  Levofloxacin: S <=0.5   -  Meropenem: S <=1   -  Nitrofurantoin: I 64 Should not be used to treat pyelonephritis   -  Piperacillin/Tazobactam: S <=8   -  Tobramycin: S <=2   -  Trimethoprim/Sulfamethoxazole: S <=0.5/9.5   Specimen Source: Clean Catch Clean Catch (Midstream)   Culture Results:   >100,000 CFU/ml Enterobacter cloacae complex  >100,000 CFU/ml Aerococcus species  "Aerococcus spp. are predictably susceptible to penicillin,  ampicillin, tetracycline, and vancomycin.  All isolates are  resistant to sulfonamides"   Organism Identification: Enterobacter cloacae complex   Organism: Enterobacter cloacae complex   Method Type: GIAN

## 2023-09-04 NOTE — DISCHARGE NOTE PROVIDER - HOSPITAL COURSE
77 y/o F with PMH transverse colon neoplasm s/p colectomy in [facility] 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 Patient reports that since yesterday she feels weak and tired. Patient also reports that she feels lightheaded more than usual and feels lethargic. Patient reports when her blood pressure was checked in the facility it was in the low 80s. Patient reports that since her surgery earlier in August she is not eating much at all. She reports that since the surgery she has had diarrhea. Patient reports she used to have 5-6 episodes of loose watery bowel movements every day and now have improved to 2-3 bowel movements daily.  Patient denies any nausea, vomiting, constipation associated with it. Patient reports that she feels dizzy and lightheaded when she stands up from a sitting position that has worsened since yesterday. Patient also denies any recent fevers, chills, headache, chest pain, palpitations, shortness of breath, cough, nausea, vomiting, constipation, melena, hematochezia, dysuria, urinary frequency, or urgency. Patient denies any other complains at this time.     In the ED: Temp 98.6, HR 83, RR 18, BP 95/65, Pulseox 95% on RA. WBC 7.6K, Cr 1.22    #Acute on Chronic Diarrhea  CT A/P showed extensive subctuaneous emphysema in the anterior abdominal wall, Fluid throughout nonosbtructed bowel and rectum, consistent with enteritis  Patient was kept NPO  Plaved on intravenous fluids  Surgery was consulted and did not recommend any acute intervention  NGT was offered as patient has several episodes of emesis with some fecal material but patient refused  Patient continued to have diarrhea and she was able to tolerate a diet  Cdiff and stool culture were negative    #Hypotension  Due to hypovolemia  After fluid resuscitation, her blood pressure normalized    #Mild ROSSANA  Likely due to hypovolemia  Rapidly improved with intravenous fluid    Please note this is only a summary, refer to the full chart for further details.     79 y/o F with PMH transverse colon neoplasm s/p colectomy in [facility] 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 Patient reports that since yesterday she feels weak and tired. Patient also reports that she feels lightheaded more than usual and feels lethargic. Patient reports when her blood pressure was checked in the facility it was in the low 80s. Patient reports that since her surgery earlier in August she is not eating much at all. She reports that since the surgery she has had diarrhea. Patient reports she used to have 5-6 episodes of loose watery bowel movements every day and now have improved to 2-3 bowel movements daily.  Patient denies any nausea, vomiting, constipation associated with it. Patient reports that she feels dizzy and lightheaded when she stands up from a sitting position that has worsened since yesterday. Patient also denies any recent fevers, chills, headache, chest pain, palpitations, shortness of breath, cough, nausea, vomiting, constipation, melena, hematochezia, dysuria, urinary frequency, or urgency. Patient denies any other complains at this time.     In the ED: Temp 98.6, HR 83, RR 18, BP 95/65, Pulseox 95% on RA. WBC 7.6K, Cr 1.22    #Acute on Chronic Diarrhea  CT A/P showed extensive subctuaneous emphysema in the anterior abdominal wall, Fluid throughout nonosbtructed bowel and rectum, consistent with enteritis  Patient was kept NPO  Plaved on intravenous fluids  Surgery was consulted and did not recommend any acute intervention  NGT was offered as patient has several episodes of emesis with some fecal material but patient refused  Patient continued to have diarrhea and she was able to tolerate a diet  Cdiff and stool culture were negative    #Hypotension  Due to hypovolemia  After fluid resuscitation, her blood pressure normalized    #Mild ROSSANA  Likely due to hypovolemia  Rapidly improved with intravenous fluid    Please note this is only a summary, refer to the full chart for further details.     79 y/o F with PMH transverse colon neoplasm s/p colectomy in [facility] 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 Patient reports that since yesterday she feels weak and tired. Patient also reports that she feels lightheaded more than usual and feels lethargic. Patient reports when her blood pressure was checked in the facility it was in the low 80s. Patient reports that since her surgery earlier in August she is not eating much at all. She reports that since the surgery she has had diarrhea. Patient reports she used to have 5-6 episodes of loose watery bowel movements every day and now have improved to 2-3 bowel movements daily.  Patient denies any nausea, vomiting, constipation associated with it. Patient reports that she feels dizzy and lightheaded when she stands up from a sitting position that has worsened since yesterday. Patient also denies any recent fevers, chills, headache, chest pain, palpitations, shortness of breath, cough, nausea, vomiting, constipation, melena, hematochezia, dysuria, urinary frequency, or urgency. Patient denies any other complains at this time.     In the ED: Temp 98.6, HR 83, RR 18, BP 95/65, Pulseox 95% on RA. WBC 7.6K, Cr 1.22    #Acute on Chronic Diarrhea  CT A/P showed extensive subctuaneous emphysema in the anterior abdominal wall, Fluid throughout nonosbtructed bowel and rectum, consistent with enteritis  Patient was kept NPO  Plaved on intravenous fluids  Surgery was consulted and did not recommend any acute intervention  NGT was offered as patient has several episodes of emesis with some fecal material but patient refused  Patient continued to have diarrhea and she was able to tolerate a diet  Cdiff and stool culture were negative    #Hypotension  Due to hypovolemia  After fluid resuscitation, her blood pressure normalized    #Mild ROSSANA  Likely due to hypovolemia  Rapidly improved with intravenous fluid    Please note this is only a summary, refer to the full chart for further details.     77 y/o F with PMH transverse colon neoplasm s/p colectomy in [facility] 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 Patient reports that since yesterday she feels weak and tired. Patient also reports that she feels lightheaded more than usual and feels lethargic. Patient reports when her blood pressure was checked in the facility it was in the low 80s. Patient reports that since her surgery earlier in August she is not eating much at all. She reports that since the surgery she has had diarrhea. Patient reports she used to have 5-6 episodes of loose watery bowel movements every day and now have improved to 2-3 bowel movements daily. Admitted to medicine for Acute on Chronic Diarrhea and ROSSANA  CT A/P showed extensive subctuaneous emphysema in the anterior abdominal wall, Fluid throughout nonobstructive bowel and rectum, consistent with enteritis  Patient was kept NPO Placed on intravenous fluids Surgery was consulted and did not recommend any acute intervention  NGT was offered as patient has several episodes of emesis with some fecal material but patient refused  Patient continued to have diarrhea and she was able to tolerate a diet Cdiff and stool culture were negative  Also noted  with Hypotension, thought likely Due to hypovolemia After fluid resuscitation started on midodrine with improvement  her blood pressure normalized. Mild ROSSANA Likely due to hypovolemia continued IVF   Hospital course complicated by worsening leukocytosis started on levaquin repeat Abd xray on 9/8 consistant with SBO   Transfer initiated back to [facility] under the care of operating physician Dr Mckeon     Please note this is only a summary, refer to the full chart for further details.     79 y/o F with PMH transverse colon neoplasm s/p colectomy in [facility] 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 Patient reports that since yesterday she feels weak and tired. Patient also reports that she feels lightheaded more than usual and feels lethargic. Patient reports when her blood pressure was checked in the facility it was in the low 80s. Patient reports that since her surgery earlier in August she is not eating much at all. She reports that since the surgery she has had diarrhea. Patient reports she used to have 5-6 episodes of loose watery bowel movements every day and now have improved to 2-3 bowel movements daily. Admitted to medicine for Acute on Chronic Diarrhea and ROSSANA  CT A/P showed extensive subctuaneous emphysema in the anterior abdominal wall, Fluid throughout nonobstructive bowel and rectum, consistent with enteritis  Patient was kept NPO Placed on intravenous fluids Surgery was consulted and did not recommend any acute intervention  NGT was offered as patient has several episodes of emesis with some fecal material but patient refused  Patient continued to have diarrhea and she was able to tolerate a diet Cdiff and stool culture were negative  Also noted  with Hypotension, thought likely Due to hypovolemia After fluid resuscitation started on midodrine with improvement  her blood pressure normalized. Mild ROSSANA Likely due to hypovolemia continued IVF   Hospital course complicated by worsening leukocytosis started on levaquin repeat Abd xray on 9/8 consistant with SBO   Transfer initiated back to [facility] under the care of operating physician Dr Mckeon     Please note this is only a summary, refer to the full chart for further details.

## 2023-09-04 NOTE — DISCHARGE NOTE PROVIDER - CARE PROVIDER_API CALL
Criselda Briggs  Cardiology  [address] [place] [address]  Phone: [phone]  Fax: [phone]  Follow Up Time: 2 weeks   Criselda Briggs  Cardiology  [address] [place] [address]  Phone: [phone]  Fax: [phone]  Follow Up Time: 2 weeks   Criselda Briggs  Cardiology  [address] [place] [address]  Phone: [phone]  Fax: [phone]  Follow Up Time: 2 weeks

## 2023-09-04 NOTE — PROGRESS NOTE ADULT - ASSESSMENT
78 year old female with subcutaneous emphysema s/p robotic colectomy at [facility]  exam benign    - no acute surgical intervention at this time  - continue medical management  - consider trial of clear liquids if no nausea today  - discuss with Dr. Celeste  78 year old female with subcutaneous emphysema s/p robotic colectomy at [facility]  exam benign    - no acute surgical intervention at this time  - continue medical management  - consider trial of clear liquids if no nausea today  - discuss with Dr. Celeste  78 year old female with subcutaneous emphysema s/p robotic colectomy at [facility]  exam benign    - no acute surgical intervention at this time  - continue medical management  - consider trial of clear liquids if no nausea today  - discuss with Dr. Celeste  78 year old female with subcutaneous emphysema s/p robotic colectomy at [facility]  exam benign  + Urine culture     - no acute surgical intervention at this time  - continue medical management  - consider trial of clear liquids if no nausea today  - consider IV antibiotics for UTI/+ urine culture   - discuss with Dr. Celeste

## 2023-09-04 NOTE — PROGRESS NOTE ADULT - ASSESSMENT
1. Diarrhea  2. R/o infectious colitis  3. R/o C. Diff colitis  4. Anemia  5. No evidence of acute GI bleeding  6. H/o colon cancer    Suggestions:    1. NPO  2. Antibiotics as per ID  3. ID follow up  4. Monitor electrolytes  5. IVF hydration  6. Monitor H/H  7. Transfuse PRBC as needed  8. Surgical follow up  9. Avoid NSAID  10. Protonix daily  11. DVT prophylaxis

## 2023-09-04 NOTE — PROGRESS NOTE ADULT - ASSESSMENT
77 y/o F with PMH transverse colon neoplasm s/p colectomy in [facility] 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 Patient reports that since yesterday she feels weak and tired,chronic diarrhea,hypotension, now ileus.  1.?ileus-resolving.  2.Chronic diarrhea-replace k+.  3.Hypothyroid-synthroid.  4.Echocardiogram.  5.CAD-asa,b blocker,statin.  6.Afib-eliquis.  7.UTI-ABX d/c as per ID-colonization.  8.GI prophylaxis. 79 y/o F with PMH transverse colon neoplasm s/p colectomy in [facility] 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 Patient reports that since yesterday she feels weak and tired,chronic diarrhea,hypotension, now ileus.  1.?ileus-resolving.  2.Chronic diarrhea-replace k+.  3.Hypothyroid-synthroid.  4.Echocardiogram.  5.CAD-asa,b blocker,statin.  6.Afib-eliquis.  7.UTI-ABX d/c as per ID-colonization.  8.GI prophylaxis. 79 y/o F with PMH transverse colon neoplasm s/p colectomy in [facility] 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 Patient reports that since yesterday she feels weak and tired,chronic diarrhea,hypotension, now ileus.  1.?ileus-resolving.  2.Chronic diarrhea-replace k+.  3.Hypothyroid-synthroid.  4.Echocardiogram.  5.CAD-asa,b blocker,statin.  6.Afib-eliquis.  7.UTI-ABX d/c as per ID-colonization.  8.GI prophylaxis.

## 2023-09-04 NOTE — CHART NOTE - NSCHARTNOTEFT_GEN_A_CORE
Pt followed by surgical service, Dr Celeste  see prev chart note today  can also recommend Maalox/tums for indigestion

## 2023-09-04 NOTE — PROGRESS NOTE ADULT - SUBJECTIVE AND OBJECTIVE BOX
Patient is seen and examined at the bed side, is afebrile and Blood pressure within normal limit.   She remains NPO, refusing NGT.      REVIEW OF SYSTEMS: All other review systems are negative      ALLERGIES: Zosyn ( Swelling of lips and itching)       Vital Signs Last 24 Hrs  T(C): 36.5 (04 Sep 2023 05:27), Max: 36.5 (03 Sep 2023 21:17)  T(F): 97.7 (04 Sep 2023 05:27), Max: 97.7 (03 Sep 2023 21:17)  HR: 66 (04 Sep 2023 05:27) (66 - 74)  BP: 116/70 (04 Sep 2023 05:27) (108/65 - 116/70)  BP(mean): --  RR: 17 (04 Sep 2023 05:27) (17 - 18)  SpO2: 95% (04 Sep 2023 05:27) (95% - 95%)    Parameters below as of 04 Sep 2023 05:27  Patient On (Oxygen Delivery Method): room air          PHYSICAL EXAM:  GENERAL: Not in distress   CHEST/LUNG: Not using accessory muscles  HEART: s1 and s2 present  ABDOMEN:  Nontender and incision site healed  EXTREMITIES: No pedal  edema  CNS: Awake and Alert      LABS:                        11.4   12.80 )-----------( 602      ( 04 Sep 2023 07:24 )             35.8                           12.0   11.54 )-----------( 660      ( 03 Sep 2023 07:43 )             36.9                      09-04    141  |  109<H>  |  21<H>  ----------------------------<  116<H>  3.2<L>   |  23  |  0.74    Ca    8.2<L>      04 Sep 2023 07:24      09-03    139  |  107  |  15  ----------------------------<  140<H>  3.1<L>   |  23  |  0.75    Ca    9.1      03 Sep 2023 07:43      TPro  6.0  /  Alb  2.1<L>  /  TBili  0.4  /  DBili  x   /  AST  8<L>  /  ALT  23  /  AlkPhos  103  08-31      Urinalysis Basic - ( 01 Sep 2023 08:24 )  Color: x / Appearance: x / SG: x / pH: x  Gluc: 102 mg/dL / Ketone: x  / Bili: x / Urobili: x   Blood: x / Protein: x / Nitrite: x   Leuk Esterase: x / RBC: x / WBC x   Sq Epi: x / Non Sq Epi: x / Bacteria: x        MEDICATIONS  (STANDING):    apixaban 5 milliGRAM(s) Oral every 12 hours  aspirin  chewable 81 milliGRAM(s) Oral daily  atorvastatin 20 milliGRAM(s) Oral at bedtime  gabapentin 100 milliGRAM(s) Oral every 8 hours  levETIRAcetam 750 milliGRAM(s) Oral two times a day  levothyroxine 88 MICROGram(s) Oral daily  melatonin 5 milliGRAM(s) Oral at bedtime  oxybutynin 10 milliGRAM(s) Oral daily  pantoprazole    Tablet 40 milliGRAM(s) Oral before breakfast        RADIOLOGY & ADDITIONAL TESTS:    9/1/23 : CT Abdomen and Pelvis w/ Oral Cont (09.01.23 @ 15:20) Status post colectomy with ileorectal anastomosis.    Fluid throughout non- obstructed bowel and rectum, consistent with enteritis.    Extensive subcutaneous emphysema in the anterior abdominal wall. While findings may be postoperative given the history of recent surgery, in the   appropriate clinical context, necrotizing infection may be considered. Careful clinical evaluation is therefore warranted.    These findings were discussed with Dr. MARQUITA MORFIN [phone] at  9/1/2023 3:43 PM with readback.      8/31/23 : Xray Chest 1 View- PORTABLE-Urgent (Xray Chest 1 View- PORTABLE-Urgent .) (08.31.23 @ 23:17) No acute pulmonary disease.        MICROBIOLOGY DATA:    Clostridium difficile Toxin by PCR (09.02.23 @ 05:16)   C Diff by PCR Result: NotDetec:     Culture - Stool (09.01.23 @ 03:30)   Specimen Source: .Stool Feces  Culture Results:   No enteric pathogens to date: Final culture pending    GI PCR Panel Stool (09.01.23 @ 03:30)   GI PCR Panel: NotDetec:     Urine Microscopic-Add On (NC) (09.01.23 @ 03:10)   Red Blood Cell - Urine: 0 /HPF  White Blood Cell - Urine: 4 /HPF  Bacteria: Many /HPF  Comment - Urine: few amorphous urates  Squamous Epithelial Cells: PresentRapid HIV-1/2 Antibody (08.31.23 @ 21:30)   Rapid HIV-1/2 Antibody: Nonreact:                               Patient is seen and examined at the bed side, is afebrile and Blood pressure within normal limit.   She remains NPO, refusing NGT.      REVIEW OF SYSTEMS: All other review systems are negative      ALLERGIES: Zosyn ( Swelling of lips and itching)       Vital Signs Last 24 Hrs  T(C): 36.5 (04 Sep 2023 05:27), Max: 36.5 (03 Sep 2023 21:17)  T(F): 97.7 (04 Sep 2023 05:27), Max: 97.7 (03 Sep 2023 21:17)  HR: 66 (04 Sep 2023 05:27) (66 - 74)  BP: 116/70 (04 Sep 2023 05:27) (108/65 - 116/70)  BP(mean): --  RR: 17 (04 Sep 2023 05:27) (17 - 18)  SpO2: 95% (04 Sep 2023 05:27) (95% - 95%)    Parameters below as of 04 Sep 2023 05:27  Patient On (Oxygen Delivery Method): room air          PHYSICAL EXAM:  GENERAL: Not in distress   CHEST/LUNG: Not using accessory muscles  HEART: s1 and s2 present  ABDOMEN:  Nontender and incision site healed  EXTREMITIES: No pedal  edema  CNS: Awake and Alert      LABS:                        11.4   12.80 )-----------( 602      ( 04 Sep 2023 07:24 )             35.8                           12.0   11.54 )-----------( 660      ( 03 Sep 2023 07:43 )             36.9                      09-04    141  |  109<H>  |  21<H>  ----------------------------<  116<H>  3.2<L>   |  23  |  0.74    Ca    8.2<L>      04 Sep 2023 07:24      09-03    139  |  107  |  15  ----------------------------<  140<H>  3.1<L>   |  23  |  0.75    Ca    9.1      03 Sep 2023 07:43      TPro  6.0  /  Alb  2.1<L>  /  TBili  0.4  /  DBili  x   /  AST  8<L>  /  ALT  23  /  AlkPhos  103  08-31      Urinalysis Basic - ( 01 Sep 2023 08:24 )  Color: x / Appearance: x / SG: x / pH: x  Gluc: 102 mg/dL / Ketone: x  / Bili: x / Urobili: x   Blood: x / Protein: x / Nitrite: x   Leuk Esterase: x / RBC: x / WBC x   Sq Epi: x / Non Sq Epi: x / Bacteria: x        MEDICATIONS  (STANDING):    apixaban 5 milliGRAM(s) Oral every 12 hours  aspirin  chewable 81 milliGRAM(s) Oral daily  atorvastatin 20 milliGRAM(s) Oral at bedtime  gabapentin 100 milliGRAM(s) Oral every 8 hours  levETIRAcetam 750 milliGRAM(s) Oral two times a day  levothyroxine 88 MICROGram(s) Oral daily  melatonin 5 milliGRAM(s) Oral at bedtime  oxybutynin 10 milliGRAM(s) Oral daily  pantoprazole    Tablet 40 milliGRAM(s) Oral before breakfast        RADIOLOGY & ADDITIONAL TESTS:    9/1/23 : CT Abdomen and Pelvis w/ Oral Cont (09.01.23 @ 15:20) Status post colectomy with ileorectal anastomosis.    Fluid throughout non- obstructed bowel and rectum, consistent with enteritis.    Extensive subcutaneous emphysema in the anterior abdominal wall. While findings may be postoperative given the history of recent surgery, in the   appropriate clinical context, necrotizing infection may be considered. Careful clinical evaluation is therefore warranted.    These findings were discussed with Dr. MARQUITA MORFIN [phone] at  9/1/2023 3:43 PM with readback.      8/31/23 : Xray Chest 1 View- PORTABLE-Urgent (Xray Chest 1 View- PORTABLE-Urgent .) (08.31.23 @ 23:17) No acute pulmonary disease.        MICROBIOLOGY DATA:    Clostridium difficile Toxin by PCR (09.02.23 @ 05:16)   C Diff by PCR Result: NotDetec:     Culture - Stool (09.01.23 @ 03:30)   Specimen Source: .Stool Feces  Culture Results:   No enteric pathogens to date: Final culture pending    GI PCR Panel Stool (09.01.23 @ 03:30)   GI PCR Panel: NotDetec:     Urine Microscopic-Add On (NC) (09.01.23 @ 03:10)   Red Blood Cell - Urine: 0 /HPF  White Blood Cell - Urine: 4 /HPF  Bacteria: Many /HPF  Comment - Urine: few amorphous urates  Squamous Epithelial Cells: PresentRapid HIV-1/2 Antibody (08.31.23 @ 21:30)   Rapid HIV-1/2 Antibody: Nonreact:                               Patient is seen and examined at the bed side, is afebrile and Blood pressure within normal limit.   She remains NPO, refusing NGT.      REVIEW OF SYSTEMS: All other review systems are negative      ALLERGIES: Zosyn ( Swelling of lips and itching)       Vital Signs Last 24 Hrs  T(C): 36.5 (04 Sep 2023 05:27), Max: 36.5 (03 Sep 2023 21:17)  T(F): 97.7 (04 Sep 2023 05:27), Max: 97.7 (03 Sep 2023 21:17)  HR: 66 (04 Sep 2023 05:27) (66 - 74)  BP: 116/70 (04 Sep 2023 05:27) (108/65 - 116/70)  BP(mean): --  RR: 17 (04 Sep 2023 05:27) (17 - 18)  SpO2: 95% (04 Sep 2023 05:27) (95% - 95%)    Parameters below as of 04 Sep 2023 05:27  Patient On (Oxygen Delivery Method): room air          PHYSICAL EXAM:  GENERAL: Not in distress   CHEST/LUNG: Not using accessory muscles  HEART: s1 and s2 present  ABDOMEN:  Nontender and incision site healed  EXTREMITIES: No pedal  edema  CNS: Awake and Alert      LABS:                        11.4   12.80 )-----------( 602      ( 04 Sep 2023 07:24 )             35.8                           12.0   11.54 )-----------( 660      ( 03 Sep 2023 07:43 )             36.9                      09-04    141  |  109<H>  |  21<H>  ----------------------------<  116<H>  3.2<L>   |  23  |  0.74    Ca    8.2<L>      04 Sep 2023 07:24      09-03    139  |  107  |  15  ----------------------------<  140<H>  3.1<L>   |  23  |  0.75    Ca    9.1      03 Sep 2023 07:43      TPro  6.0  /  Alb  2.1<L>  /  TBili  0.4  /  DBili  x   /  AST  8<L>  /  ALT  23  /  AlkPhos  103  08-31      Urinalysis Basic - ( 01 Sep 2023 08:24 )  Color: x / Appearance: x / SG: x / pH: x  Gluc: 102 mg/dL / Ketone: x  / Bili: x / Urobili: x   Blood: x / Protein: x / Nitrite: x   Leuk Esterase: x / RBC: x / WBC x   Sq Epi: x / Non Sq Epi: x / Bacteria: x        MEDICATIONS  (STANDING):    apixaban 5 milliGRAM(s) Oral every 12 hours  aspirin  chewable 81 milliGRAM(s) Oral daily  atorvastatin 20 milliGRAM(s) Oral at bedtime  gabapentin 100 milliGRAM(s) Oral every 8 hours  levETIRAcetam 750 milliGRAM(s) Oral two times a day  levothyroxine 88 MICROGram(s) Oral daily  melatonin 5 milliGRAM(s) Oral at bedtime  oxybutynin 10 milliGRAM(s) Oral daily  pantoprazole    Tablet 40 milliGRAM(s) Oral before breakfast        RADIOLOGY & ADDITIONAL TESTS:    9/1/23 : CT Abdomen and Pelvis w/ Oral Cont (09.01.23 @ 15:20) Status post colectomy with ileorectal anastomosis.    Fluid throughout non- obstructed bowel and rectum, consistent with enteritis.    Extensive subcutaneous emphysema in the anterior abdominal wall. While findings may be postoperative given the history of recent surgery, in the   appropriate clinical context, necrotizing infection may be considered. Careful clinical evaluation is therefore warranted.    These findings were discussed with Dr. MARQUITA MORFIN [phone] at  9/1/2023 3:43 PM with readback.      8/31/23 : Xray Chest 1 View- PORTABLE-Urgent (Xray Chest 1 View- PORTABLE-Urgent .) (08.31.23 @ 23:17) No acute pulmonary disease.        MICROBIOLOGY DATA:    Clostridium difficile Toxin by PCR (09.02.23 @ 05:16)   C Diff by PCR Result: NotDetec:     Culture - Stool (09.01.23 @ 03:30)   Specimen Source: .Stool Feces  Culture Results:   No enteric pathogens to date: Final culture pending    GI PCR Panel Stool (09.01.23 @ 03:30)   GI PCR Panel: NotDetec:     Urine Microscopic-Add On (NC) (09.01.23 @ 03:10)   Red Blood Cell - Urine: 0 /HPF  White Blood Cell - Urine: 4 /HPF  Bacteria: Many /HPF  Comment - Urine: few amorphous urates  Squamous Epithelial Cells: PresentRapid HIV-1/2 Antibody (08.31.23 @ 21:30)   Rapid HIV-1/2 Antibody: Nonreact:                               Patient is seen and examined at the bed side, is afebrile. She is feeling better, tolerating clear liquid okay.      REVIEW OF SYSTEMS: All other review systems are negative      ALLERGIES: Zosyn ( Swelling of lips and itching)       Vital Signs Last 24 Hrs  T(C): 36.5 (04 Sep 2023 05:27), Max: 36.5 (03 Sep 2023 21:17)  T(F): 97.7 (04 Sep 2023 05:27), Max: 97.7 (03 Sep 2023 21:17)  HR: 66 (04 Sep 2023 05:27) (66 - 74)  BP: 116/70 (04 Sep 2023 05:27) (108/65 - 116/70)  BP(mean): --  RR: 17 (04 Sep 2023 05:27) (17 - 18)  SpO2: 95% (04 Sep 2023 05:27) (95% - 95%)    Parameters below as of 04 Sep 2023 05:27  Patient On (Oxygen Delivery Method): room air      PHYSICAL EXAM:  GENERAL: Not in distress   CHEST/LUNG: Not using accessory muscles  HEART: s1 and s2 present  ABDOMEN:  Nontender and incision site healed  EXTREMITIES: No pedal  edema  CNS: Awake and Alert      LABS:                        11.4   12.80 )-----------( 602      ( 04 Sep 2023 07:24 )             35.8                           12.0   11.54 )-----------( 660      ( 03 Sep 2023 07:43 )             36.9                      09-04    141  |  109<H>  |  21<H>  ----------------------------<  116<H>  3.2<L>   |  23  |  0.74    Ca    8.2<L>      04 Sep 2023 07:24      09-03    139  |  107  |  15  ----------------------------<  140<H>  3.1<L>   |  23  |  0.75    Ca    9.1      03 Sep 2023 07:43      TPro  6.0  /  Alb  2.1<L>  /  TBili  0.4  /  DBili  x   /  AST  8<L>  /  ALT  23  /  AlkPhos  103  08-31      Urinalysis Basic - ( 01 Sep 2023 08:24 )  Color: x / Appearance: x / SG: x / pH: x  Gluc: 102 mg/dL / Ketone: x  / Bili: x / Urobili: x   Blood: x / Protein: x / Nitrite: x   Leuk Esterase: x / RBC: x / WBC x   Sq Epi: x / Non Sq Epi: x / Bacteria: x        MEDICATIONS  (STANDING):    apixaban 5 milliGRAM(s) Oral every 12 hours  aspirin  chewable 81 milliGRAM(s) Oral daily  atorvastatin 20 milliGRAM(s) Oral at bedtime  gabapentin 100 milliGRAM(s) Oral every 8 hours  levETIRAcetam 750 milliGRAM(s) Oral two times a day  levothyroxine 88 MICROGram(s) Oral daily  melatonin 5 milliGRAM(s) Oral at bedtime  oxybutynin 10 milliGRAM(s) Oral daily  pantoprazole    Tablet 40 milliGRAM(s) Oral before breakfast        RADIOLOGY & ADDITIONAL TESTS:    9/2/23 : Xray Abdomen 1 View (09.02.23 @ 10:24) Markedly distended small bowel loops throughout the abdomen   and pelvis is similar to recent CT. The CT demonstrates a status post colectomy and ileorectal anastomosis. Evaluation for free air limited on   this supine projection Subcutaneous emphysema noted laterally corresponding to recent CT abnormality.      9/1/23 : CT Abdomen and Pelvis w/ Oral Cont (09.01.23 @ 15:20) Status post colectomy with ileorectal anastomosis.    Fluid throughout non- obstructed bowel and rectum, consistent with enteritis.    Extensive subcutaneous emphysema in the anterior abdominal wall. While findings may be postoperative given the history of recent surgery, in the   appropriate clinical context, necrotizing infection may be considered. Careful clinical evaluation is therefore warranted.    These findings were discussed with Dr. MARQUITA MORFIN [phone] at  9/1/2023 3:43 PM with readback.      8/31/23 : Xray Chest 1 View- PORTABLE-Urgent (Xray Chest 1 View- PORTABLE-Urgent .) (08.31.23 @ 23:17) No acute pulmonary disease.        MICROBIOLOGY DATA:    Clostridium difficile Toxin by PCR (09.02.23 @ 05:16)   C Diff by PCR Result: NotDetec:     Culture - Stool (09.01.23 @ 03:30)   Specimen Source: .Stool Feces  Culture Results:   No enteric pathogens to date: Final culture pending    GI PCR Panel Stool (09.01.23 @ 03:30)   GI PCR Panel: NotDetec:     Urine Microscopic-Add On (NC) (09.01.23 @ 03:10)   Red Blood Cell - Urine: 0 /HPF  White Blood Cell - Urine: 4 /HPF  Bacteria: Many /HPF  Comment - Urine: few amorphous urates  Squamous Epithelial Cells: PresentRapid HIV-1/2 Antibody (08.31.23 @ 21:30)   Rapid HIV-1/2 Antibody: Nonreact:                               Patient is seen and examined at the bed side, is afebrile. She is feeling better, tolerating clear liquid okay.      REVIEW OF SYSTEMS: All other review systems are negative      ALLERGIES: Zosyn ( Swelling of lips and itching)       Vital Signs Last 24 Hrs  T(C): 36.5 (04 Sep 2023 05:27), Max: 36.5 (03 Sep 2023 21:17)  T(F): 97.7 (04 Sep 2023 05:27), Max: 97.7 (03 Sep 2023 21:17)  HR: 66 (04 Sep 2023 05:27) (66 - 74)  BP: 116/70 (04 Sep 2023 05:27) (108/65 - 116/70)  BP(mean): --  RR: 17 (04 Sep 2023 05:27) (17 - 18)  SpO2: 95% (04 Sep 2023 05:27) (95% - 95%)    Parameters below as of 04 Sep 2023 05:27  Patient On (Oxygen Delivery Method): room air      PHYSICAL EXAM:  GENERAL: Not in distress   CHEST/LUNG: Not using accessory muscles  HEART: s1 and s2 present  ABDOMEN:  Nontender and incision site healed  EXTREMITIES: No pedal  edema  CNS: Awake and Alert      LABS:                        11.4   12.80 )-----------( 602      ( 04 Sep 2023 07:24 )             35.8                           12.0   11.54 )-----------( 660      ( 03 Sep 2023 07:43 )             36.9                      09-04    141  |  109<H>  |  21<H>  ----------------------------<  116<H>  3.2<L>   |  23  |  0.74    Ca    8.2<L>      04 Sep 2023 07:24      09-03    139  |  107  |  15  ----------------------------<  140<H>  3.1<L>   |  23  |  0.75    Ca    9.1      03 Sep 2023 07:43      TPro  6.0  /  Alb  2.1<L>  /  TBili  0.4  /  DBili  x   /  AST  8<L>  /  ALT  23  /  AlkPhos  103  08-31      Urinalysis Basic - ( 01 Sep 2023 08:24 )  Color: x / Appearance: x / SG: x / pH: x  Gluc: 102 mg/dL / Ketone: x  / Bili: x / Urobili: x   Blood: x / Protein: x / Nitrite: x   Leuk Esterase: x / RBC: x / WBC x   Sq Epi: x / Non Sq Epi: x / Bacteria: x        MEDICATIONS  (STANDING):    apixaban 5 milliGRAM(s) Oral every 12 hours  aspirin  chewable 81 milliGRAM(s) Oral daily  atorvastatin 20 milliGRAM(s) Oral at bedtime  gabapentin 100 milliGRAM(s) Oral every 8 hours  levETIRAcetam 750 milliGRAM(s) Oral two times a day  levothyroxine 88 MICROGram(s) Oral daily  melatonin 5 milliGRAM(s) Oral at bedtime  oxybutynin 10 milliGRAM(s) Oral daily  pantoprazole    Tablet 40 milliGRAM(s) Oral before breakfast        RADIOLOGY & ADDITIONAL TESTS:    9/2/23 : Xray Abdomen 1 View (09.02.23 @ 10:24) Markedly distended small bowel loops throughout the abdomen   and pelvis is similar to recent CT. The CT demonstrates a status post colectomy and ileorectal anastomosis. Evaluation for free air limited on   this supine projection Subcutaneous emphysema noted laterally corresponding to recent CT abnormality.      9/1/23 : CT Abdomen and Pelvis w/ Oral Cont (09.01.23 @ 15:20) Status post colectomy with ileorectal anastomosis.    Fluid throughout non- obstructed bowel and rectum, consistent with enteritis.    Extensive subcutaneous emphysema in the anterior abdominal wall. While findings may be postoperative given the history of recent surgery, in the   appropriate clinical context, necrotizing infection may be considered. Careful clinical evaluation is therefore warranted.    These findings were discussed with Dr. MARQUITA MORFIN [phone] at  9/1/2023 3:43 PM with readback.      8/31/23 : Xray Chest 1 View- PORTABLE-Urgent (Xray Chest 1 View- PORTABLE-Urgent .) (08.31.23 @ 23:17) No acute pulmonary disease.        MICROBIOLOGY DATA:    Clostridium difficile Toxin by PCR (09.02.23 @ 05:16)   C Diff by PCR Result: NotDetec:     Culture - Stool (09.01.23 @ 03:30)   Specimen Source: .Stool Feces  Culture Results:   No enteric pathogens to date: Final culture pending    GI PCR Panel Stool (09.01.23 @ 03:30)   GI PCR Panel: NotDetec:     Urine Microscopic-Add On (NC) (09.01.23 @ 03:10)   Red Blood Cell - Urine: 0 /HPF  White Blood Cell - Urine: 4 /HPF  Bacteria: Many /HPF  Comment - Urine: few amorphous urates  Squamous Epithelial Cells: PresentRapid HIV-1/2 Antibody (08.31.23 @ 21:30)   Rapid HIV-1/2 Antibody: Nonreact:                               Patient is seen and examined at the bed side, is afebrile. She is feeling better, tolerating clear liquid okay.      REVIEW OF SYSTEMS: All other review systems are negative      ALLERGIES: Zosyn ( Swelling of lips and itching)       Vital Signs Last 24 Hrs  T(C): 36.5 (04 Sep 2023 05:27), Max: 36.5 (03 Sep 2023 21:17)  T(F): 97.7 (04 Sep 2023 05:27), Max: 97.7 (03 Sep 2023 21:17)  HR: 66 (04 Sep 2023 05:27) (66 - 74)  BP: 116/70 (04 Sep 2023 05:27) (108/65 - 116/70)  BP(mean): --  RR: 17 (04 Sep 2023 05:27) (17 - 18)  SpO2: 95% (04 Sep 2023 05:27) (95% - 95%)    Parameters below as of 04 Sep 2023 05:27  Patient On (Oxygen Delivery Method): room air      PHYSICAL EXAM:  GENERAL: Not in distress   CHEST/LUNG: Not using accessory muscles  HEART: s1 and s2 present  ABDOMEN:  Nontender and incision site healed  EXTREMITIES: No pedal  edema  CNS: Awake and Alert      LABS:                        11.4   12.80 )-----------( 602      ( 04 Sep 2023 07:24 )             35.8                           12.0   11.54 )-----------( 660      ( 03 Sep 2023 07:43 )             36.9                      09-04    141  |  109<H>  |  21<H>  ----------------------------<  116<H>  3.2<L>   |  23  |  0.74    Ca    8.2<L>      04 Sep 2023 07:24      09-03    139  |  107  |  15  ----------------------------<  140<H>  3.1<L>   |  23  |  0.75    Ca    9.1      03 Sep 2023 07:43      TPro  6.0  /  Alb  2.1<L>  /  TBili  0.4  /  DBili  x   /  AST  8<L>  /  ALT  23  /  AlkPhos  103  08-31      Urinalysis Basic - ( 01 Sep 2023 08:24 )  Color: x / Appearance: x / SG: x / pH: x  Gluc: 102 mg/dL / Ketone: x  / Bili: x / Urobili: x   Blood: x / Protein: x / Nitrite: x   Leuk Esterase: x / RBC: x / WBC x   Sq Epi: x / Non Sq Epi: x / Bacteria: x        MEDICATIONS  (STANDING):    apixaban 5 milliGRAM(s) Oral every 12 hours  aspirin  chewable 81 milliGRAM(s) Oral daily  atorvastatin 20 milliGRAM(s) Oral at bedtime  gabapentin 100 milliGRAM(s) Oral every 8 hours  levETIRAcetam 750 milliGRAM(s) Oral two times a day  levothyroxine 88 MICROGram(s) Oral daily  melatonin 5 milliGRAM(s) Oral at bedtime  oxybutynin 10 milliGRAM(s) Oral daily  pantoprazole    Tablet 40 milliGRAM(s) Oral before breakfast        RADIOLOGY & ADDITIONAL TESTS:    9/2/23 : Xray Abdomen 1 View (09.02.23 @ 10:24) Markedly distended small bowel loops throughout the abdomen   and pelvis is similar to recent CT. The CT demonstrates a status post colectomy and ileorectal anastomosis. Evaluation for free air limited on   this supine projection Subcutaneous emphysema noted laterally corresponding to recent CT abnormality.      9/1/23 : CT Abdomen and Pelvis w/ Oral Cont (09.01.23 @ 15:20) Status post colectomy with ileorectal anastomosis.    Fluid throughout non- obstructed bowel and rectum, consistent with enteritis.    Extensive subcutaneous emphysema in the anterior abdominal wall. While findings may be postoperative given the history of recent surgery, in the   appropriate clinical context, necrotizing infection may be considered. Careful clinical evaluation is therefore warranted.    These findings were discussed with Dr. MARQUITA MORFIN [phone] at  9/1/2023 3:43 PM with readback.      8/31/23 : Xray Chest 1 View- PORTABLE-Urgent (Xray Chest 1 View- PORTABLE-Urgent .) (08.31.23 @ 23:17) No acute pulmonary disease.        MICROBIOLOGY DATA:    Clostridium difficile Toxin by PCR (09.02.23 @ 05:16)   C Diff by PCR Result: NotDetec:     Culture - Stool (09.01.23 @ 03:30)   Specimen Source: .Stool Feces  Culture Results:   No enteric pathogens to date: Final culture pending    GI PCR Panel Stool (09.01.23 @ 03:30)   GI PCR Panel: NotDetec:     Urine Microscopic-Add On (NC) (09.01.23 @ 03:10)   Red Blood Cell - Urine: 0 /HPF  White Blood Cell - Urine: 4 /HPF  Bacteria: Many /HPF  Comment - Urine: few amorphous urates  Squamous Epithelial Cells: PresentRapid HIV-1/2 Antibody (08.31.23 @ 21:30)   Rapid HIV-1/2 Antibody: Nonreact:

## 2023-09-04 NOTE — PROGRESS NOTE ADULT - SUBJECTIVE AND OBJECTIVE BOX
[   ] ICU                                          [   ] CCU                                      [ X  ] Medical Floor      Patient is a 78 year old female with persistent diarrhea. GI consulted to evaluate.         Patient is a 78 year old female with past medical history significant for transverse colon neoplasm s/p colectomy in [facility] in early August 2023, HTN, HLD, CAD s/p LHC with TALI to RCA, Atrial fibrillation s/p watchman implant (3/23), hypothyroidism, PUJA, and TIA presented to the emergency room with 2 days history of lightheaded and feels lethargic. Patient reports when her blood pressure was checked in the facility it was in the low 80s. Patient reports that since her surgery earlier in August she is not eating much at all. She reports that since the surgery she has had diarrhea. Patient reports she used to have 5-6 episodes of loose watery bowel movements every day and now have improved to 2-3 bowel movements daily. Patient reports due to fear of worsening diarrhea she has decreased diet and oral intake. Patient reports she only takes soup and anything heavy makes her diarrhea worse associated with intermittent, diffuse, non radiating, 3/10 intensity, crampy abdominal pain. . Patient denies nausea, vomiting, hematemesis, hematochezia, melena, fever, chills, chest pain, SOB, palpitation, cough, hematuria, dysuria, recent traveling.        Patient is comfortable. No new complaints reported, No abdominal pain, N/V, hematemesis, hematochezia, melena, fever, chills, chest pain, SOB, cough or diarrhea reported.      PAIN MANAGEMENT:  Pain Scale:                 3/10  Pain Location:  Diffuse crampy abdominal pain       PAST MEDICAL HISTORY    Atrial fibrillation    Hyperlipidemia    Hypertension    CAD (coronary artery disease)    Colon cancer    Hypothyroidism    Obstructive sleep apnea    Transient ischemic attack (TIA)             PAST SURGICAL HISTORY     Colectomy        Allergies    No Known Allergies    Intolerances  None       SOCIAL HISTORY  Advanced Directives:       [ X ] Full Code       [  ] DNR  Marital Status:         [  ] M      [ X ] S      [  ] D       [  ] W  Children:       [ X ] Yes      [  ] No  Occupation:        [  ] Employed       [ X ] Unemployed       [  ] Retired  Diet:       [ X ] Regular       [  ] PEG feeding          [  ] NG tube feeding  Drug Use:           [  ] Patient denied          [  ] Yes  Alcohol:           [ X ] No             [  ] Yes (socially)         [  ] Yes (chronic)  Tobacco:           [  ] Yes           [ X ] No      FAMILY HISTORY  [ X ] Heart Disease            [ X ] Diabetes             [ X ] HTN             [  ] Colon Cancer             [  ] Stomach Cancer              [  ] Pancreatic Cancer      VITALS  Vital Signs Last 24 Hrs  T(C): 36.5 (04 Sep 2023 05:27), Max: 36.8 (03 Sep 2023 12:55)  T(F): 97.7 (04 Sep 2023 05:27), Max: 98.2 (03 Sep 2023 12:55)  HR: 66 (04 Sep 2023 05:27) (66 - 88)  BP: 116/70 (04 Sep 2023 05:27) (108/65 - 120/83)   RR: 17 (04 Sep 2023 05:27) (17 - 20)  SpO2: 95% (04 Sep 2023 05:27) (95% - 95%)  Parameters below as of 04 Sep 2023 05:27  Patient On (Oxygen Delivery Method): room air       MEDICATIONS  (STANDING):  apixaban 5 milliGRAM(s) Oral every 12 hours  aspirin  chewable 81 milliGRAM(s) Oral daily  atorvastatin 20 milliGRAM(s) Oral at bedtime  dextrose 5% + sodium chloride 0.9%. 1000 milliLiter(s) (80 mL/Hr) IV Continuous <Continuous>  gabapentin 100 milliGRAM(s) Oral every 8 hours  levETIRAcetam 750 milliGRAM(s) Oral two times a day  levothyroxine 88 MICROGram(s) Oral daily  melatonin 5 milliGRAM(s) Oral at bedtime  oxybutynin 10 milliGRAM(s) Oral daily  pantoprazole    Tablet 40 milliGRAM(s) Oral before breakfast  potassium chloride  10 mEq/100 mL IVPB 10 milliEquivalent(s) IV Intermittent every 1 hour    MEDICATIONS  (PRN):  acetaminophen     Tablet .. 650 milliGRAM(s) Oral every 6 hours PRN Temp greater or equal to 38C (100.4F), Mild Pain (1 - 3)  albuterol    90 MICROgram(s) HFA Inhaler 2 Puff(s) Inhalation every 6 hours PRN Shortness of Breath and/or Wheezing  ALPRAZolam 0.25 milliGRAM(s) Oral two times a day PRN Anxiety/insomnia  metoclopramide Injectable 10 milliGRAM(s) IV Push every 8 hours PRN nausea, vomiting  ondansetron Injectable 4 milliGRAM(s) IV Push every 6 hours PRN Nausea and/or Vomiting  simethicone 80 milliGRAM(s) Chew four times a day PRN Gas                            11.4   12.80 )-----------( 602      ( 04 Sep 2023 07:24 )             35.8       09-04    141  |  109<H>  |  21<H>  ----------------------------<  116<H>  3.2<L>   |  23  |  0.74    Ca    8.2<L>      04 Sep 2023 07:24

## 2023-09-04 NOTE — DISCHARGE NOTE PROVIDER - NSDCCPCAREPLAN_GEN_ALL_CORE_FT
PRINCIPAL DISCHARGE DIAGNOSIS  Diagnosis: Diarrhea  Assessment and Plan of Treatment: You came in because of worsening diarrhea, lightheadedness, dizziness, and low blood pressure.  Your blood pressure improved with intravenous fluids.  CT scan of the abdomen showed possible enteritis which is inflammation of the bowel.  You were seen by the Surgery team and they recommended conservative management without antibiotics.  You were also seen by the Infectious diseas team.  Your symptoms improved with bowel rest and intravenous fluids.      SECONDARY DISCHARGE DIAGNOSES  Diagnosis: Diarrhea  Assessment and Plan of Treatment:      PRINCIPAL DISCHARGE DIAGNOSIS  Diagnosis: Small bowel obstruction  Assessment and Plan of Treatment: You had a repeat Abdominal xray on 9/8 which confirmed a small bowel obstruction. You were under the care of the gastroenterology and surgical team. You were transfer to [facility] for continued care of this condition      SECONDARY DISCHARGE DIAGNOSES  Diagnosis: Diarrhea  Assessment and Plan of Treatment: You came in because of worsening diarrhea, lightheadedness, dizziness, and low blood pressure.  Your blood pressure improved with intravenous fluids.  CT scan of the abdomen showed possible enteritis which is inflammation of the bowel.  You were seen by the Surgery team and they recommended conservative management without antibiotics.  You were also seen by the Infectious diseas team.  Your symptoms improved with bowel rest and intravenous fluids.    Diagnosis: Diarrhea  Assessment and Plan of Treatment:      PRINCIPAL DISCHARGE DIAGNOSIS  Diagnosis: Small bowel obstruction  Assessment and Plan of Treatment: You had a repeat Abdominal xray on 9/8 which confirmed a small bowel obstruction. You were under the care of the gastroenterology and surgical team. You were transfer to [facility] for continued care of this condition      SECONDARY DISCHARGE DIAGNOSES  Diagnosis: Diarrhea  Assessment and Plan of Treatment: You came in because of worsening diarrhea, lightheadedness, dizziness, and low blood pressure.  Your blood pressure improved with intravenous fluids.  CT scan of the abdomen showed possible enteritis which is inflammation of the bowel.  You were seen by the Surgery team and they recommended conservative management without antibiotics.  You were also seen by the Infectious diseas team.  Your symptoms improved with bowel rest and intravenous fluids.    Diagnosis: Diarrhea  Assessment and Plan of Treatment:      PRINCIPAL DISCHARGE DIAGNOSIS  Diagnosis: Small bowel obstruction  Assessment and Plan of Treatment: You had a repeat Abdominal xray on 9/8 which confirmed a small bowel obstruction. You were under the care of the gastroenterology and surgical team. You were transfer to [facility] for continued care of this condition      SECONDARY DISCHARGE DIAGNOSES  Diagnosis: Diarrhea  Assessment and Plan of Treatment: You came in because of worsening diarrhea, lightheadedness, dizziness, and low blood pressure.  Your blood pressure improved with intravenous fluids.  CT scan of the abdomen showed possible enteritis which is inflammation of the bowel.  You were seen by the Surgery team and they recommended conservative management without antibiotics.  You were also seen by the Infectious diseas team.  Your symptoms improved with bowel rest and intravenous fluids.    Diagnosis: Diarrhea  Assessment and Plan of Treatment:

## 2023-09-04 NOTE — PROGRESS NOTE ADULT - SUBJECTIVE AND OBJECTIVE BOX
Date of Service 09-04-23 @ 13:10    CHIEF COMPLAINT:Patient is a 78y old  Female who presents with a chief complaint of Hypotension.Pt tolerating liquid diet.      	  REVIEW OF SYSTEMS:  CONSTITUTIONAL: No fever, weight loss, or fatigue  EYES: No eye pain, visual disturbances, or discharge  ENT:  No difficulty hearing, tinnitus, vertigo; No sinus or throat pain  NECK: No pain or stiffness  RESPIRATORY: No cough, wheezing, chills or hemoptysis; No Shortness of Breath  CARDIOVASCULAR: No chest pain, palpitations, passing out, dizziness, or leg swelling  GASTROINTESTINAL: No abdominal or epigastric pain. No nausea, vomiting, or hematemesis; No diarrhea or constipation. No melena or hematochezia.  GENITOURINARY: No dysuria, frequency, hematuria, or incontinence  NEUROLOGICAL: No headaches, memory loss, loss of strength, numbness, or tremors  SKIN: No itching, burning, rashes, or lesions   LYMPH Nodes: No enlarged glands  ENDOCRINE: No heat or cold intolerance; No hair loss  MUSCULOSKELETAL: No joint pain or swelling; No muscle, back, or extremity pain  PSYCHIATRIC: No depression, anxiety, mood swings, or difficulty sleeping  HEME/LYMPH: No easy bruising, or bleeding gums  ALLERGY AND IMMUNOLOGIC: No hives or eczema	      PHYSICAL EXAM:  T(C): 36.5 (09-04-23 @ 05:27), Max: 36.5 (09-03-23 @ 21:17)  HR: 66 (09-04-23 @ 05:27) (66 - 74)  BP: 116/70 (09-04-23 @ 05:27) (108/65 - 116/70)  RR: 17 (09-04-23 @ 05:27) (17 - 18)  SpO2: 95% (09-04-23 @ 05:27) (95% - 95%)  Wt(kg): --  I&O's Summary    03 Sep 2023 07:01  -  04 Sep 2023 07:00  --------------------------------------------------------  IN: 0 mL / OUT: 1 mL / NET: -1 mL        Appearance: Normal	  HEENT:   Normal oral mucosa, PERRL, EOMI	  Lymphatic: No lymphadenopathy  Cardiovascular: Normal S1 S2, No JVD, No murmurs, No edema  Respiratory: Lungs clear to auscultation	  Psychiatry: A & O x 3, Mood & affect appropriate  Gastrointestinal:  Soft, Non-tender, + BS	  Skin: No rashes, No ecchymoses, No cyanosis	  Neurologic: Non-focal  Extremities: Normal range of motion, No clubbing, cyanosis or edema  Vascular: Peripheral pulses palpable 2+ bilaterally    MEDICATIONS  (STANDING):  apixaban 5 milliGRAM(s) Oral every 12 hours  aspirin  chewable 81 milliGRAM(s) Oral daily  atorvastatin 20 milliGRAM(s) Oral at bedtime  gabapentin 100 milliGRAM(s) Oral every 8 hours  levETIRAcetam 750 milliGRAM(s) Oral two times a day  levothyroxine 88 MICROGram(s) Oral daily  melatonin 5 milliGRAM(s) Oral at bedtime  oxybutynin 10 milliGRAM(s) Oral daily  pantoprazole    Tablet 40 milliGRAM(s) Oral before breakfast        LABS:	 	                      11.4   12.80 )-----------( 602      ( 04 Sep 2023 07:24 )             35.8     09-04    141  |  109<H>  |  21<H>  ----------------------------<  116<H>  3.2<L>   |  23  |  0.74    Ca    8.2<L>      04 Sep 2023 07:24      proBNP:   Lipid Profile: Cholesterol 87  LDL --  HDL 55  TG 74  Ldl calc 17  Ratio --    HgA1c:   TSH: Thyroid Stimulating Hormone, Serum: 0.84 uU/mL (09-02 @ 06:58)      	      < from: Xray Abdomen 1 View (09.02.23 @ 10:24) >  ACC: [id number] EXAM:  XR ABDOMEN 1 VIEW   ORDERED BY: SANTI JULIAN DATE:  09/02/2023          INTERPRETATION:  Follow-up.    AP supine abdomen. Comparison to CT of 9/1/2023.    IMPRESSION: Markedly distended small bowel loops throughout the abdomen   and pelvis is similar to recent CT. The CT demonstrates a status post   colectomy and ileorectal anastomosis. Evaluation for free air limited on   this supine projection Subcutaneous emphysema noted laterally   corresponding to recent CT abnormality.      < end of copied text >     Date of Service 09-04-23 @ 13:10    CHIEF COMPLAINT:Patient is a 78y old  Female who presents with a chief complaint of Hypotension.Pt tolerating liquid diet.      	  REVIEW OF SYSTEMS:  CONSTITUTIONAL: No fever, weight loss, or fatigue  EYES: No eye pain, visual disturbances, or discharge  ENT:  No difficulty hearing, tinnitus, vertigo; No sinus or throat pain  NECK: No pain or stiffness  RESPIRATORY: No cough, wheezing, chills or hemoptysis; No Shortness of Breath  CARDIOVASCULAR: No chest pain, palpitations, passing out, dizziness, or leg swelling  GASTROINTESTINAL: No abdominal or epigastric pain. No nausea, vomiting, or hematemesis; No diarrhea or constipation. No melena or hematochezia.  GENITOURINARY: No dysuria, frequency, hematuria, or incontinence  NEUROLOGICAL: No headaches, memory loss, loss of strength, numbness, or tremors  SKIN: No itching, burning, rashes, or lesions   LYMPH Nodes: No enlarged glands  ENDOCRINE: No heat or cold intolerance; No hair loss  MUSCULOSKELETAL: No joint pain or swelling; No muscle, back, or extremity pain  PSYCHIATRIC: No depression, anxiety, mood swings, or difficulty sleeping  HEME/LYMPH: No easy bruising, or bleeding gums  ALLERGY AND IMMUNOLOGIC: No hives or eczema	      PHYSICAL EXAM:  T(C): 36.5 (09-04-23 @ 05:27), Max: 36.5 (09-03-23 @ 21:17)  HR: 66 (09-04-23 @ 05:27) (66 - 74)  BP: 116/70 (09-04-23 @ 05:27) (108/65 - 116/70)  RR: 17 (09-04-23 @ 05:27) (17 - 18)  SpO2: 95% (09-04-23 @ 05:27) (95% - 95%)  Wt(kg): --  I&O's Summary    03 Sep 2023 07:01  -  04 Sep 2023 07:00  --------------------------------------------------------  IN: 0 mL / OUT: 1 mL / NET: -1 mL        Appearance: Normal	  HEENT:   Normal oral mucosa, PERRL, EOMI	  Lymphatic: No lymphadenopathy  Cardiovascular: Normal S1 S2, No JVD, No murmurs, No edema  Respiratory: Lungs clear to auscultation	  Psychiatry: A & O x 3, Mood & affect appropriate  Gastrointestinal:  Soft, Non-tender, + BS	  Skin: No rashes, No ecchymoses, No cyanosis	  Neurologic: Non-focal  Extremities: Normal range of motion, No clubbing, cyanosis or edema  Vascular: Peripheral pulses palpable 2+ bilaterally    MEDICATIONS  (STANDING):  apixaban 5 milliGRAM(s) Oral every 12 hours  aspirin  chewable 81 milliGRAM(s) Oral daily  atorvastatin 20 milliGRAM(s) Oral at bedtime  gabapentin 100 milliGRAM(s) Oral every 8 hours  levETIRAcetam 750 milliGRAM(s) Oral two times a day  levothyroxine 88 MICROGram(s) Oral daily  melatonin 5 milliGRAM(s) Oral at bedtime  oxybutynin 10 milliGRAM(s) Oral daily  pantoprazole    Tablet 40 milliGRAM(s) Oral before breakfast        LABS:	 	                      11.4   12.80 )-----------( 602      ( 04 Sep 2023 07:24 )             35.8     09-04    141  |  109<H>  |  21<H>  ----------------------------<  116<H>  3.2<L>   |  23  |  0.74    Ca    8.2<L>      04 Sep 2023 07:24      proBNP:   Lipid Profile: Cholesterol 87  LDL --  HDL 55  TG 74  Ldl calc 17  Ratio --    HgA1c:   TSH: Thyroid Stimulating Hormone, Serum: 0.84 uU/mL (09-02 @ 06:58)      	      < from: Xray Abdomen 1 View (09.02.23 @ 10:24) >  ACC: [id number] EXAM:  XR ABDOMEN 1 VIEW   ORDERED BY: SANTI JULIAN DATE:  09/02/2023          INTERPRETATION:  Follow-up.    AP supine abdomen. Comparison to CT of 9/1/2023.    IMPRESSION: Markedly distended small bowel loops throughout the abdomen   and pelvis is similar to recent CT. The CT demonstrates a status post   colectomy and ileorectal anastomosis. Evaluation for free air limited on   this supine projection Subcutaneous emphysema noted laterally   corresponding to recent CT abnormality.      < end of copied text >     Date of Service 09-04-23 @ 13:10    CHIEF COMPLAINT:Patient is a 78y old  Female who presents with a chief complaint of Hypotension.Pt tolerating liquid diet.      	  REVIEW OF SYSTEMS:  CONSTITUTIONAL: No fever, weight loss, or fatigue  EYES: No eye pain, visual disturbances, or discharge  ENT:  No difficulty hearing, tinnitus, vertigo; No sinus or throat pain  NECK: No pain or stiffness  RESPIRATORY: No cough, wheezing, chills or hemoptysis; No Shortness of Breath  CARDIOVASCULAR: No chest pain, palpitations, passing out, dizziness, or leg swelling  GASTROINTESTINAL: No abdominal or epigastric pain. No nausea, vomiting, or hematemesis; No diarrhea or constipation. No melena or hematochezia.  GENITOURINARY: No dysuria, frequency, hematuria, or incontinence  NEUROLOGICAL: No headaches, memory loss, loss of strength, numbness, or tremors  SKIN: No itching, burning, rashes, or lesions   LYMPH Nodes: No enlarged glands  ENDOCRINE: No heat or cold intolerance; No hair loss  MUSCULOSKELETAL: No joint pain or swelling; No muscle, back, or extremity pain  PSYCHIATRIC: No depression, anxiety, mood swings, or difficulty sleeping  HEME/LYMPH: No easy bruising, or bleeding gums  ALLERGY AND IMMUNOLOGIC: No hives or eczema	      PHYSICAL EXAM:  T(C): 36.5 (09-04-23 @ 05:27), Max: 36.5 (09-03-23 @ 21:17)  HR: 66 (09-04-23 @ 05:27) (66 - 74)  BP: 116/70 (09-04-23 @ 05:27) (108/65 - 116/70)  RR: 17 (09-04-23 @ 05:27) (17 - 18)  SpO2: 95% (09-04-23 @ 05:27) (95% - 95%)  Wt(kg): --  I&O's Summary    03 Sep 2023 07:01  -  04 Sep 2023 07:00  --------------------------------------------------------  IN: 0 mL / OUT: 1 mL / NET: -1 mL        Appearance: Normal	  HEENT:   Normal oral mucosa, PERRL, EOMI	  Lymphatic: No lymphadenopathy  Cardiovascular: Normal S1 S2, No JVD, No murmurs, No edema  Respiratory: Lungs clear to auscultation	  Psychiatry: A & O x 3, Mood & affect appropriate  Gastrointestinal:  Soft, Non-tender, + BS	  Skin: No rashes, No ecchymoses, No cyanosis	  Neurologic: Non-focal  Extremities: Normal range of motion, No clubbing, cyanosis or edema  Vascular: Peripheral pulses palpable 2+ bilaterally    MEDICATIONS  (STANDING):  apixaban 5 milliGRAM(s) Oral every 12 hours  aspirin  chewable 81 milliGRAM(s) Oral daily  atorvastatin 20 milliGRAM(s) Oral at bedtime  gabapentin 100 milliGRAM(s) Oral every 8 hours  levETIRAcetam 750 milliGRAM(s) Oral two times a day  levothyroxine 88 MICROGram(s) Oral daily  melatonin 5 milliGRAM(s) Oral at bedtime  oxybutynin 10 milliGRAM(s) Oral daily  pantoprazole    Tablet 40 milliGRAM(s) Oral before breakfast        LABS:	 	                      11.4   12.80 )-----------( 602      ( 04 Sep 2023 07:24 )             35.8     09-04    141  |  109<H>  |  21<H>  ----------------------------<  116<H>  3.2<L>   |  23  |  0.74    Ca    8.2<L>      04 Sep 2023 07:24      proBNP:   Lipid Profile: Cholesterol 87  LDL --  HDL 55  TG 74  Ldl calc 17  Ratio --    HgA1c:   TSH: Thyroid Stimulating Hormone, Serum: 0.84 uU/mL (09-02 @ 06:58)      	      < from: Xray Abdomen 1 View (09.02.23 @ 10:24) >  ACC: [id number] EXAM:  XR ABDOMEN 1 VIEW   ORDERED BY: SANTI JULIAN DATE:  09/02/2023          INTERPRETATION:  Follow-up.    AP supine abdomen. Comparison to CT of 9/1/2023.    IMPRESSION: Markedly distended small bowel loops throughout the abdomen   and pelvis is similar to recent CT. The CT demonstrates a status post   colectomy and ileorectal anastomosis. Evaluation for free air limited on   this supine projection Subcutaneous emphysema noted laterally   corresponding to recent CT abnormality.      < end of copied text >

## 2023-09-04 NOTE — PROGRESS NOTE ADULT - ASSESSMENT
Patient is a 78y old  Female with PMH of transverse colon neoplasm s/p colectomy in [facility] in early August, HTN, HLD, CAD s/p LHC with TALI to RCA, Atrial fibrillation s/p watchman implant (3/23), hypothyroidism, PUJA (not on cpap), TIA, now presents to the ER for evaluation of weak, tired, lightheaded and low blood pressure, in the low 80s. Patient reports that since her surgery earlier in August she is not eating much at all and  has had diarrhea. She used to have 5-6 episodes of loose watery bowel movements every day and now have improved to 2-3 bowel movements daily. She has no fever or chills. ON Admission, she found to have no fever but Low Blood pressure, BP of 93/62. The stool studies are in process. The ID consult requested to assist with further evaluation and antibiotic management.     # Hypotension - resolved  # R/O Infectious etiology of diarrhea- C.difficile PCR and GI pathogen PCR is negative   #Enteritis and Necrotizing  fasciitis - CT abd/pelvis shows " Extensive subcutaneous emphysema in the anterior abdominal wall. While findings may be postoperative given the history of recent surgery, in the appropriate clinical context, necrotizing infection may be considered."  - As per surgery " Extensive subcutaneous emphysema in the anterior abdominal wall most likely related to recent surgery, necrotizing fascitis very unlikely."     would recommend:    1.  Abdominal  Xray to reassess  2.  NPO, IVF with D5  3. Monitor oFF Abx since Surgery recommended "  Extensive subcutaneous emphysema in the anterior abdominal wall most likely related to recent surgery, necrotizing fascitis very unlikely."   4. Antiemetic agents     d/w Nursing staff       Attending Attestation:    Spent more than 35 minutes on total encounter, more than 50 % of the visit was spent counseling and/or coordinating care by the Attending physician.         Patient is a 78y old  Female with PMH of transverse colon neoplasm s/p colectomy in [facility] in early August, HTN, HLD, CAD s/p LHC with TALI to RCA, Atrial fibrillation s/p watchman implant (3/23), hypothyroidism, PUJA (not on cpap), TIA, now presents to the ER for evaluation of weak, tired, lightheaded and low blood pressure, in the low 80s. Patient reports that since her surgery earlier in August she is not eating much at all and  has had diarrhea. She used to have 5-6 episodes of loose watery bowel movements every day and now have improved to 2-3 bowel movements daily. She has no fever or chills. ON Admission, she found to have no fever but Low Blood pressure, BP of 93/62. The stool studies are in process. The ID consult requested to assist with further evaluation and antibiotic management.     # Hypotension - resolved  # R/O Infectious etiology of diarrhea- C.difficile PCR and GI pathogen PCR is negative   #Enteritis and Necrotizing  fasciitis - CT abd/pelvis shows " Extensive subcutaneous emphysema in the anterior abdominal wall. While findings may be postoperative given the history of recent surgery, in the appropriate clinical context, necrotizing infection may be considered."  - As per surgery " Extensive subcutaneous emphysema in the anterior abdominal wall most likely related to recent surgery, necrotizing fascitis very unlikely."     would recommend:    1.  Abdominal  Xray to reassess  2.  NPO, IVF with D5  3. Monitor oFF Abx since Surgery recommended "  Extensive subcutaneous emphysema in the anterior abdominal wall most likely related to recent surgery, necrotizing fascitis very unlikely."   4. Antiemetic agents     d/w Nursing staff       Attending Attestation:    Spent more than 35 minutes on total encounter, more than 50 % of the visit was spent counseling and/or coordinating care by the Attending physician.         Patient is a 78y old  Female with PMH of transverse colon neoplasm s/p colectomy in [facility] in early August, HTN, HLD, CAD s/p LHC with TALI to RCA, Atrial fibrillation s/p watchman implant (3/23), hypothyroidism, PUJA (not on cpap), TIA, now presents to the ER for evaluation of weak, tired, lightheaded and low blood pressure, in the low 80s. Patient reports that since her surgery earlier in August she is not eating much at all and  has had diarrhea. She used to have 5-6 episodes of loose watery bowel movements every day and now have improved to 2-3 bowel movements daily. She has no fever or chills. ON Admission, she found to have no fever but Low Blood pressure, BP of 93/62. The stool studies are in process. The ID consult requested to assist with further evaluation and antibiotic management.     # Hypotension - resolved  # R/O Infectious etiology of diarrhea- C.difficile PCR and GI pathogen PCR is negative   #Enteritis and Necrotizing  fasciitis - CT abd/pelvis shows " Extensive subcutaneous emphysema in the anterior abdominal wall. While findings may be postoperative given the history of recent surgery, in the appropriate clinical context, necrotizing infection may be considered."  - As per surgery " Extensive subcutaneous emphysema in the anterior abdominal wall most likely related to recent surgery, necrotizing fascitis very unlikely."     would recommend:    1.  Abdominal  Xray to reassess  2.  NPO, IVF with D5  3. Monitor oFF Abx since Surgery recommended "  Extensive subcutaneous emphysema in the anterior abdominal wall most likely related to recent surgery, necrotizing fascitis very unlikely."   4. Antiemetic agents     d/w Nursing staff       Attending Attestation:    Spent more than 35 minutes on total encounter, more than 50 % of the visit was spent counseling and/or coordinating care by the Attending physician.       Patient is a 78y old  Female with PMH of transverse colon neoplasm s/p colectomy in [facility] in early August, HTN, HLD, CAD s/p LHC with TALI to RCA, Atrial fibrillation s/p watchman implant (3/23), hypothyroidism, PUJA (not on cpap), TIA, now presents to the ER for evaluation of weak, tired, lightheaded and low blood pressure, in the low 80s. Patient reports that since her surgery earlier in August she is not eating much at all and  has had diarrhea. She used to have 5-6 episodes of loose watery bowel movements every day and now have improved to 2-3 bowel movements daily. She has no fever or chills. ON Admission, she found to have no fever but Low Blood pressure, BP of 93/62. The stool studies are in process. The ID consult requested to assist with further evaluation and antibiotic management.     # Hypotension - resolved  # R/O Infectious etiology of diarrhea- C.difficile PCR and GI pathogen PCR is negative   #Enteritis and Necrotizing  fasciitis - CT abd/pelvis shows " Extensive subcutaneous emphysema in the anterior abdominal wall. While findings may be postoperative given the history of recent surgery, in the appropriate clinical context, necrotizing infection may be considered."  - As per surgery " Extensive subcutaneous emphysema in the anterior abdominal wall most likely related to recent surgery, necrotizing fascitis very unlikely."     would recommend:    1. Advanced diet as tolerated  2. Monitor oFF Abx since Surgery recommended "  Extensive subcutaneous emphysema in the anterior abdominal wall most likely related to recent surgery, necrotizing fascitis very unlikely."   3. OOB to chair     d/w Patient and Nursing staff     Attending Attestation:    Spent more than 35 minutes on total encounter, more than 50 % of the visit was spent counseling and/or coordinating care by the Attending physician.       Patient is a 78y old  Female with PMH of transverse colon neoplasm s/p colectomy in [facility] in early August, HTN, HLD, CAD s/p LHC with TALI to RCA, Atrial fibrillation s/p watchman implant (3/23), hypothyroidism, PUJA (not on cpap), TIA, now presents to the ER for evaluation of weak, tired, lightheaded and low blood pressure, in the low 80s. Patient reports that since her surgery earlier in August she is not eating much at all and  has had diarrhea. She used to have 5-6 episodes of loose watery bowel movements every day and now have improved to 2-3 bowel movements daily. She has no fever or chills. ON Admission, she found to have no fever but Low Blood pressure, BP of 93/62. The stool studies are in process. The ID consult requested to assist with further evaluation and antibiotic management.     # Hypotension - resolved  # R/O Infectious etiology of diarrhea- C.difficile PCR and GI pathogen PCR is negative   #Enteritis and Necrotizing  fasciitis - CT abd/pelvis shows " Extensive subcutaneous emphysema in the anterior abdominal wall. While findings may be postoperative given the history of recent surgery, in the appropriate clinical context, necrotizing infection may be considered."  - As per surgery " Extensive subcutaneous emphysema in the anterior abdominal wall most likely related to recent surgery, necrotizing fascitis very unlikely."     would recommend:    1. Advanced diet as tolerated  2. Monitor oFF Abx since Surgery recommended "  Extensive subcutaneous emphysema in the anterior abdominal wall most likely related to recent surgery, necrotizing fascitis very unlikely."   3. OOB to chair     d/w Patient and Nursing staff     Attending Attestation:    Spent more than 35 minutes on total encounter, more than 50 % of the visit was spent counseling and/or coordinating care by the Attending physician.       Patient is a 78y old  Female with PMH of transverse colon neoplasm s/p colectomy in [facility] in early August, HTN, HLD, CAD s/p LHC with TALI to RCA, Atrial fibrillation s/p watchman implant (3/23), hypothyroidism, PUJA (not on cpap), TIA, now presents to the ER for evaluation of weak, tired, lightheaded and low blood pressure, in the low 80s. Patient reports that since her surgery earlier in August she is not eating much at all and  has had diarrhea. She used to have 5-6 episodes of loose watery bowel movements every day and now have improved to 2-3 bowel movements daily. She has no fever or chills. ON Admission, she found to have no fever but Low Blood pressure, BP of 93/62. The stool studies are in process. The ID consult requested to assist with further evaluation and antibiotic management.     # Hypotension - resolved  # R/O Infectious etiology of diarrhea- C.difficile PCR and GI pathogen PCR is negative   #Enteritis and Necrotizing  fasciitis - CT abd/pelvis shows " Extensive subcutaneous emphysema in the anterior abdominal wall. While findings may be postoperative given the history of recent surgery, in the appropriate clinical context, necrotizing infection may be considered."  - As per surgery " Extensive subcutaneous emphysema in the anterior abdominal wall most likely related to recent surgery, necrotizing fascitis very unlikely."     would recommend:    1. Advanced diet as tolerated  2. Monitor oFF Abx since Surgery recommended "  Extensive subcutaneous emphysema in the anterior abdominal wall most likely related to recent surgery, necrotizing fascitis very unlikely."   3. OOB to chair     d/w Patient and Nursing staff     Attending Attestation:    Spent more than 35 minutes on total encounter, more than 50 % of the visit was spent counseling and/or coordinating care by the Attending physician.

## 2023-09-04 NOTE — DISCHARGE NOTE PROVIDER - NSDCMRMEDTOKEN_GEN_ALL_CORE_FT
Albuterol (Eqv-ProAir HFA) 90 mcg/inh inhalation aerosol: 2 puff(s) inhaled once a day  aspirin 81 mg oral capsule: 1 tab(s) orally once a day  atorvastatin 20 mg oral tablet: 1 tab(s) orally once a day (at bedtime)  Coenzyme Q10 100 mg oral capsule: 1 tab(s) orally once a day  Ditropan XL 10 mg/24 hr oral tablet, extended release: 1 tab(s) orally once a day  Eliquis 5 mg oral tablet: 1 tab(s) orally 2 times a day  gabapentin 100 mg oral capsule: 1 tab(s) orally every 8 hours  Imodium A-D 2 mg oral tablet: 1 tab(s) orally 3 times a day  Keppra 750 mg oral tablet: 1 tab(s) orally 2 times a day  levothyroxine 88 mcg (0.088 mg) oral tablet: 1 tab(s) orally once a day  losartan 25 mg oral tablet: 1 tab(s) orally once a day  melatonin 3 mg oral tablet: 1 tab(s) orally once a day  pantoprazole 40 mg oral delayed release tablet: 1 tab(s) orally once a day  Tylenol 325 mg oral tablet: 2 tab(s) orally every 8 hours  venlafaxine 75 mg oral capsule, extended release: 1 tab(s) orally once a day  Xanax 0.25 mg oral tablet: 1 tab(s) orally once a day (at bedtime)  Zetia 10 mg oral tablet: 1 tab(s) orally once a day   Albuterol (Eqv-ProAir HFA) 90 mcg/inh inhalation aerosol: 2 puff(s) inhaled once a day  aspirin 81 mg oral capsule: 1 tab(s) orally once a day  atorvastatin 20 mg oral tablet: 1 tab(s) orally once a day (at bedtime)  Coenzyme Q10 100 mg oral capsule: 1 tab(s) orally once a day  Ditropan XL 10 mg/24 hr oral tablet, extended release: 1 tab(s) orally once a day  Eliquis 5 mg oral tablet: 1 tab(s) orally 2 times a day  gabapentin 100 mg oral capsule: 1 tab(s) orally every 8 hours  Keppra 750 mg oral tablet: 1 tab(s) orally 2 times a day  levoFLOXacin 25 mg/mL intravenous solution: 10 milliliter(s) intravenous every 24 hours  levothyroxine 88 mcg (0.088 mg) oral tablet: 1 tab(s) orally once a day  melatonin 3 mg oral tablet: 1 tab(s) orally once a day  midodrine 5 mg oral tablet: 1 tab(s) orally 3 times a day  pantoprazole 40 mg oral delayed release tablet: 1 tab(s) orally once a day  Tylenol 325 mg oral tablet: 2 tab(s) orally every 8 hours  venlafaxine 75 mg oral capsule, extended release: 1 tab(s) orally once a day  Xanax 0.25 mg oral tablet: 1 tab(s) orally once a day (at bedtime)  Zetia 10 mg oral tablet: 1 tab(s) orally once a day

## 2023-09-05 DIAGNOSIS — K52.9 NONINFECTIVE GASTROENTERITIS AND COLITIS, UNSPECIFIED: ICD-10-CM

## 2023-09-05 DIAGNOSIS — D72.829 ELEVATED WHITE BLOOD CELL COUNT, UNSPECIFIED: ICD-10-CM

## 2023-09-05 DIAGNOSIS — R82.79 OTHER ABNORMAL FINDINGS ON MICROBIOLOGICAL EXAMINATION OF URINE: ICD-10-CM

## 2023-09-05 LAB
ANION GAP SERPL CALC-SCNC: 10 MMOL/L — SIGNIFICANT CHANGE UP (ref 5–17)
BUN SERPL-MCNC: 27 MG/DL — HIGH (ref 7–18)
CALCIUM SERPL-MCNC: 8.5 MG/DL — SIGNIFICANT CHANGE UP (ref 8.4–10.5)
CHLORIDE SERPL-SCNC: 108 MMOL/L — SIGNIFICANT CHANGE UP (ref 96–108)
CO2 SERPL-SCNC: 21 MMOL/L — LOW (ref 22–31)
CREAT SERPL-MCNC: 0.87 MG/DL — SIGNIFICANT CHANGE UP (ref 0.5–1.3)
EGFR: 68 ML/MIN/1.73M2 — SIGNIFICANT CHANGE UP
GLUCOSE SERPL-MCNC: 118 MG/DL — HIGH (ref 70–99)
HCT VFR BLD CALC: 36.8 % — SIGNIFICANT CHANGE UP (ref 34.5–45)
HGB BLD-MCNC: 11.9 G/DL — SIGNIFICANT CHANGE UP (ref 11.5–15.5)
MCHC RBC-ENTMCNC: 28.3 PG — SIGNIFICANT CHANGE UP (ref 27–34)
MCHC RBC-ENTMCNC: 32.3 GM/DL — SIGNIFICANT CHANGE UP (ref 32–36)
MCV RBC AUTO: 87.4 FL — SIGNIFICANT CHANGE UP (ref 80–100)
NRBC # BLD: 0 /100 WBCS — SIGNIFICANT CHANGE UP (ref 0–0)
PHOSPHATE SERPL-MCNC: 3.7 MG/DL — SIGNIFICANT CHANGE UP (ref 2.5–4.5)
PLATELET # BLD AUTO: 498 K/UL — HIGH (ref 150–400)
POTASSIUM SERPL-MCNC: 3.7 MMOL/L — SIGNIFICANT CHANGE UP (ref 3.5–5.3)
POTASSIUM SERPL-SCNC: 3.7 MMOL/L — SIGNIFICANT CHANGE UP (ref 3.5–5.3)
RBC # BLD: 4.21 M/UL — SIGNIFICANT CHANGE UP (ref 3.8–5.2)
RBC # FLD: 15.9 % — HIGH (ref 10.3–14.5)
SODIUM SERPL-SCNC: 139 MMOL/L — SIGNIFICANT CHANGE UP (ref 135–145)
WBC # BLD: 14.31 K/UL — HIGH (ref 3.8–10.5)
WBC # FLD AUTO: 14.31 K/UL — HIGH (ref 3.8–10.5)

## 2023-09-05 RX ORDER — KETOROLAC TROMETHAMINE 30 MG/ML
15 SYRINGE (ML) INJECTION ONCE
Refills: 0 | Status: DISCONTINUED | OUTPATIENT
Start: 2023-09-05 | End: 2023-09-05

## 2023-09-05 RX ADMIN — GABAPENTIN 100 MILLIGRAM(S): 400 CAPSULE ORAL at 05:11

## 2023-09-05 RX ADMIN — APIXABAN 5 MILLIGRAM(S): 2.5 TABLET, FILM COATED ORAL at 17:32

## 2023-09-05 RX ADMIN — Medication 15 MILLIGRAM(S): at 13:57

## 2023-09-05 RX ADMIN — GABAPENTIN 100 MILLIGRAM(S): 400 CAPSULE ORAL at 13:05

## 2023-09-05 RX ADMIN — SIMETHICONE 80 MILLIGRAM(S): 80 TABLET, CHEWABLE ORAL at 11:28

## 2023-09-05 RX ADMIN — ATORVASTATIN CALCIUM 20 MILLIGRAM(S): 80 TABLET, FILM COATED ORAL at 21:14

## 2023-09-05 RX ADMIN — Medication 15 MILLIGRAM(S): at 15:46

## 2023-09-05 RX ADMIN — ONDANSETRON 4 MILLIGRAM(S): 8 TABLET, FILM COATED ORAL at 01:28

## 2023-09-05 RX ADMIN — Medication 5 MILLIGRAM(S): at 21:11

## 2023-09-05 RX ADMIN — LEVETIRACETAM 750 MILLIGRAM(S): 250 TABLET, FILM COATED ORAL at 17:32

## 2023-09-05 RX ADMIN — ONDANSETRON 4 MILLIGRAM(S): 8 TABLET, FILM COATED ORAL at 20:47

## 2023-09-05 RX ADMIN — PANTOPRAZOLE SODIUM 40 MILLIGRAM(S): 20 TABLET, DELAYED RELEASE ORAL at 05:11

## 2023-09-05 RX ADMIN — Medication 10 MILLIGRAM(S): at 11:29

## 2023-09-05 RX ADMIN — Medication 81 MILLIGRAM(S): at 11:29

## 2023-09-05 RX ADMIN — APIXABAN 5 MILLIGRAM(S): 2.5 TABLET, FILM COATED ORAL at 05:11

## 2023-09-05 RX ADMIN — LEVETIRACETAM 750 MILLIGRAM(S): 250 TABLET, FILM COATED ORAL at 05:11

## 2023-09-05 RX ADMIN — GABAPENTIN 100 MILLIGRAM(S): 400 CAPSULE ORAL at 21:11

## 2023-09-05 RX ADMIN — Medication 88 MICROGRAM(S): at 05:11

## 2023-09-05 NOTE — PROGRESS NOTE ADULT - PROBLEM SELECTOR PLAN 2
UA negative  -Urine Culture 9/1 grew Enterobacter cloacae & Aerococcus species  -No Abx indicated at this time (likely contamination) UA negative  -Urine Culture 9/1 grew Enterobacter cloacae & Aerococcus species  -No Abx indicated at this time (likely colonization per ID) UA negative  -Urine Culture 9/1 grew Enterobacter cloacae & Aerococcus species  -No Abx indicated at this time (likely colonization per ID)  -f/u repeat UA

## 2023-09-05 NOTE — PROGRESS NOTE ADULT - PROBLEM SELECTOR PLAN 1
P/w diarrhea associated with weakness since early august s/p colectomy surgery.  -CT A/P as above  -CLD advanced to full liquid diet  -GI PCR, ova and parasite, and stool culture negative  -ID Dr. Monreal following  -No Abx indicated at this time

## 2023-09-05 NOTE — PROGRESS NOTE ADULT - PROBLEM SELECTOR PLAN 4
Colon cancer s/p colectomy.   Not on any chemo drugs as per NH paperwork. P/w weakness, lethargy, dizziness Likely in setting of chronic diarrhea since colectomy VS sepsis vs poor oral intake.   -SBP at facility in low 80s.   -s/ IVF hydration  -F/u orthostatic vitals.   -Blood cultures negative  -CXR - negative.

## 2023-09-05 NOTE — PROGRESS NOTE ADULT - ASSESSMENT
Patient is a 79 y/o F with PMH transverse colon neoplasm s/p colectomy in [facility] 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 Patient  p/w weakness, lethargy, poor appetite, light headedness  and diarrhea.  Patient reports when her blood pressure was checked in the facility it was in the low 80s. CT A/P showed Status post colectomy with ileorectal anastomosis.Fluid throughout nonobstructed bowel and rectum, consistent with enteritis.  Extensive subcutaneous emphysema in the anterior abdominal wall. While   findings may be postoperative given the history of recent surgery, in the appropriate clinical context, necrotizing infection may be considered.   Careful clinical evaluation is therefore warranted.  Pt. admitted for enteritis vs. necrotizing fascitis in setting of recent  robotic surgery at [facility].  Pt. followed by surgery, no interventions indicated at this time.  ID Dr. Monreal following, no antibiotics indicated at this time.  Diet advanced to full liquid. Patient is a 77 y/o F with PMH transverse colon neoplasm s/p colectomy in [facility] 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 Patient  p/w weakness, lethargy, poor appetite, light headedness  and diarrhea.  Patient reports when her blood pressure was checked in the facility it was in the low 80s. CT A/P showed Status post colectomy with ileorectal anastomosis.Fluid throughout nonobstructed bowel and rectum, consistent with enteritis.  Extensive subcutaneous emphysema in the anterior abdominal wall. While   findings may be postoperative given the history of recent surgery, in the appropriate clinical context, necrotizing infection may be considered.   Careful clinical evaluation is therefore warranted.  Pt. admitted for enteritis vs. necrotizing fascitis in setting of recent  robotic surgery at [facility].  Pt. followed by surgery, no interventions indicated at this time.  ID Dr. Monreal following, no antibiotics indicated at this time.  Diet advanced to full liquid. Patient is a 77 y/o F with PMH transverse colon neoplasm s/p colectomy in [facility] 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 Patient  p/w weakness, lethargy, poor appetite, light headedness  and diarrhea.  Patient reports when her blood pressure was checked in the facility it was in the low 80s. CT A/P showed Status post colectomy with ileorectal anastomosis.Fluid throughout nonobstructed bowel and rectum, consistent with enteritis.  Extensive subcutaneous emphysema in the anterior abdominal wall. While   findings may be postoperative given the history of recent surgery, in the appropriate clinical context, necrotizing infection may be considered.   Careful clinical evaluation is therefore warranted.  Pt. admitted for enteritis vs. necrotizing fascitis in setting of recent  robotic surgery at [facility].  Pt. followed by surgery, no interventions indicated at this time.  ID Dr. Monreal following, no antibiotics indicated at this time.  Diet advanced to full liquid.

## 2023-09-05 NOTE — PROGRESS NOTE ADULT - SUBJECTIVE AND OBJECTIVE BOX
Patient is seen and examined at the bed side, is afebrile. She is c/o Right foot pain, due to spur, no redness or swelling noted. The UA form 9/1 is negative , therefore positive urine culture most likely a contaminant, will repeat Urine analysis today.      REVIEW OF SYSTEMS: All other review systems are negative      ALLERGIES: Zosyn ( Swelling of lips and itching)       Vital Signs Last 24 Hrs  T(C): 36.3 (05 Sep 2023 14:39), Max: 37.1 (04 Sep 2023 21:00)  T(F): 97.4 (05 Sep 2023 14:39), Max: 98.8 (04 Sep 2023 21:00)  HR: 78 (05 Sep 2023 14:39) (78 - 101)  BP: 99/64 (05 Sep 2023 14:39) (99/64 - 113/77)  BP(mean): --  RR: 17 (05 Sep 2023 14:39) (17 - 17)  SpO2: 99% (05 Sep 2023 14:39) (97% - 99%)    Parameters below as of 05 Sep 2023 14:39  Patient On (Oxygen Delivery Method): room air      PHYSICAL EXAM:  GENERAL: Not in distress   CHEST/LUNG: Not using accessory muscles  HEART: s1 and s2 present  ABDOMEN:  Nontender and incision site healed  EXTREMITIES: No pedal  edema  CNS: Awake and Alert      LABS:                        11.9   14.31 )-----------( 498      ( 05 Sep 2023 07:15 )             36.8                           11.4   12.80 )-----------( 602      ( 04 Sep 2023 07:24 )             35.8                 09-05    139  |  108  |  27<H>  ----------------------------<  118<H>  3.7   |  21<L>  |  0.87    Ca    8.5      05 Sep 2023 07:15  Phos  3.7     09-05      09-04    141  |  109<H>  |  21<H>  ----------------------------<  116<H>  3.2<L>   |  23  |  0.74    Ca    8.2<L>      04 Sep 2023 07:24    TPro  6.0  /  Alb  2.1<L>  /  TBili  0.4  /  DBili  x   /  AST  8<L>  /  ALT  23  /  AlkPhos  103  08-31      Urinalysis Basic - ( 01 Sep 2023 08:24 )  Color: x / Appearance: x / SG: x / pH: x  Gluc: 102 mg/dL / Ketone: x  / Bili: x / Urobili: x   Blood: x / Protein: x / Nitrite: x   Leuk Esterase: x / RBC: x / WBC x   Sq Epi: x / Non Sq Epi: x / Bacteria: x        MEDICATIONS  (STANDING):    apixaban 5 milliGRAM(s) Oral every 12 hours  aspirin  chewable 81 milliGRAM(s) Oral daily  atorvastatin 20 milliGRAM(s) Oral at bedtime  gabapentin 100 milliGRAM(s) Oral every 8 hours  levETIRAcetam 750 milliGRAM(s) Oral two times a day  levothyroxine 88 MICROGram(s) Oral daily  melatonin 5 milliGRAM(s) Oral at bedtime  oxybutynin 10 milliGRAM(s) Oral daily  pantoprazole    Tablet 40 milliGRAM(s) Oral before breakfast      RADIOLOGY & ADDITIONAL TESTS:    9/2/23 : Xray Abdomen 1 View (09.02.23 @ 10:24) Markedly distended small bowel loops throughout the abdomen   and pelvis is similar to recent CT. The CT demonstrates a status post colectomy and ileorectal anastomosis. Evaluation for free air limited on   this supine projection Subcutaneous emphysema noted laterally corresponding to recent CT abnormality.      9/1/23 : CT Abdomen and Pelvis w/ Oral Cont (09.01.23 @ 15:20) Status post colectomy with ileorectal anastomosis.    Fluid throughout non- obstructed bowel and rectum, consistent with enteritis.    Extensive subcutaneous emphysema in the anterior abdominal wall. While findings may be postoperative given the history of recent surgery, in the   appropriate clinical context, necrotizing infection may be considered. Careful clinical evaluation is therefore warranted.    These findings were discussed with Dr. MARQUITA MORFIN [phone] at  9/1/2023 3:43 PM with readback.      8/31/23 : Xray Chest 1 View- PORTABLE-Urgent (Xray Chest 1 View- PORTABLE-Urgent .) (08.31.23 @ 23:17) No acute pulmonary disease.        MICROBIOLOGY DATA:    Clostridium difficile Toxin by PCR (09.02.23 @ 05:16)   C Diff by PCR Result: NotDetec:     Culture - Stool (09.01.23 @ 03:30)   Specimen Source: .Stool Feces  Culture Results:   No enteric pathogens to date: Final culture pending    GI PCR Panel Stool (09.01.23 @ 03:30)   GI PCR Panel: NotDetec:     Urine Microscopic-Add On (NC) (09.01.23 @ 03:10)   Red Blood Cell - Urine: 0 /HPF  White Blood Cell - Urine: 4 /HPF  Bacteria: Many /HPF  Comment - Urine: few amorphous urates  Squamous Epithelial Cells: Present    Rapid HIV-1/2 Antibody (08.31.23 @ 21:30)   Rapid HIV-1/2 Antibody: Nonreact:                                 Patient is seen and examined at the bed side, is afebrile. She is c/o Right foot pain, due to spur, no redness or swelling noted. The UA form 9/1 is negative , therefore positive urine culture most likely a contaminant, will repeat Urine analysis today.      REVIEW OF SYSTEMS: All other review systems are negative      ALLERGIES: Zosyn ( Swelling of lips and itching)       Vital Signs Last 24 Hrs  T(C): 36.3 (05 Sep 2023 14:39), Max: 37.1 (04 Sep 2023 21:00)  T(F): 97.4 (05 Sep 2023 14:39), Max: 98.8 (04 Sep 2023 21:00)  HR: 78 (05 Sep 2023 14:39) (78 - 101)  BP: 99/64 (05 Sep 2023 14:39) (99/64 - 113/77)  BP(mean): --  RR: 17 (05 Sep 2023 14:39) (17 - 17)  SpO2: 99% (05 Sep 2023 14:39) (97% - 99%)    Parameters below as of 05 Sep 2023 14:39  Patient On (Oxygen Delivery Method): room air      PHYSICAL EXAM:  GENERAL: Not in distress   CHEST/LUNG: Not using accessory muscles  HEART: s1 and s2 present  ABDOMEN:  Nontender and incision site healed  EXTREMITIES: No pedal  edema  CNS: Awake and Alert      LABS:                        11.9   14.31 )-----------( 498      ( 05 Sep 2023 07:15 )             36.8                           11.4   12.80 )-----------( 602      ( 04 Sep 2023 07:24 )             35.8                 09-05    139  |  108  |  27<H>  ----------------------------<  118<H>  3.7   |  21<L>  |  0.87    Ca    8.5      05 Sep 2023 07:15  Phos  3.7     09-05      09-04    141  |  109<H>  |  21<H>  ----------------------------<  116<H>  3.2<L>   |  23  |  0.74    Ca    8.2<L>      04 Sep 2023 07:24    TPro  6.0  /  Alb  2.1<L>  /  TBili  0.4  /  DBili  x   /  AST  8<L>  /  ALT  23  /  AlkPhos  103  08-31      Urinalysis Basic - ( 01 Sep 2023 08:24 )  Color: x / Appearance: x / SG: x / pH: x  Gluc: 102 mg/dL / Ketone: x  / Bili: x / Urobili: x   Blood: x / Protein: x / Nitrite: x   Leuk Esterase: x / RBC: x / WBC x   Sq Epi: x / Non Sq Epi: x / Bacteria: x        MEDICATIONS  (STANDING):    apixaban 5 milliGRAM(s) Oral every 12 hours  aspirin  chewable 81 milliGRAM(s) Oral daily  atorvastatin 20 milliGRAM(s) Oral at bedtime  gabapentin 100 milliGRAM(s) Oral every 8 hours  levETIRAcetam 750 milliGRAM(s) Oral two times a day  levothyroxine 88 MICROGram(s) Oral daily  melatonin 5 milliGRAM(s) Oral at bedtime  oxybutynin 10 milliGRAM(s) Oral daily  pantoprazole    Tablet 40 milliGRAM(s) Oral before breakfast      RADIOLOGY & ADDITIONAL TESTS:    9/2/23 : Xray Abdomen 1 View (09.02.23 @ 10:24) Markedly distended small bowel loops throughout the abdomen   and pelvis is similar to recent CT. The CT demonstrates a status post colectomy and ileorectal anastomosis. Evaluation for free air limited on   this supine projection Subcutaneous emphysema noted laterally corresponding to recent CT abnormality.      9/1/23 : CT Abdomen and Pelvis w/ Oral Cont (09.01.23 @ 15:20) Status post colectomy with ileorectal anastomosis.    Fluid throughout non- obstructed bowel and rectum, consistent with enteritis.    Extensive subcutaneous emphysema in the anterior abdominal wall. While findings may be postoperative given the history of recent surgery, in the   appropriate clinical context, necrotizing infection may be considered. Careful clinical evaluation is therefore warranted.    These findings were discussed with Dr. MARQUITA MORFIN [phone] at  9/1/2023 3:43 PM with readback.      8/31/23 : Xray Chest 1 View- PORTABLE-Urgent (Xray Chest 1 View- PORTABLE-Urgent .) (08.31.23 @ 23:17) No acute pulmonary disease.        MICROBIOLOGY DATA:    Clostridium difficile Toxin by PCR (09.02.23 @ 05:16)   C Diff by PCR Result: NotDetec:     Culture - Stool (09.01.23 @ 03:30)   Specimen Source: .Stool Feces  Culture Results:   No enteric pathogens to date: Final culture pending    GI PCR Panel Stool (09.01.23 @ 03:30)   GI PCR Panel: NotDetec:     Urine Microscopic-Add On (NC) (09.01.23 @ 03:10)   Red Blood Cell - Urine: 0 /HPF  White Blood Cell - Urine: 4 /HPF  Bacteria: Many /HPF  Comment - Urine: few amorphous urates  Squamous Epithelial Cells: Present    Rapid HIV-1/2 Antibody (08.31.23 @ 21:30)   Rapid HIV-1/2 Antibody: Nonreact:                                 Patient is seen and examined at the bed side, is afebrile. She is c/o Right foot pain, due to spur, no redness or swelling noted. The UA form 9/1 is negative , therefore positive urine culture most likely a contaminant, will repeat Urine analysis today.      REVIEW OF SYSTEMS: All other review systems are negative      ALLERGIES: Zosyn ( Swelling of lips and itching)       Vital Signs Last 24 Hrs  T(C): 36.3 (05 Sep 2023 14:39), Max: 37.1 (04 Sep 2023 21:00)  T(F): 97.4 (05 Sep 2023 14:39), Max: 98.8 (04 Sep 2023 21:00)  HR: 78 (05 Sep 2023 14:39) (78 - 101)  BP: 99/64 (05 Sep 2023 14:39) (99/64 - 113/77)  BP(mean): --  RR: 17 (05 Sep 2023 14:39) (17 - 17)  SpO2: 99% (05 Sep 2023 14:39) (97% - 99%)    Parameters below as of 05 Sep 2023 14:39  Patient On (Oxygen Delivery Method): room air      PHYSICAL EXAM:  GENERAL: Not in distress   CHEST/LUNG: Not using accessory muscles  HEART: s1 and s2 present  ABDOMEN:  Nontender and incision site healed  EXTREMITIES: No pedal  edema  CNS: Awake and Alert      LABS:                        11.9   14.31 )-----------( 498      ( 05 Sep 2023 07:15 )             36.8                           11.4   12.80 )-----------( 602      ( 04 Sep 2023 07:24 )             35.8                 09-05    139  |  108  |  27<H>  ----------------------------<  118<H>  3.7   |  21<L>  |  0.87    Ca    8.5      05 Sep 2023 07:15  Phos  3.7     09-05      09-04    141  |  109<H>  |  21<H>  ----------------------------<  116<H>  3.2<L>   |  23  |  0.74    Ca    8.2<L>      04 Sep 2023 07:24    TPro  6.0  /  Alb  2.1<L>  /  TBili  0.4  /  DBili  x   /  AST  8<L>  /  ALT  23  /  AlkPhos  103  08-31      Urinalysis Basic - ( 01 Sep 2023 08:24 )  Color: x / Appearance: x / SG: x / pH: x  Gluc: 102 mg/dL / Ketone: x  / Bili: x / Urobili: x   Blood: x / Protein: x / Nitrite: x   Leuk Esterase: x / RBC: x / WBC x   Sq Epi: x / Non Sq Epi: x / Bacteria: x        MEDICATIONS  (STANDING):    apixaban 5 milliGRAM(s) Oral every 12 hours  aspirin  chewable 81 milliGRAM(s) Oral daily  atorvastatin 20 milliGRAM(s) Oral at bedtime  gabapentin 100 milliGRAM(s) Oral every 8 hours  levETIRAcetam 750 milliGRAM(s) Oral two times a day  levothyroxine 88 MICROGram(s) Oral daily  melatonin 5 milliGRAM(s) Oral at bedtime  oxybutynin 10 milliGRAM(s) Oral daily  pantoprazole    Tablet 40 milliGRAM(s) Oral before breakfast      RADIOLOGY & ADDITIONAL TESTS:    9/2/23 : Xray Abdomen 1 View (09.02.23 @ 10:24) Markedly distended small bowel loops throughout the abdomen   and pelvis is similar to recent CT. The CT demonstrates a status post colectomy and ileorectal anastomosis. Evaluation for free air limited on   this supine projection Subcutaneous emphysema noted laterally corresponding to recent CT abnormality.      9/1/23 : CT Abdomen and Pelvis w/ Oral Cont (09.01.23 @ 15:20) Status post colectomy with ileorectal anastomosis.    Fluid throughout non- obstructed bowel and rectum, consistent with enteritis.    Extensive subcutaneous emphysema in the anterior abdominal wall. While findings may be postoperative given the history of recent surgery, in the   appropriate clinical context, necrotizing infection may be considered. Careful clinical evaluation is therefore warranted.    These findings were discussed with Dr. MARQUITA MORFIN [phone] at  9/1/2023 3:43 PM with readback.      8/31/23 : Xray Chest 1 View- PORTABLE-Urgent (Xray Chest 1 View- PORTABLE-Urgent .) (08.31.23 @ 23:17) No acute pulmonary disease.        MICROBIOLOGY DATA:    Clostridium difficile Toxin by PCR (09.02.23 @ 05:16)   C Diff by PCR Result: NotDetec:     Culture - Stool (09.01.23 @ 03:30)   Specimen Source: .Stool Feces  Culture Results:   No enteric pathogens to date: Final culture pending    GI PCR Panel Stool (09.01.23 @ 03:30)   GI PCR Panel: NotDetec:     Urine Microscopic-Add On (NC) (09.01.23 @ 03:10)   Red Blood Cell - Urine: 0 /HPF  White Blood Cell - Urine: 4 /HPF  Bacteria: Many /HPF  Comment - Urine: few amorphous urates  Squamous Epithelial Cells: Present    Rapid HIV-1/2 Antibody (08.31.23 @ 21:30)   Rapid HIV-1/2 Antibody: Nonreact:

## 2023-09-05 NOTE — PROGRESS NOTE ADULT - ASSESSMENT
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 Patient reports that since her surgery earlier in August she is not eating much at all and  has had diarrhea. She used to have 5-6 episodes of loose watery bowel movements every day and now have improved to 2-3 bowel movements daily. She has no fever or chills. ON Admission, she found to have no fever but Low Blood pressure, BP of 93/62. The stool studies are in process. The ID consult requested to assist with further evaluation and antibiotic management.     # Hypotension - resolved  # R/O Infectious etiology of diarrhea- C.difficile PCR and GI pathogen PCR is negative   #Enteritis and Necrotizing  fasciitis - CT abd/pelvis shows " Extensive subcutaneous emphysema in the anterior abdominal wall. While findings may be postoperative given the history of recent surgery, in the appropriate clinical context, necrotizing infection may be considered."  - As per surgery " Extensive subcutaneous emphysema in the anterior abdominal wall most likely related to recent surgery, necrotizing fascitis very unlikely."     would recommend:    1. Repeat Urine analysis  2. Advanced diet as tolerated  3. Monitor WBC count, multifactorial   4. OOb to chair    d/w Dr. Briggs, Patient and Covering NPSandra    Attending Attestation:    Spent more than 35 minutes on total encounter, more than 50 % of the visit was spent counseling and/or coordinating care by the Attending physician.     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 Patient reports that since her surgery earlier in August she is not eating much at all and  has had diarrhea. She used to have 5-6 episodes of loose watery bowel movements every day and now have improved to 2-3 bowel movements daily. She has no fever or chills. ON Admission, she found to have no fever but Low Blood pressure, BP of 93/62. The stool studies are in process. The ID consult requested to assist with further evaluation and antibiotic management.     # Hypotension - resolved  # R/O Infectious etiology of diarrhea- C.difficile PCR and GI pathogen PCR is negative   #Enteritis and Necrotizing  fasciitis - CT abd/pelvis shows " Extensive subcutaneous emphysema in the anterior abdominal wall. While findings may be postoperative given the history of recent surgery, in the appropriate clinical context, necrotizing infection may be considered."  - As per surgery " Extensive subcutaneous emphysema in the anterior abdominal wall most likely related to recent surgery, necrotizing fascitis very unlikely."     would recommend:    1. Repeat Urine analysis  2. Advanced diet as tolerated  3. Monitor WBC count, multifactorial   4. OOb to chair    d/w Dr. Briggs, Patient and Covering NPSandra    Attending Attestation:    Spent more than 35 minutes on total encounter, more than 50 % of the visit was spent counseling and/or coordinating care by the Attending physician.     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 Patient reports that since her surgery earlier in August she is not eating much at all and  has had diarrhea. She used to have 5-6 episodes of loose watery bowel movements every day and now have improved to 2-3 bowel movements daily. She has no fever or chills. ON Admission, she found to have no fever but Low Blood pressure, BP of 93/62. The stool studies are in process. The ID consult requested to assist with further evaluation and antibiotic management.     # Hypotension - resolved  # R/O Infectious etiology of diarrhea- C.difficile PCR and GI pathogen PCR is negative   #Enteritis and Necrotizing  fasciitis - CT abd/pelvis shows " Extensive subcutaneous emphysema in the anterior abdominal wall. While findings may be postoperative given the history of recent surgery, in the appropriate clinical context, necrotizing infection may be considered."  - As per surgery " Extensive subcutaneous emphysema in the anterior abdominal wall most likely related to recent surgery, necrotizing fascitis very unlikely."     would recommend:    1. Repeat Urine analysis  2. Advanced diet as tolerated  3. Monitor WBC count, multifactorial   4. OOb to chair    d/w Dr. Briggs, Patient and Covering NPSandra    Attending Attestation:    Spent more than 35 minutes on total encounter, more than 50 % of the visit was spent counseling and/or coordinating care by the Attending physician.

## 2023-09-05 NOTE — PROGRESS NOTE ADULT - SUBJECTIVE AND OBJECTIVE BOX
NP Note discussed with  Primary Attending    Patient is a 78y old  Female who presents with a chief complaint of Diarrhea, hypotension (04 Sep 2023 15:50)      INTERVAL HPI/OVERNIGHT EVENTS: no new complaints    MEDICATIONS  (STANDING):  apixaban 5 milliGRAM(s) Oral every 12 hours  aspirin  chewable 81 milliGRAM(s) Oral daily  atorvastatin 20 milliGRAM(s) Oral at bedtime  gabapentin 100 milliGRAM(s) Oral every 8 hours  ketorolac   Injectable 15 milliGRAM(s) IV Push once  levETIRAcetam 750 milliGRAM(s) Oral two times a day  levothyroxine 88 MICROGram(s) Oral daily  melatonin 5 milliGRAM(s) Oral at bedtime  oxybutynin 10 milliGRAM(s) Oral daily  pantoprazole    Tablet 40 milliGRAM(s) Oral before breakfast    MEDICATIONS  (PRN):  acetaminophen     Tablet .. 650 milliGRAM(s) Oral every 6 hours PRN Temp greater or equal to 38C (100.4F), Mild Pain (1 - 3)  albuterol    90 MICROgram(s) HFA Inhaler 2 Puff(s) Inhalation every 6 hours PRN Shortness of Breath and/or Wheezing  ALPRAZolam 0.25 milliGRAM(s) Oral two times a day PRN Anxiety/insomnia  metoclopramide Injectable 10 milliGRAM(s) IV Push every 8 hours PRN nausea, vomiting  ondansetron Injectable 4 milliGRAM(s) IV Push every 6 hours PRN Nausea and/or Vomiting  simethicone 80 milliGRAM(s) Chew four times a day PRN Gas      __________________________________________________  REVIEW OF SYSTEMS:    CONSTITUTIONAL: No fever,   EYES: no acute visual disturbances  NECK: No pain or stiffness  RESPIRATORY: No cough; No shortness of breath  CARDIOVASCULAR: No chest pain, no palpitations  GASTROINTESTINAL: No pain. No nausea or vomiting; No diarrhea   NEUROLOGICAL: No headache or numbness, no tremors  MUSCULOSKELETAL: No joint pain, no muscle pain  GENITOURINARY: no dysuria, no frequency, no hesitancy  PSYCHIATRY: no depression , no anxiety  ALL OTHER  ROS negative        Vital Signs Last 24 Hrs  T(C): 36.5 (05 Sep 2023 05:15), Max: 37.1 (04 Sep 2023 21:00)  T(F): 97.7 (05 Sep 2023 05:15), Max: 98.8 (04 Sep 2023 21:00)  HR: 92 (05 Sep 2023 05:15) (92 - 101)  BP: 113/77 (05 Sep 2023 05:15) (113/77 - 113/77)  BP(mean): --  RR: 17 (05 Sep 2023 05:15) (17 - 17)  SpO2: 97% (05 Sep 2023 05:15) (97% - 98%)    Parameters below as of 05 Sep 2023 05:15  Patient On (Oxygen Delivery Method): room air        ________________________________________________  PHYSICAL EXAM:  Obese, generalized weakness  GENERAL: NAD  HEENT: Normocephalic;  conjunctivae and sclerae clear; moist mucous membranes;   NECK : supple  CHEST/LUNG: Clear to auscultation bilaterally with good air entry   HEART: S1 S2  regular; no murmurs, gallops or rubs  ABDOMEN: Soft, Nontender, Nondistended; Bowel sounds present  EXTREMITIES: no cyanosis; no edema; no calf tenderness  SKIN: warm and dry; no rash  NERVOUS SYSTEM:  Awake and alert; Oriented  to place, person and time ; no new deficits    _________________________________________________  LABS:                        11.9   14.31 )-----------( 498      ( 05 Sep 2023 07:15 )             36.8     09-05    139  |  108  |  27<H>  ----------------------------<  118<H>  3.7   |  21<L>  |  0.87    Ca    8.5      05 Sep 2023 07:15  Phos  3.7     09-05        Urinalysis Basic - ( 05 Sep 2023 07:15 )    Color: x / Appearance: x / SG: x / pH: x  Gluc: 118 mg/dL / Ketone: x  / Bili: x / Urobili: x   Blood: x / Protein: x / Nitrite: x   Leuk Esterase: x / RBC: x / WBC x   Sq Epi: x / Non Sq Epi: x / Bacteria: x      CAPILLARY BLOOD GLUCOSE    RADIOLOGY & ADDITIONAL TESTS:    < from: Xray Abdomen 1 View PORTABLE -Urgent (Xray Abdomen 1 View PORTABLE -Urgent .) (09.03.23 @ 11:16) >  ACC: [id number] EXAM:  XR ABDOMEN PORTABLE URGENT 1V   ORDERED BY: DEEPAK MCCAULEY     PROCEDURE DATE:  09/03/2023          INTERPRETATION:  HISTORY:  Nausea and vomiting    TECHNIQUE:  Supine views of the abdomen and pelvis were obtained.   Comparison is made to prior study dated 9/2/2023    FINDINGS:  Markedly dilated loops of small bowel are again seen   throughout the abdomen. No air is seen distally in the colon. Calcified   fibroids are noted in the pelvis. Subcutaneous emphysema is noted in the   left abdominal wall.    IMPRESSION:  Markedly dilated loops of small bowel are again identified.    < end of copied text >    < from: Xray Abdomen 1 View (09.02.23 @ 10:24) >    ACC: [id number] EXAM:  XR ABDOMEN 1 VIEW   ORDERED BY: SANTI SANTOS     PROCEDURE DATE:  09/02/2023          INTERPRETATION:  Follow-up.    AP supine abdomen. Comparison to CT of 9/1/2023.    IMPRESSION: Markedly distended small bowel loops throughout the abdomen   and pelvis is similar to recent CT. The CT demonstrates a status post   colectomy and ileorectal anastomosis. Evaluation for free air limited on   this supine projection Subcutaneous emphysema noted laterally   corresponding to recent CT abnormality.    < end of copied text >      < from: CT Abdomen and Pelvis w/ Oral Cont (09.01.23 @ 15:20) >    ACC: [id number] EXAM:  CT ABDOMEN AND PELVIS OC   ORDERED BY: MARQUITA MORFIN     PROCEDURE DATE:  09/01/2023          INTERPRETATION:  CLINICAL INFORMATION: Diarrhea. Status post colectomy in   early August.    COMPARISON: None.    CONTRAST/COMPLICATIONS:  IV Contrast: NONE  Oral Contrast: Omnipaque 350  Complications: None reported at time of study completion    PROCEDURE:  CT of the Abdomen and Pelvis was performed.  Sagittal and coronal reformats were performed.    FINDINGS:  LOWER CHEST: Minor atelectasis. Partially visualized left adrenal    LIVER: Fatty infiltration of liver.  BILE DUCTS: Normal caliber.  GALLBLADDER: Cholecystectomy.  SPLEEN: Within normal limits.  PANCREAS: Fatty atrophy.  ADRENALS: Within normal limits.  KIDNEYS/URETERS: 2 cm left upper pole renal cyst. Left upper pole renal   angiomyolipoma measures 1.3 cm.    BLADDER: Within normal limits.  REPRODUCTIVE ORGANS: Enlarged myomatous uterus.    BOWEL: No bowel obstruction. Status post colectomy with ileorectal   anastomosis. Fluid distends the small bowel and rectum.  PERITONEUM: No ascites.  VESSELS: Within normal limits.  RETROPERITONEUM/LYMPH NODES: No lymphadenopathy.  ABDOMINAL WALL: Postoperative changes. Subcutaneous emphysema in the   anterior abdominal wall extending from the pelvis to the lower chest.   Small fluid collection in the right lower abdominal wall measuring 3.5 x   1.9 cm, which may be postoperative.  BONES: Degenerative changes.    IMPRESSION:  Status post colectomy with ileorectal anastomosis.    Fluid throughout nonobstructed bowel and rectum, consistent with   enteritis.    Extensive subcutaneous emphysema in the anterior abdominal wall. While   findings may be postoperative given the history of recent surgery, in the   appropriate clinical context, necrotizing infection may be considered.   Careful clinical evaluation is therefore warranted.    < end of copied text >    GI PCR Panel Stool (09.01.23 @ 03:30)    GI PCR Panel: NotDetec: GI Panel PCR evaluates for:  Campylobacter, Plesiomonas shigelloides, Salmonella, Vibrio, Yersinia  enterocolitica, Enteroaggregative Escherichia (EAEC), Enteropathogenic E.  coli (EPEC), Enterotoxigenic E. coli (ETEC), Shiga-like toxin producing  E.coli (STEC), E. coli O157, Shigella/Enteroinvasive E. coli (EIEC),  Adenovirus, Astrovirus, Norovirus, Rotavirus, Sapovirus, Cryptosporidium,  Cyclospora cayetanensis, Entamoeba histolytica, Giardia lamblia.  For culture and susceptibility reports refer to "reflex stool culture".    Culture - Stool (09.01.23 @ 03:30)    Specimen Source: .Stool Feces   Culture Results:   No enteric pathogens isolated.  (Stool culture examined for Salmonella,  Shigella, Campylobacter, Aeromonas, Plesiomonas,  Vibrio, E.coli O157 and Yersinia)      Urinalysis (09.01.23 @ 03:10)    Glucose Qualitative, Urine: Negative mg/dL   Blood, Urine: Negative   pH Urine: 5.0   Color: Yellow   Urine Appearance: Cloudy   Bilirubin: Negative   Ketone - Urine: 15 mg/dL   Specific Gravity: 1.021   Protein, Urine: Trace mg/dL   Urobilinogen: 0.2 mg/dL   Nitrite: Negative   Leukocyte Esterase Concentration: Trace    Culture - Urine (09.01.23 @ 03:10)    -  Amikacin: S <=16   -  Amoxicillin/Clavulanic Acid: R >16/8   -  Ampicillin: R >16 These ampicillin results predict results for amoxicillin   -  Ampicillin/Sulbactam: R 8/4   -  Aztreonam: S <=4   -  Cefazolin: R >16   -  Cefepime: S <=2   -  Cefoxitin: R >16   -  Ceftriaxone: S <=1   -  Ciprofloxacin: S <=0.25   -  Ertapenem: S <=0.5   -  Gentamicin: S <=2   -  Imipenem: S <=1   -  Levofloxacin: S <=0.5   -  Meropenem: S <=1   -  Nitrofurantoin: I 64 Should not be used to treat pyelonephritis   -  Piperacillin/Tazobactam: S <=8   -  Tobramycin: S <=2   -  Trimethoprim/Sulfamethoxazole: S <=0.5/9.5   Specimen Source: Clean Catch Clean Catch (Midstream)   Culture Results:   >100,000 CFU/ml Enterobacter cloacae complex  >100,000 CFU/ml Aerococcus species  "Aerococcus spp. are predictably susceptible to penicillin,  ampicillin, tetracycline, and vancomycin.  All isolates are  resistant to sulfonamides"   Organism Identification: Enterobacter cloacae complex   Organism: Enterobacter cloacae complex   Method Type: GIAN    Culture - Blood (08.31.23 @ 21:30)    Specimen Source: .Blood Blood-Peripheral   Culture Results:   No growth at 4 days    Culture - Blood (08.31.23 @ 21:20)    Specimen Source: .Blood Blood-Peripheral   Culture Results:   No growth at 4 days        Imaging Personally Reviewed:  YES/NO    Consultant(s) Notes Reviewed:   YES/ No    Care Discussed with Consultants :     Plan of care was discussed with patient and /or primary care giver; all questions and concerns were addressed and care was aligned with patient's wishes.     NP Note discussed with  Primary Attending    Patient is a 78y old  Female who presents with a chief complaint of Diarrhea, hypotension (04 Sep 2023 15:50)      INTERVAL HPI/OVERNIGHT EVENTS: no new complaints    MEDICATIONS  (STANDING):  apixaban 5 milliGRAM(s) Oral every 12 hours  aspirin  chewable 81 milliGRAM(s) Oral daily  atorvastatin 20 milliGRAM(s) Oral at bedtime  gabapentin 100 milliGRAM(s) Oral every 8 hours  ketorolac   Injectable 15 milliGRAM(s) IV Push once  levETIRAcetam 750 milliGRAM(s) Oral two times a day  levothyroxine 88 MICROGram(s) Oral daily  melatonin 5 milliGRAM(s) Oral at bedtime  oxybutynin 10 milliGRAM(s) Oral daily  pantoprazole    Tablet 40 milliGRAM(s) Oral before breakfast    MEDICATIONS  (PRN):  acetaminophen     Tablet .. 650 milliGRAM(s) Oral every 6 hours PRN Temp greater or equal to 38C (100.4F), Mild Pain (1 - 3)  albuterol    90 MICROgram(s) HFA Inhaler 2 Puff(s) Inhalation every 6 hours PRN Shortness of Breath and/or Wheezing  ALPRAZolam 0.25 milliGRAM(s) Oral two times a day PRN Anxiety/insomnia  metoclopramide Injectable 10 milliGRAM(s) IV Push every 8 hours PRN nausea, vomiting  ondansetron Injectable 4 milliGRAM(s) IV Push every 6 hours PRN Nausea and/or Vomiting  simethicone 80 milliGRAM(s) Chew four times a day PRN Gas      __________________________________________________  REVIEW OF SYSTEMS:    CONSTITUTIONAL: No fever,   EYES: no acute visual disturbances  NECK: No pain or stiffness  RESPIRATORY: No cough; No shortness of breath  CARDIOVASCULAR: No chest pain, no palpitations  GASTROINTESTINAL: No pain. No nausea or vomiting; No diarrhea   NEUROLOGICAL: No headache or numbness, no tremors  MUSCULOSKELETAL: No joint pain, no muscle pain  GENITOURINARY: no dysuria, no frequency, no hesitancy  PSYCHIATRY: no depression , no anxiety  ALL OTHER  ROS negative        Vital Signs Last 24 Hrs  T(C): 36.5 (05 Sep 2023 05:15), Max: 37.1 (04 Sep 2023 21:00)  T(F): 97.7 (05 Sep 2023 05:15), Max: 98.8 (04 Sep 2023 21:00)  HR: 92 (05 Sep 2023 05:15) (92 - 101)  BP: 113/77 (05 Sep 2023 05:15) (113/77 - 113/77)  BP(mean): --  RR: 17 (05 Sep 2023 05:15) (17 - 17)  SpO2: 97% (05 Sep 2023 05:15) (97% - 98%)    Parameters below as of 05 Sep 2023 05:15  Patient On (Oxygen Delivery Method): room air        ________________________________________________  PHYSICAL EXAM:  Obese, generalized weakness  GENERAL: NAD  HEENT: Normocephalic;  conjunctivae and sclerae clear; moist mucous membranes;   NECK : supple  CHEST/LUNG: Clear to auscultation bilaterally with good air entry   HEART: S1 S2  regular; no murmurs, gallops or rubs  ABDOMEN: Soft, Nontender, Nondistended; Bowel sounds present  EXTREMITIES: no cyanosis; no edema; no calf tenderness  SKIN: warm and dry; no rash  NERVOUS SYSTEM:  Awake and alert; Oriented  to place, person and time ; no new deficits    _________________________________________________  LABS:                        11.9   14.31 )-----------( 498      ( 05 Sep 2023 07:15 )             36.8     09-05    139  |  108  |  27<H>  ----------------------------<  118<H>  3.7   |  21<L>  |  0.87    Ca    8.5      05 Sep 2023 07:15  Phos  3.7     09-05        Urinalysis Basic - ( 05 Sep 2023 07:15 )    Color: x / Appearance: x / SG: x / pH: x  Gluc: 118 mg/dL / Ketone: x  / Bili: x / Urobili: x   Blood: x / Protein: x / Nitrite: x   Leuk Esterase: x / RBC: x / WBC x   Sq Epi: x / Non Sq Epi: x / Bacteria: x      CAPILLARY BLOOD GLUCOSE    RADIOLOGY & ADDITIONAL TESTS:    < from: Xray Abdomen 1 View PORTABLE -Urgent (Xray Abdomen 1 View PORTABLE -Urgent .) (09.03.23 @ 11:16) >  ACC: [id number] EXAM:  XR ABDOMEN PORTABLE URGENT 1V   ORDERED BY: DEEPAK MCCAULEY     PROCEDURE DATE:  09/03/2023          INTERPRETATION:  HISTORY:  Nausea and vomiting    TECHNIQUE:  Supine views of the abdomen and pelvis were obtained.   Comparison is made to prior study dated 9/2/2023    FINDINGS:  Markedly dilated loops of small bowel are again seen   throughout the abdomen. No air is seen distally in the colon. Calcified   fibroids are noted in the pelvis. Subcutaneous emphysema is noted in the   left abdominal wall.    IMPRESSION:  Markedly dilated loops of small bowel are again identified.    < end of copied text >    < from: Xray Abdomen 1 View (09.02.23 @ 10:24) >    ACC: [id number] EXAM:  XR ABDOMEN 1 VIEW   ORDERED BY: SANTI SANTOS     PROCEDURE DATE:  09/02/2023          INTERPRETATION:  Follow-up.    AP supine abdomen. Comparison to CT of 9/1/2023.    IMPRESSION: Markedly distended small bowel loops throughout the abdomen   and pelvis is similar to recent CT. The CT demonstrates a status post   colectomy and ileorectal anastomosis. Evaluation for free air limited on   this supine projection Subcutaneous emphysema noted laterally   corresponding to recent CT abnormality.    < end of copied text >      < from: CT Abdomen and Pelvis w/ Oral Cont (09.01.23 @ 15:20) >    ACC: [id number] EXAM:  CT ABDOMEN AND PELVIS OC   ORDERED BY: MARQUITA MORFIN     PROCEDURE DATE:  09/01/2023          INTERPRETATION:  CLINICAL INFORMATION: Diarrhea. Status post colectomy in   early August.    COMPARISON: None.    CONTRAST/COMPLICATIONS:  IV Contrast: NONE  Oral Contrast: Omnipaque 350  Complications: None reported at time of study completion    PROCEDURE:  CT of the Abdomen and Pelvis was performed.  Sagittal and coronal reformats were performed.    FINDINGS:  LOWER CHEST: Minor atelectasis. Partially visualized left adrenal    LIVER: Fatty infiltration of liver.  BILE DUCTS: Normal caliber.  GALLBLADDER: Cholecystectomy.  SPLEEN: Within normal limits.  PANCREAS: Fatty atrophy.  ADRENALS: Within normal limits.  KIDNEYS/URETERS: 2 cm left upper pole renal cyst. Left upper pole renal   angiomyolipoma measures 1.3 cm.    BLADDER: Within normal limits.  REPRODUCTIVE ORGANS: Enlarged myomatous uterus.    BOWEL: No bowel obstruction. Status post colectomy with ileorectal   anastomosis. Fluid distends the small bowel and rectum.  PERITONEUM: No ascites.  VESSELS: Within normal limits.  RETROPERITONEUM/LYMPH NODES: No lymphadenopathy.  ABDOMINAL WALL: Postoperative changes. Subcutaneous emphysema in the   anterior abdominal wall extending from the pelvis to the lower chest.   Small fluid collection in the right lower abdominal wall measuring 3.5 x   1.9 cm, which may be postoperative.  BONES: Degenerative changes.    IMPRESSION:  Status post colectomy with ileorectal anastomosis.    Fluid throughout nonobstructed bowel and rectum, consistent with   enteritis.    Extensive subcutaneous emphysema in the anterior abdominal wall. While   findings may be postoperative given the history of recent surgery, in the   appropriate clinical context, necrotizing infection may be considered.   Careful clinical evaluation is therefore warranted.    < end of copied text >    GI PCR Panel Stool (09.01.23 @ 03:30)    GI PCR Panel: NotDetec: GI Panel PCR evaluates for:  Campylobacter, Plesiomonas shigelloides, Salmonella, Vibrio, Yersinia  enterocolitica, Enteroaggregative Escherichia (EAEC), Enteropathogenic E.  coli (EPEC), Enterotoxigenic E. coli (ETEC), Shiga-like toxin producing  E.coli (STEC), E. coli O157, Shigella/Enteroinvasive E. coli (EIEC),  Adenovirus, Astrovirus, Norovirus, Rotavirus, Sapovirus, Cryptosporidium,  Cyclospora cayetanensis, Entamoeba histolytica, Giardia lamblia.  For culture and susceptibility reports refer to "reflex stool culture".    Culture - Stool (09.01.23 @ 03:30)    Specimen Source: .Stool Feces   Culture Results:   No enteric pathogens isolated.  (Stool culture examined for Salmonella,  Shigella, Campylobacter, Aeromonas, Plesiomonas,  Vibrio, E.coli O157 and Yersinia)      Urinalysis (09.01.23 @ 03:10)    Glucose Qualitative, Urine: Negative mg/dL   Blood, Urine: Negative   pH Urine: 5.0   Color: Yellow   Urine Appearance: Cloudy   Bilirubin: Negative   Ketone - Urine: 15 mg/dL   Specific Gravity: 1.021   Protein, Urine: Trace mg/dL   Urobilinogen: 0.2 mg/dL   Nitrite: Negative   Leukocyte Esterase Concentration: Trace    Culture - Urine (09.01.23 @ 03:10)    -  Amikacin: S <=16   -  Amoxicillin/Clavulanic Acid: R >16/8   -  Ampicillin: R >16 These ampicillin results predict results for amoxicillin   -  Ampicillin/Sulbactam: R 8/4   -  Aztreonam: S <=4   -  Cefazolin: R >16   -  Cefepime: S <=2   -  Cefoxitin: R >16   -  Ceftriaxone: S <=1   -  Ciprofloxacin: S <=0.25   -  Ertapenem: S <=0.5   -  Gentamicin: S <=2   -  Imipenem: S <=1   -  Levofloxacin: S <=0.5   -  Meropenem: S <=1   -  Nitrofurantoin: I 64 Should not be used to treat pyelonephritis   -  Piperacillin/Tazobactam: S <=8   -  Tobramycin: S <=2   -  Trimethoprim/Sulfamethoxazole: S <=0.5/9.5   Specimen Source: Clean Catch Clean Catch (Midstream)   Culture Results:   >100,000 CFU/ml Enterobacter cloacae complex  >100,000 CFU/ml Aerococcus species  "Aerococcus spp. are predictably susceptible to penicillin,  ampicillin, tetracycline, and vancomycin.  All isolates are  resistant to sulfonamides"   Organism Identification: Enterobacter cloacae complex   Organism: Enterobacter cloacae complex   Method Type: GIAN    Culture - Blood (08.31.23 @ 21:30)    Specimen Source: .Blood Blood-Peripheral   Culture Results:   No growth at 4 days    Culture - Blood (08.31.23 @ 21:20)    Specimen Source: .Blood Blood-Peripheral   Culture Results:   No growth at 4 days        Imaging Personally Reviewed:  YES/NO    Consultant(s) Notes Reviewed:   YES/ No    Care Discussed with Consultants :     Plan of care was discussed with patient and /or primary care giver; all questions and concerns were addressed and care was aligned with patient's wishes.     NP Note discussed with  Primary Attending    Patient is a 78y old  Female who presents with a chief complaint of Diarrhea, hypotension (04 Sep 2023 15:50)      INTERVAL HPI/OVERNIGHT EVENTS: no new complaints    MEDICATIONS  (STANDING):  apixaban 5 milliGRAM(s) Oral every 12 hours  aspirin  chewable 81 milliGRAM(s) Oral daily  atorvastatin 20 milliGRAM(s) Oral at bedtime  gabapentin 100 milliGRAM(s) Oral every 8 hours  ketorolac   Injectable 15 milliGRAM(s) IV Push once  levETIRAcetam 750 milliGRAM(s) Oral two times a day  levothyroxine 88 MICROGram(s) Oral daily  melatonin 5 milliGRAM(s) Oral at bedtime  oxybutynin 10 milliGRAM(s) Oral daily  pantoprazole    Tablet 40 milliGRAM(s) Oral before breakfast    MEDICATIONS  (PRN):  acetaminophen     Tablet .. 650 milliGRAM(s) Oral every 6 hours PRN Temp greater or equal to 38C (100.4F), Mild Pain (1 - 3)  albuterol    90 MICROgram(s) HFA Inhaler 2 Puff(s) Inhalation every 6 hours PRN Shortness of Breath and/or Wheezing  ALPRAZolam 0.25 milliGRAM(s) Oral two times a day PRN Anxiety/insomnia  metoclopramide Injectable 10 milliGRAM(s) IV Push every 8 hours PRN nausea, vomiting  ondansetron Injectable 4 milliGRAM(s) IV Push every 6 hours PRN Nausea and/or Vomiting  simethicone 80 milliGRAM(s) Chew four times a day PRN Gas      __________________________________________________  REVIEW OF SYSTEMS:    CONSTITUTIONAL: No fever,   EYES: no acute visual disturbances  NECK: No pain or stiffness  RESPIRATORY: No cough; No shortness of breath  CARDIOVASCULAR: No chest pain, no palpitations  GASTROINTESTINAL: No pain. No nausea or vomiting; No diarrhea   NEUROLOGICAL: No headache or numbness, no tremors  MUSCULOSKELETAL: No joint pain, no muscle pain  GENITOURINARY: no dysuria, no frequency, no hesitancy  PSYCHIATRY: no depression , no anxiety  ALL OTHER  ROS negative        Vital Signs Last 24 Hrs  T(C): 36.5 (05 Sep 2023 05:15), Max: 37.1 (04 Sep 2023 21:00)  T(F): 97.7 (05 Sep 2023 05:15), Max: 98.8 (04 Sep 2023 21:00)  HR: 92 (05 Sep 2023 05:15) (92 - 101)  BP: 113/77 (05 Sep 2023 05:15) (113/77 - 113/77)  BP(mean): --  RR: 17 (05 Sep 2023 05:15) (17 - 17)  SpO2: 97% (05 Sep 2023 05:15) (97% - 98%)    Parameters below as of 05 Sep 2023 05:15  Patient On (Oxygen Delivery Method): room air        ________________________________________________  PHYSICAL EXAM:  Obese, generalized weakness  GENERAL: NAD  HEENT: Normocephalic;  conjunctivae and sclerae clear; moist mucous membranes;   NECK : supple  CHEST/LUNG: Clear to auscultation bilaterally with good air entry   HEART: S1 S2  regular; no murmurs, gallops or rubs  ABDOMEN: Soft, Nontender, Nondistended; Bowel sounds present  EXTREMITIES: no cyanosis; no edema; no calf tenderness  SKIN: warm and dry; no rash  NERVOUS SYSTEM:  Awake and alert; Oriented  to place, person and time ; no new deficits    _________________________________________________  LABS:                        11.9   14.31 )-----------( 498      ( 05 Sep 2023 07:15 )             36.8     09-05    139  |  108  |  27<H>  ----------------------------<  118<H>  3.7   |  21<L>  |  0.87    Ca    8.5      05 Sep 2023 07:15  Phos  3.7     09-05        Urinalysis Basic - ( 05 Sep 2023 07:15 )    Color: x / Appearance: x / SG: x / pH: x  Gluc: 118 mg/dL / Ketone: x  / Bili: x / Urobili: x   Blood: x / Protein: x / Nitrite: x   Leuk Esterase: x / RBC: x / WBC x   Sq Epi: x / Non Sq Epi: x / Bacteria: x      CAPILLARY BLOOD GLUCOSE    RADIOLOGY & ADDITIONAL TESTS:    < from: Xray Abdomen 1 View PORTABLE -Urgent (Xray Abdomen 1 View PORTABLE -Urgent .) (09.03.23 @ 11:16) >  ACC: [id number] EXAM:  XR ABDOMEN PORTABLE URGENT 1V   ORDERED BY: DEEPAK MCCAULEY     PROCEDURE DATE:  09/03/2023          INTERPRETATION:  HISTORY:  Nausea and vomiting    TECHNIQUE:  Supine views of the abdomen and pelvis were obtained.   Comparison is made to prior study dated 9/2/2023    FINDINGS:  Markedly dilated loops of small bowel are again seen   throughout the abdomen. No air is seen distally in the colon. Calcified   fibroids are noted in the pelvis. Subcutaneous emphysema is noted in the   left abdominal wall.    IMPRESSION:  Markedly dilated loops of small bowel are again identified.    < end of copied text >    < from: Xray Abdomen 1 View (09.02.23 @ 10:24) >    ACC: [id number] EXAM:  XR ABDOMEN 1 VIEW   ORDERED BY: SANTI SANTOS     PROCEDURE DATE:  09/02/2023          INTERPRETATION:  Follow-up.    AP supine abdomen. Comparison to CT of 9/1/2023.    IMPRESSION: Markedly distended small bowel loops throughout the abdomen   and pelvis is similar to recent CT. The CT demonstrates a status post   colectomy and ileorectal anastomosis. Evaluation for free air limited on   this supine projection Subcutaneous emphysema noted laterally   corresponding to recent CT abnormality.    < end of copied text >      < from: CT Abdomen and Pelvis w/ Oral Cont (09.01.23 @ 15:20) >    ACC: [id number] EXAM:  CT ABDOMEN AND PELVIS OC   ORDERED BY: MARQUITA MORFIN     PROCEDURE DATE:  09/01/2023          INTERPRETATION:  CLINICAL INFORMATION: Diarrhea. Status post colectomy in   early August.    COMPARISON: None.    CONTRAST/COMPLICATIONS:  IV Contrast: NONE  Oral Contrast: Omnipaque 350  Complications: None reported at time of study completion    PROCEDURE:  CT of the Abdomen and Pelvis was performed.  Sagittal and coronal reformats were performed.    FINDINGS:  LOWER CHEST: Minor atelectasis. Partially visualized left adrenal    LIVER: Fatty infiltration of liver.  BILE DUCTS: Normal caliber.  GALLBLADDER: Cholecystectomy.  SPLEEN: Within normal limits.  PANCREAS: Fatty atrophy.  ADRENALS: Within normal limits.  KIDNEYS/URETERS: 2 cm left upper pole renal cyst. Left upper pole renal   angiomyolipoma measures 1.3 cm.    BLADDER: Within normal limits.  REPRODUCTIVE ORGANS: Enlarged myomatous uterus.    BOWEL: No bowel obstruction. Status post colectomy with ileorectal   anastomosis. Fluid distends the small bowel and rectum.  PERITONEUM: No ascites.  VESSELS: Within normal limits.  RETROPERITONEUM/LYMPH NODES: No lymphadenopathy.  ABDOMINAL WALL: Postoperative changes. Subcutaneous emphysema in the   anterior abdominal wall extending from the pelvis to the lower chest.   Small fluid collection in the right lower abdominal wall measuring 3.5 x   1.9 cm, which may be postoperative.  BONES: Degenerative changes.    IMPRESSION:  Status post colectomy with ileorectal anastomosis.    Fluid throughout nonobstructed bowel and rectum, consistent with   enteritis.    Extensive subcutaneous emphysema in the anterior abdominal wall. While   findings may be postoperative given the history of recent surgery, in the   appropriate clinical context, necrotizing infection may be considered.   Careful clinical evaluation is therefore warranted.    < end of copied text >    GI PCR Panel Stool (09.01.23 @ 03:30)    GI PCR Panel: NotDetec: GI Panel PCR evaluates for:  Campylobacter, Plesiomonas shigelloides, Salmonella, Vibrio, Yersinia  enterocolitica, Enteroaggregative Escherichia (EAEC), Enteropathogenic E.  coli (EPEC), Enterotoxigenic E. coli (ETEC), Shiga-like toxin producing  E.coli (STEC), E. coli O157, Shigella/Enteroinvasive E. coli (EIEC),  Adenovirus, Astrovirus, Norovirus, Rotavirus, Sapovirus, Cryptosporidium,  Cyclospora cayetanensis, Entamoeba histolytica, Giardia lamblia.  For culture and susceptibility reports refer to "reflex stool culture".    Culture - Stool (09.01.23 @ 03:30)    Specimen Source: .Stool Feces   Culture Results:   No enteric pathogens isolated.  (Stool culture examined for Salmonella,  Shigella, Campylobacter, Aeromonas, Plesiomonas,  Vibrio, E.coli O157 and Yersinia)      Urinalysis (09.01.23 @ 03:10)    Glucose Qualitative, Urine: Negative mg/dL   Blood, Urine: Negative   pH Urine: 5.0   Color: Yellow   Urine Appearance: Cloudy   Bilirubin: Negative   Ketone - Urine: 15 mg/dL   Specific Gravity: 1.021   Protein, Urine: Trace mg/dL   Urobilinogen: 0.2 mg/dL   Nitrite: Negative   Leukocyte Esterase Concentration: Trace    Culture - Urine (09.01.23 @ 03:10)    -  Amikacin: S <=16   -  Amoxicillin/Clavulanic Acid: R >16/8   -  Ampicillin: R >16 These ampicillin results predict results for amoxicillin   -  Ampicillin/Sulbactam: R 8/4   -  Aztreonam: S <=4   -  Cefazolin: R >16   -  Cefepime: S <=2   -  Cefoxitin: R >16   -  Ceftriaxone: S <=1   -  Ciprofloxacin: S <=0.25   -  Ertapenem: S <=0.5   -  Gentamicin: S <=2   -  Imipenem: S <=1   -  Levofloxacin: S <=0.5   -  Meropenem: S <=1   -  Nitrofurantoin: I 64 Should not be used to treat pyelonephritis   -  Piperacillin/Tazobactam: S <=8   -  Tobramycin: S <=2   -  Trimethoprim/Sulfamethoxazole: S <=0.5/9.5   Specimen Source: Clean Catch Clean Catch (Midstream)   Culture Results:   >100,000 CFU/ml Enterobacter cloacae complex  >100,000 CFU/ml Aerococcus species  "Aerococcus spp. are predictably susceptible to penicillin,  ampicillin, tetracycline, and vancomycin.  All isolates are  resistant to sulfonamides"   Organism Identification: Enterobacter cloacae complex   Organism: Enterobacter cloacae complex   Method Type: GIAN    Culture - Blood (08.31.23 @ 21:30)    Specimen Source: .Blood Blood-Peripheral   Culture Results:   No growth at 4 days    Culture - Blood (08.31.23 @ 21:20)    Specimen Source: .Blood Blood-Peripheral   Culture Results:   No growth at 4 days        Imaging Personally Reviewed:  YES/NO    Consultant(s) Notes Reviewed:   YES/ No    Care Discussed with Consultants :     Plan of care was discussed with patient and /or primary care giver; all questions and concerns were addressed and care was aligned with patient's wishes.     Statement Selected

## 2023-09-05 NOTE — PROGRESS NOTE ADULT - SUBJECTIVE AND OBJECTIVE BOX
Date of Service 09-05-23 @ 11:47    CHIEF COMPLAINT:Patient is a 78y old  Female who presents with a chief complaint of Diarrhea, hypotension.Pt is feeling better.    	  REVIEW OF SYSTEMS:  CONSTITUTIONAL: No fever, weight loss, or fatigue  EYES: No eye pain, visual disturbances, or discharge  ENT:  No difficulty hearing, tinnitus, vertigo; No sinus or throat pain  NECK: No pain or stiffness  RESPIRATORY: No cough, wheezing, chills or hemoptysis; No Shortness of Breath  CARDIOVASCULAR: No chest pain, palpitations, passing out, dizziness, or leg swelling  GASTROINTESTINAL: No abdominal or epigastric pain. No nausea, vomiting, or hematemesis; No diarrhea or constipation. No melena or hematochezia.  GENITOURINARY: No dysuria, frequency, hematuria, or incontinence  NEUROLOGICAL: No headaches, memory loss, loss of strength, numbness, or tremors  SKIN: No itching, burning, rashes, or lesions   LYMPH Nodes: No enlarged glands  ENDOCRINE: No heat or cold intolerance; No hair loss  MUSCULOSKELETAL: No joint pain or swelling; No muscle, back, or extremity pain  PSYCHIATRIC: No depression, anxiety, mood swings, or difficulty sleeping  HEME/LYMPH: No easy bruising, or bleeding gums  ALLERGY AND IMMUNOLOGIC: No hives or eczema	      PHYSICAL EXAM:  T(C): 36.5 (09-05-23 @ 05:15), Max: 37.1 (09-04-23 @ 21:00)  HR: 92 (09-05-23 @ 05:15) (92 - 101)  BP: 113/77 (09-05-23 @ 05:15) (113/77 - 113/77)  RR: 17 (09-05-23 @ 05:15) (17 - 17)  SpO2: 97% (09-05-23 @ 05:15) (97% - 98%)  Wt(kg): --  I&O's Summary      Appearance: Normal	  HEENT:   Normal oral mucosa, PERRL, EOMI	  Lymphatic: No lymphadenopathy  Cardiovascular: Normal S1 S2, No JVD, No murmurs, No edema  Respiratory: Lungs clear to auscultation	  Psychiatry: A & O x 3, Mood & affect appropriate  Gastrointestinal:  Soft, Non-tender, + BS	  Skin: No rashes, No ecchymoses, No cyanosis	  Neurologic: Non-focal  Extremities: Normal range of motion, No clubbing, cyanosis or edema  Vascular: Peripheral pulses palpable 2+ bilaterally    MEDICATIONS  (STANDING):  apixaban 5 milliGRAM(s) Oral every 12 hours  aspirin  chewable 81 milliGRAM(s) Oral daily  atorvastatin 20 milliGRAM(s) Oral at bedtime  gabapentin 100 milliGRAM(s) Oral every 8 hours  levETIRAcetam 750 milliGRAM(s) Oral two times a day  levothyroxine 88 MICROGram(s) Oral daily  melatonin 5 milliGRAM(s) Oral at bedtime  oxybutynin 10 milliGRAM(s) Oral daily  pantoprazole    Tablet 40 milliGRAM(s) Oral before breakfast    	  	  LABS:	 	                      11.9   14.31 )-----------( 498      ( 05 Sep 2023 07:15 )             36.8     09-05    139  |  108  |  27<H>  ----------------------------<  118<H>  3.7   |  21<L>  |  0.87    Ca    8.5      05 Sep 2023 07:15  Phos  3.7     09-05        Lipid Profile: Cholesterol 87  HDL 55  TG 74  Ldl calc 17    TSH: Thyroid Stimulating Hormone, Serum: 0.84 uU/mL (09-02 @ 06:58)

## 2023-09-05 NOTE — PROGRESS NOTE ADULT - PROBLEM SELECTOR PLAN 3
P/w weakness, lethargy, dizziness Likely in setting of chronic diarrhea since colectomy VS sepsis vs poor oral intake.   -SBP at facility in low 80s.   -s/ IVF hydration  -F/u orthostatic vitals.   -Blood cultures negative  -CXR - negative. WBC 9.21-->11.54-->12.8-->14.31  -Afebrile   -Trend WBC WBC 9.21-->11.54-->12.8-->14.31  -Afebrile   -Trend WBC  -Pt. may need to f/u with [facility] surgical team WBC 9.21-->11.54-->12.8-->14.31  -Afebrile   -Trend WBC  -Pt. may need to f/u with [facility] surgical team WBC 9.21-->11.54-->12.8-->14.31  -Afebrile   -Trend WBC  -Pt. may need to f/u with [facility] surgical team

## 2023-09-05 NOTE — PROGRESS NOTE ADULT - ASSESSMENT
79 y/o F with PMH transverse colon neoplasm s/p colectomy in [facility] 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 Patient reports that since yesterday she feels weak and tired,chronic diarrhea,hypotension, now ileus.  1.?ileus-resolving.  2.Chronic diarrhea.  3.Hypothyroid-synthroid.  4.Echocardiogram can be done as outpatient.  5.CAD-asa,b blocker,statin.  6.Afib-eliquis.  7.UTI-ABX d/c as per ID-colonization.  8.GI prophylaxis. 79 y/o F with PMH transverse colon neoplasm s/p colectomy in [facility] 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 Patient reports that since yesterday she feels weak and tired,chronic diarrhea,hypotension, now ileus.  1.?ileus-resolving.  2.Chronic diarrhea.  3.Hypothyroid-synthroid.  4.Echocardiogram can be done as outpatient.  5.CAD-asa,b blocker,statin.  6.Afib-eliquis.  7.UTI-ABX d/c as per ID-colonization.  8.GI prophylaxis. 77 y/o F with PMH transverse colon neoplasm s/p colectomy in [facility] 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 Patient reports that since yesterday she feels weak and tired,chronic diarrhea,hypotension, now ileus.  1.?ileus-resolving.  2.Chronic diarrhea.  3.Hypothyroid-synthroid.  4.Echocardiogram can be done as outpatient.  5.CAD-asa,b blocker,statin.  6.Afib-eliquis.  7.UTI-ABX d/c as per ID-colonization.  8.GI prophylaxis.

## 2023-09-05 NOTE — PROGRESS NOTE ADULT - SUBJECTIVE AND OBJECTIVE BOX
[   ] ICU                                          [   ] CCU                                      [ X  ] Medical Floor      Patient is a 78 year old female with persistent diarrhea. GI consulted to evaluate.         Patient is a 78 year old female with past medical history significant for transverse colon neoplasm s/p colectomy in [facility] in early August 2023, HTN, HLD, CAD s/p LHC with TALI to RCA, Atrial fibrillation s/p watchman implant (3/23), hypothyroidism, PUJA, and TIA presented to the emergency room with 2 days history of lightheaded and feels lethargic. Patient reports when her blood pressure was checked in the facility it was in the low 80s. Patient reports that since her surgery earlier in August she is not eating much at all. She reports that since the surgery she has had diarrhea. Patient reports she used to have 5-6 episodes of loose watery bowel movements every day and now have improved to 2-3 bowel movements daily. Patient reports due to fear of worsening diarrhea she has decreased diet and oral intake. Patient reports she only takes soup and anything heavy makes her diarrhea worse associated with intermittent, diffuse, non radiating, 3/10 intensity, crampy abdominal pain. . Patient denies nausea, vomiting, hematemesis, hematochezia, melena, fever, chills, chest pain, SOB, palpitation, cough, hematuria, dysuria, recent traveling.        Patient is comfortable. No new complaints reported, No abdominal pain, N/V, hematemesis, hematochezia, melena, fever, chills, chest pain, SOB, cough or diarrhea reported.      PAIN MANAGEMENT:  Pain Scale:                 3/10  Pain Location:  Diffuse crampy abdominal pain       PAST MEDICAL HISTORY    Atrial fibrillation    Hyperlipidemia    Hypertension    CAD (coronary artery disease)    Colon cancer    Hypothyroidism    Obstructive sleep apnea    Transient ischemic attack (TIA)             PAST SURGICAL HISTORY     Colectomy        Allergies    No Known Allergies    Intolerances  None       SOCIAL HISTORY  Advanced Directives:       [ X ] Full Code       [  ] DNR  Marital Status:         [  ] M      [ X ] S      [  ] D       [  ] W  Children:       [ X ] Yes      [  ] No  Occupation:        [  ] Employed       [ X ] Unemployed       [  ] Retired  Diet:       [ X ] Regular       [  ] PEG feeding          [  ] NG tube feeding  Drug Use:           [  ] Patient denied          [  ] Yes  Alcohol:           [ X ] No             [  ] Yes (socially)         [  ] Yes (chronic)  Tobacco:           [  ] Yes           [ X ] No      FAMILY HISTORY  [ X ] Heart Disease            [ X ] Diabetes             [ X ] HTN             [  ] Colon Cancer             [  ] Stomach Cancer              [  ] Pancreatic Cancer      VITALS  Vital Signs Last 24 Hrs  T(C): 36.7 (05 Sep 2023 20:26), Max: 36.7 (05 Sep 2023 20:26)  T(F): 98 (05 Sep 2023 20:26), Max: 98 (05 Sep 2023 20:26)  HR: 94 (05 Sep 2023 20:26) (78 - 94)  BP: 112/82 (05 Sep 2023 20:26) (99/64 - 113/77)   RR: 17 (05 Sep 2023 20:26) (17 - 17)  SpO2: 98% (05 Sep 2023 20:26) (97% - 99%)  Parameters below as of 05 Sep 2023 20:26  Patient On (Oxygen Delivery Method): room air       MEDICATIONS  (STANDING):  apixaban 5 milliGRAM(s) Oral every 12 hours  aspirin  chewable 81 milliGRAM(s) Oral daily  atorvastatin 20 milliGRAM(s) Oral at bedtime  gabapentin 100 milliGRAM(s) Oral every 8 hours  levETIRAcetam 750 milliGRAM(s) Oral two times a day  levothyroxine 88 MICROGram(s) Oral daily  melatonin 5 milliGRAM(s) Oral at bedtime  oxybutynin 10 milliGRAM(s) Oral daily  pantoprazole    Tablet 40 milliGRAM(s) Oral before breakfast    MEDICATIONS  (PRN):  acetaminophen     Tablet .. 650 milliGRAM(s) Oral every 6 hours PRN Temp greater or equal to 38C (100.4F), Mild Pain (1 - 3)  albuterol    90 MICROgram(s) HFA Inhaler 2 Puff(s) Inhalation every 6 hours PRN Shortness of Breath and/or Wheezing  ALPRAZolam 0.25 milliGRAM(s) Oral two times a day PRN Anxiety/insomnia  metoclopramide Injectable 10 milliGRAM(s) IV Push every 8 hours PRN nausea, vomiting  ondansetron Injectable 4 milliGRAM(s) IV Push every 6 hours PRN Nausea and/or Vomiting  simethicone 80 milliGRAM(s) Chew four times a day PRN Gas                            11.9   14.31 )-----------( 498      ( 05 Sep 2023 07:15 )             36.8       09-05    139  |  108  |  27<H>  ----------------------------<  118<H>  3.7   |  21<L>  |  0.87    Ca    8.5      05 Sep 2023 07:15  Phos  3.7     09-05

## 2023-09-05 NOTE — PROGRESS NOTE ADULT - PROBLEM SELECTOR PLAN 5
C/w eliquis.   Not on b-blocker as per NH paperwork. Colon cancer s/p colectomy.   Not on any chemo drugs as per NH paperwork.

## 2023-09-05 NOTE — PROGRESS NOTE ADULT - ASSESSMENT
1. Diarrhea  2. R/o infectious colitis  3. R/o C. Diff colitis  4. Anemia  5. No evidence of acute GI bleeding  6. H/o colon cancer    Suggestions:    1. Diet as tolerated  2. Antibiotics as per ID  3. ID follow up  4. Monitor electrolytes  5. IVF hydration  6. Monitor H/H  7. Transfuse PRBC as needed  8. Surgical follow up  9. Avoid NSAID  10. Protonix daily  11. DVT prophylaxis

## 2023-09-06 LAB
ANION GAP SERPL CALC-SCNC: 13 MMOL/L — SIGNIFICANT CHANGE UP (ref 5–17)
APPEARANCE UR: ABNORMAL
BACTERIA # UR AUTO: ABNORMAL /HPF
BILIRUB UR-MCNC: ABNORMAL
BUN SERPL-MCNC: 41 MG/DL — HIGH (ref 7–18)
CALCIUM SERPL-MCNC: 8.4 MG/DL — SIGNIFICANT CHANGE UP (ref 8.4–10.5)
CHLORIDE SERPL-SCNC: 105 MMOL/L — SIGNIFICANT CHANGE UP (ref 96–108)
CO2 SERPL-SCNC: 18 MMOL/L — LOW (ref 22–31)
COLOR SPEC: SIGNIFICANT CHANGE UP
CREAT SERPL-MCNC: 1.26 MG/DL — SIGNIFICANT CHANGE UP (ref 0.5–1.3)
CULTURE RESULTS: SIGNIFICANT CHANGE UP
DIFF PNL FLD: ABNORMAL
EGFR: 43 ML/MIN/1.73M2 — LOW
EPI CELLS # UR: SIGNIFICANT CHANGE UP
GLUCOSE SERPL-MCNC: 122 MG/DL — HIGH (ref 70–99)
GLUCOSE UR QL: NEGATIVE MG/DL — SIGNIFICANT CHANGE UP
HCT VFR BLD CALC: 41.4 % — SIGNIFICANT CHANGE UP (ref 34.5–45)
HCT VFR BLD CALC: 42.4 % — SIGNIFICANT CHANGE UP (ref 34.5–45)
HGB BLD-MCNC: 13.5 G/DL — SIGNIFICANT CHANGE UP (ref 11.5–15.5)
HGB BLD-MCNC: 13.7 G/DL — SIGNIFICANT CHANGE UP (ref 11.5–15.5)
KETONES UR-MCNC: 15 MG/DL
LEUKOCYTE ESTERASE UR-ACNC: ABNORMAL
MCHC RBC-ENTMCNC: 27.7 PG — SIGNIFICANT CHANGE UP (ref 27–34)
MCHC RBC-ENTMCNC: 28.1 PG — SIGNIFICANT CHANGE UP (ref 27–34)
MCHC RBC-ENTMCNC: 32.3 GM/DL — SIGNIFICANT CHANGE UP (ref 32–36)
MCHC RBC-ENTMCNC: 32.6 GM/DL — SIGNIFICANT CHANGE UP (ref 32–36)
MCV RBC AUTO: 85 FL — SIGNIFICANT CHANGE UP (ref 80–100)
MCV RBC AUTO: 87.1 FL — SIGNIFICANT CHANGE UP (ref 80–100)
NITRITE UR-MCNC: POSITIVE
NRBC # BLD: 0 /100 WBCS — SIGNIFICANT CHANGE UP (ref 0–0)
PH UR: 5.5 — SIGNIFICANT CHANGE UP (ref 5–8)
PLATELET # BLD AUTO: 592 K/UL — HIGH (ref 150–400)
PLATELET # BLD AUTO: SIGNIFICANT CHANGE UP K/UL (ref 150–400)
POTASSIUM SERPL-MCNC: 4.8 MMOL/L — SIGNIFICANT CHANGE UP (ref 3.5–5.3)
POTASSIUM SERPL-SCNC: 4.8 MMOL/L — SIGNIFICANT CHANGE UP (ref 3.5–5.3)
PROT UR-MCNC: 100 MG/DL
RBC # BLD: 4.87 M/UL — SIGNIFICANT CHANGE UP (ref 3.8–5.2)
RBC # FLD: 15.9 % — HIGH (ref 10.3–14.5)
RBC CASTS # UR COMP ASSIST: 5 /HPF — HIGH (ref 0–4)
SODIUM SERPL-SCNC: 136 MMOL/L — SIGNIFICANT CHANGE UP (ref 135–145)
SP GR SPEC: 1.03 — HIGH (ref 1–1.03)
SPECIMEN SOURCE: SIGNIFICANT CHANGE UP
UROBILINOGEN FLD QL: 1 MG/DL — SIGNIFICANT CHANGE UP (ref 0.2–1)
WBC # BLD: 15.5 K/UL — HIGH (ref 3.8–10.5)
WBC # BLD: 17.87 K/UL — HIGH (ref 3.8–10.5)
WBC # FLD AUTO: 15.5 K/UL — HIGH (ref 3.8–10.5)
WBC # FLD AUTO: 17.87 K/UL — HIGH (ref 3.8–10.5)
WBC UR QL: 20 /HPF — HIGH (ref 0–5)

## 2023-09-06 RX ADMIN — Medication 650 MILLIGRAM(S): at 16:55

## 2023-09-06 RX ADMIN — ATORVASTATIN CALCIUM 20 MILLIGRAM(S): 80 TABLET, FILM COATED ORAL at 21:48

## 2023-09-06 RX ADMIN — GABAPENTIN 100 MILLIGRAM(S): 400 CAPSULE ORAL at 21:47

## 2023-09-06 RX ADMIN — Medication 10 MILLIGRAM(S): at 12:30

## 2023-09-06 RX ADMIN — LEVETIRACETAM 750 MILLIGRAM(S): 250 TABLET, FILM COATED ORAL at 05:51

## 2023-09-06 RX ADMIN — GABAPENTIN 100 MILLIGRAM(S): 400 CAPSULE ORAL at 14:50

## 2023-09-06 RX ADMIN — APIXABAN 5 MILLIGRAM(S): 2.5 TABLET, FILM COATED ORAL at 17:12

## 2023-09-06 RX ADMIN — SIMETHICONE 80 MILLIGRAM(S): 80 TABLET, CHEWABLE ORAL at 10:32

## 2023-09-06 RX ADMIN — ONDANSETRON 4 MILLIGRAM(S): 8 TABLET, FILM COATED ORAL at 19:34

## 2023-09-06 RX ADMIN — LEVETIRACETAM 750 MILLIGRAM(S): 250 TABLET, FILM COATED ORAL at 17:12

## 2023-09-06 RX ADMIN — GABAPENTIN 100 MILLIGRAM(S): 400 CAPSULE ORAL at 05:50

## 2023-09-06 RX ADMIN — APIXABAN 5 MILLIGRAM(S): 2.5 TABLET, FILM COATED ORAL at 05:51

## 2023-09-06 RX ADMIN — Medication 650 MILLIGRAM(S): at 16:25

## 2023-09-06 RX ADMIN — Medication 88 MICROGRAM(S): at 05:50

## 2023-09-06 RX ADMIN — Medication 0.25 MILLIGRAM(S): at 21:47

## 2023-09-06 RX ADMIN — Medication 81 MILLIGRAM(S): at 12:30

## 2023-09-06 RX ADMIN — PANTOPRAZOLE SODIUM 40 MILLIGRAM(S): 20 TABLET, DELAYED RELEASE ORAL at 05:52

## 2023-09-06 NOTE — PROGRESS NOTE ADULT - ASSESSMENT
Patient is a 77 y/o F with PMH transverse colon neoplasm s/p colectomy in [facility] 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 Patient  p/w weakness, lethargy, poor appetite, light headedness  and diarrhea.  Patient reports when her blood pressure was checked in the facility it was in the low 80s. CT A/P showed Status post colectomy with ileorectal anastomosis.Fluid throughout nonobstructed bowel and rectum, consistent with enteritis.  Extensive subcutaneous emphysema in the anterior abdominal wall. While   findings may be postoperative given the history of recent surgery, in the appropriate clinical context, necrotizing infection may be considered.   Careful clinical evaluation is therefore warranted.  Pt. admitted for enteritis vs. necrotizing fascitis in setting of recent  robotic surgery at [facility].  Pt. followed by surgery, no interventions indicated at this time.  ID Dr. Monreal following, no antibiotics indicated at this time.  Diet advanced to full liquid.  9/6-Leukocytosis trending up, afebrile.  UA positive, started on Levaquin IV Patient is a 77 y/o F with PMH transverse colon neoplasm s/p colectomy in [facility] 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 Patient  p/w weakness, lethargy, poor appetite, light headedness  and diarrhea.  Patient reports when her blood pressure was checked in the facility it was in the low 80s. CT A/P showed Status post colectomy with ileorectal anastomosis.Fluid throughout nonobstructed bowel and rectum, consistent with enteritis.  Extensive subcutaneous emphysema in the anterior abdominal wall. While   findings may be postoperative given the history of recent surgery, in the appropriate clinical context, necrotizing infection may be considered.   Careful clinical evaluation is therefore warranted.  Pt. admitted for enteritis vs. necrotizing fascitis in setting of recent  robotic surgery at [facility].  Pt. followed by surgery, no interventions indicated at this time.  ID Dr. Monreal following, no antibiotics indicated at this time.  Diet advanced to full liquid.  9/6-Leukocytosis trending up, afebrile.  UA positive, started on Levaquin IV Patient is a 77 y/o F with PMH transverse colon neoplasm s/p colectomy in [facility] 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 Patient  p/w weakness, lethargy, poor appetite, light headedness  and diarrhea.  Patient reports when her blood pressure was checked in the facility it was in the low 80s. CT A/P showed Status post colectomy with ileorectal anastomosis.Fluid throughout nonobstructed bowel and rectum, consistent with enteritis.  Extensive subcutaneous emphysema in the anterior abdominal wall. While   findings may be postoperative given the history of recent surgery, in the appropriate clinical context, necrotizing infection may be considered.   Careful clinical evaluation is therefore warranted.  Pt. admitted for enteritis vs. necrotizing fascitis in setting of recent  robotic surgery at [facility].  Pt. followed by surgery, no interventions indicated at this time.  ID Dr. Monreal following, no antibiotics indicated at this time.  Diet advanced to full liquid.  9/6-Leukocytosis trending up, afebrile.  UA positive, started on Levaquin IV Patient is a 79 y/o F with PMH transverse colon neoplasm s/p colectomy in [facility] 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 Patient  p/w weakness, lethargy, poor appetite, light headedness  and diarrhea.  Patient reports when her blood pressure was checked in the facility it was in the low 80s. CT A/P showed Status post colectomy with ileorectal anastomosis.Fluid throughout nonobstructed bowel and rectum, consistent with enteritis.  Extensive subcutaneous emphysema in the anterior abdominal wall. While   findings may be postoperative given the history of recent surgery, in the appropriate clinical context, necrotizing infection may be considered.   Careful clinical evaluation is therefore warranted.  Pt. admitted for enteritis vs. necrotizing fascitis in setting of recent  robotic surgery at [facility].  Pt. followed by surgery, no interventions indicated at this time.  ID Dr. Monreal following, no antibiotics indicated at this time.  Diet advanced to full liquid.  9/6-Leukocytosis trending up, afebrile.  UA positive, started on Levaquin IV. Patient is a 79 y/o F with PMH transverse colon neoplasm s/p colectomy in [facility] 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 Patient  p/w weakness, lethargy, poor appetite, light headedness  and diarrhea.  Patient reports when her blood pressure was checked in the facility it was in the low 80s. CT A/P showed Status post colectomy with ileorectal anastomosis.Fluid throughout nonobstructed bowel and rectum, consistent with enteritis.  Extensive subcutaneous emphysema in the anterior abdominal wall. While   findings may be postoperative given the history of recent surgery, in the appropriate clinical context, necrotizing infection may be considered.   Careful clinical evaluation is therefore warranted.  Pt. admitted for enteritis vs. necrotizing fascitis in setting of recent  robotic surgery at [facility].  Pt. followed by surgery, no interventions indicated at this time.  ID Dr. Monreal following, no antibiotics indicated at this time.  Diet advanced to full liquid.  9/6-Leukocytosis trending up, afebrile.  UA positive, started on Levaquin IV. Patient is a 79 y/o F with PMH transverse colon neoplasm s/p colectomy in [facility] 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 Patient  p/w weakness, lethargy, poor appetite, light headedness  and diarrhea.  Patient reports when her blood pressure was checked in the facility it was in the low 80s. CT A/P showed Status post colectomy with ileorectal anastomosis.Fluid throughout nonobstructed bowel and rectum, consistent with enteritis.  Extensive subcutaneous emphysema in the anterior abdominal wall. While   findings may be postoperative given the history of recent surgery, in the appropriate clinical context, necrotizing infection may be considered.   Careful clinical evaluation is therefore warranted.  Pt. admitted for enteritis vs. necrotizing fascitis in setting of recent  robotic surgery at [facility].  Pt. followed by surgery, no interventions indicated at this time.  ID Dr. Monreal following, no antibiotics indicated at this time.  Diet advanced to full liquid.  9/6-Leukocytosis trending up, afebrile.  UA positive, started on Levaquin IV.

## 2023-09-06 NOTE — PROGRESS NOTE ADULT - ASSESSMENT
79 y/o F with PMH transverse colon neoplasm s/p colectomy in [facility] 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 Patient reports that since yesterday she feels weak and tired,chronic diarrhea,hypotension, now ileus.  1.?ileus-resolved.  2.Chronic diarrhea.  3.Hypothyroid-synthroid.  4.Echocardiogram can be done as outpatient.  5.CAD-asa,b blocker,statin.  6.Afib-eliquis.  7.UTI-ABX as per ID.  8.GI prophylaxis. 77 y/o F with PMH transverse colon neoplasm s/p colectomy in [facility] 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 Patient reports that since yesterday she feels weak and tired,chronic diarrhea,hypotension, now ileus.  1.?ileus-resolved.  2.Chronic diarrhea.  3.Hypothyroid-synthroid.  4.Echocardiogram can be done as outpatient.  5.CAD-asa,b blocker,statin.  6.Afib-eliquis.  7.UTI-ABX as per ID.  8.GI prophylaxis. 77 y/o F with PMH transverse colon neoplasm s/p colectomy in [facility] 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 Patient reports that since yesterday she feels weak and tired,chronic diarrhea,hypotension, now ileus.  1.?ileus-resolved.  2.Chronic diarrhea.  3.Hypothyroid-synthroid.  4.Echocardiogram can be done as outpatient.  5.CAD-asa,b blocker,statin.  6.Afib-eliquis.  7.UTI-ABX as per ID.  8.GI prophylaxis.

## 2023-09-06 NOTE — PROGRESS NOTE ADULT - SUBJECTIVE AND OBJECTIVE BOX
Sweating Severity Scale: 4- The sweating is intolerable and always interferes with daily activities How Severe Is It?: moderate Is This A New Presentation, Or A Follow-Up?: Sweating   Patient is seen and examined at the bed side, is afebrile. The  repeat Urine analysis as of9/6/23 is positive.       REVIEW OF SYSTEMS: All other review systems are negative      ALLERGIES: Zosyn ( Swelling of lips and itching)       Vital Signs Last 24 Hrs  T(C): 36.6 (06 Sep 2023 05:20), Max: 36.7 (05 Sep 2023 20:26)  T(F): 97.8 (06 Sep 2023 05:20), Max: 98 (05 Sep 2023 20:26)  HR: 93 (06 Sep 2023 05:20) (78 - 94)  BP: 109/76 (06 Sep 2023 05:20) (99/64 - 112/82)  BP(mean): --  RR: 17 (06 Sep 2023 05:20) (17 - 17)  SpO2: 96% (06 Sep 2023 05:20) (96% - 99%)    Parameters below as of 06 Sep 2023 05:20  Patient On (Oxygen Delivery Method): room air        PHYSICAL EXAM:  GENERAL: Not in distress   CHEST/LUNG: Not using accessory muscles  HEART: s1 and s2 present  ABDOMEN:  Nontender and incision site healed  EXTREMITIES: No pedal  edema  CNS: Awake and Alert      LABS:                        13.5   17.87 )-----------( 592      ( 06 Sep 2023 10:25 )             41.4                           11.9   14.31 )-----------( 498      ( 05 Sep 2023 07:15 )             36.8                     09-06    136  |  105  |  41<H>  ----------------------------<  122<H>  4.8   |  18<L>  |  1.26    Ca    8.4      06 Sep 2023 07:30  Phos  3.7     09-05             09-05    139  |  108  |  27<H>  ----------------------------<  118<H>  3.7   |  21<L>  |  0.87    Ca    8.5      05 Sep 2023 07:15  Phos  3.7     09-05    TPro  6.0  /  Alb  2.1<L>  /  TBili  0.4  /  DBili  x   /  AST  8<L>  /  ALT  23  /  AlkPhos  103  08-31      Urinalysis Basic - ( 01 Sep 2023 08:24 )  Color: x / Appearance: x / SG: x / pH: x  Gluc: 102 mg/dL / Ketone: x  / Bili: x / Urobili: x   Blood: x / Protein: x / Nitrite: x   Leuk Esterase: x / RBC: x / WBC x   Sq Epi: x / Non Sq Epi: x / Bacteria: x        MEDICATIONS  (STANDING):    apixaban 5 milliGRAM(s) Oral every 12 hours  aspirin  chewable 81 milliGRAM(s) Oral daily  atorvastatin 20 milliGRAM(s) Oral at bedtime  gabapentin 100 milliGRAM(s) Oral every 8 hours  levETIRAcetam 750 milliGRAM(s) Oral two times a day  levothyroxine 88 MICROGram(s) Oral daily  melatonin 5 milliGRAM(s) Oral at bedtime  oxybutynin 10 milliGRAM(s) Oral daily  pantoprazole    Tablet 40 milliGRAM(s) Oral before breakfast        RADIOLOGY & ADDITIONAL TESTS:    9/2/23 : Xray Abdomen 1 View (09.02.23 @ 10:24) Markedly distended small bowel loops throughout the abdomen   and pelvis is similar to recent CT. The CT demonstrates a status post colectomy and ileorectal anastomosis. Evaluation for free air limited on   this supine projection Subcutaneous emphysema noted laterally corresponding to recent CT abnormality.      9/1/23 : CT Abdomen and Pelvis w/ Oral Cont (09.01.23 @ 15:20) Status post colectomy with ileorectal anastomosis.    Fluid throughout non- obstructed bowel and rectum, consistent with enteritis.    Extensive subcutaneous emphysema in the anterior abdominal wall. While findings may be postoperative given the history of recent surgery, in the   appropriate clinical context, necrotizing infection may be considered. Careful clinical evaluation is therefore warranted.    These findings were discussed with Dr. MARQUITA MORFIN [phone] at  9/1/2023 3:43 PM with readback.      8/31/23 : Xray Chest 1 View- PORTABLE-Urgent (Xray Chest 1 View- PORTABLE-Urgent .) (08.31.23 @ 23:17) No acute pulmonary disease.        MICROBIOLOGY DATA:    Urine Microscopic-Add On (NC) (09.06.23 @ 10:40)   Red Blood Cell - Urine: 5 /HPF  White Blood Cell - Urine: 20 /HPF  Bacteria: Many /HPF  Squamous Epithelial Cells: many    Clostridium difficile Toxin by PCR (09.02.23 @ 05:16)   C Diff by PCR Result: NotDetec:     Culture - Stool (09.01.23 @ 03:30)   Specimen Source: .Stool Feces  Culture Results:   No enteric pathogens to date: Final culture pending    GI PCR Panel Stool (09.01.23 @ 03:30)   GI PCR Panel: NotDetec:     Urine Microscopic-Add On (NC) (09.01.23 @ 03:10)   Red Blood Cell - Urine: 0 /HPF  White Blood Cell - Urine: 4 /HPF  Bacteria: Many /HPF  Comment - Urine: few amorphous urates  Squamous Epithelial Cells: Present    Rapid HIV-1/2 Antibody (08.31.23 @ 21:30)   Rapid HIV-1/2 Antibody: Nonreact:                                     Patient is seen and examined at the bed side, is afebrile. The  repeat Urine analysis as of9/6/23 is positive.       REVIEW OF SYSTEMS: All other review systems are negative      ALLERGIES: Zosyn ( Swelling of lips and itching)       Vital Signs Last 24 Hrs  T(C): 36.6 (06 Sep 2023 05:20), Max: 36.7 (05 Sep 2023 20:26)  T(F): 97.8 (06 Sep 2023 05:20), Max: 98 (05 Sep 2023 20:26)  HR: 93 (06 Sep 2023 05:20) (78 - 94)  BP: 109/76 (06 Sep 2023 05:20) (99/64 - 112/82)  BP(mean): --  RR: 17 (06 Sep 2023 05:20) (17 - 17)  SpO2: 96% (06 Sep 2023 05:20) (96% - 99%)    Parameters below as of 06 Sep 2023 05:20  Patient On (Oxygen Delivery Method): room air        PHYSICAL EXAM:  GENERAL: Not in distress   CHEST/LUNG: Not using accessory muscles  HEART: s1 and s2 present  ABDOMEN:  Nontender and incision site healed  EXTREMITIES: No pedal  edema  CNS: Awake and Alert      LABS:                        13.5   17.87 )-----------( 592      ( 06 Sep 2023 10:25 )             41.4                           11.9   14.31 )-----------( 498      ( 05 Sep 2023 07:15 )             36.8                     09-06    136  |  105  |  41<H>  ----------------------------<  122<H>  4.8   |  18<L>  |  1.26    Ca    8.4      06 Sep 2023 07:30  Phos  3.7     09-05             09-05    139  |  108  |  27<H>  ----------------------------<  118<H>  3.7   |  21<L>  |  0.87    Ca    8.5      05 Sep 2023 07:15  Phos  3.7     09-05    TPro  6.0  /  Alb  2.1<L>  /  TBili  0.4  /  DBili  x   /  AST  8<L>  /  ALT  23  /  AlkPhos  103  08-31      Urinalysis Basic - ( 01 Sep 2023 08:24 )  Color: x / Appearance: x / SG: x / pH: x  Gluc: 102 mg/dL / Ketone: x  / Bili: x / Urobili: x   Blood: x / Protein: x / Nitrite: x   Leuk Esterase: x / RBC: x / WBC x   Sq Epi: x / Non Sq Epi: x / Bacteria: x        MEDICATIONS  (STANDING):    apixaban 5 milliGRAM(s) Oral every 12 hours  aspirin  chewable 81 milliGRAM(s) Oral daily  atorvastatin 20 milliGRAM(s) Oral at bedtime  gabapentin 100 milliGRAM(s) Oral every 8 hours  levETIRAcetam 750 milliGRAM(s) Oral two times a day  levothyroxine 88 MICROGram(s) Oral daily  melatonin 5 milliGRAM(s) Oral at bedtime  oxybutynin 10 milliGRAM(s) Oral daily  pantoprazole    Tablet 40 milliGRAM(s) Oral before breakfast        RADIOLOGY & ADDITIONAL TESTS:    9/2/23 : Xray Abdomen 1 View (09.02.23 @ 10:24) Markedly distended small bowel loops throughout the abdomen   and pelvis is similar to recent CT. The CT demonstrates a status post colectomy and ileorectal anastomosis. Evaluation for free air limited on   this supine projection Subcutaneous emphysema noted laterally corresponding to recent CT abnormality.      9/1/23 : CT Abdomen and Pelvis w/ Oral Cont (09.01.23 @ 15:20) Status post colectomy with ileorectal anastomosis.    Fluid throughout non- obstructed bowel and rectum, consistent with enteritis.    Extensive subcutaneous emphysema in the anterior abdominal wall. While findings may be postoperative given the history of recent surgery, in the   appropriate clinical context, necrotizing infection may be considered. Careful clinical evaluation is therefore warranted.    These findings were discussed with Dr. MARQUITA MORFIN [phone] at  9/1/2023 3:43 PM with readback.      8/31/23 : Xray Chest 1 View- PORTABLE-Urgent (Xray Chest 1 View- PORTABLE-Urgent .) (08.31.23 @ 23:17) No acute pulmonary disease.        MICROBIOLOGY DATA:    Urine Microscopic-Add On (NC) (09.06.23 @ 10:40)   Red Blood Cell - Urine: 5 /HPF  White Blood Cell - Urine: 20 /HPF  Bacteria: Many /HPF  Squamous Epithelial Cells: many    Clostridium difficile Toxin by PCR (09.02.23 @ 05:16)   C Diff by PCR Result: NotDetec:     Culture - Stool (09.01.23 @ 03:30)   Specimen Source: .Stool Feces  Culture Results:   No enteric pathogens to date: Final culture pending    GI PCR Panel Stool (09.01.23 @ 03:30)   GI PCR Panel: NotDetec:     Urine Microscopic-Add On (NC) (09.01.23 @ 03:10)   Red Blood Cell - Urine: 0 /HPF  White Blood Cell - Urine: 4 /HPF  Bacteria: Many /HPF  Comment - Urine: few amorphous urates  Squamous Epithelial Cells: Present    Rapid HIV-1/2 Antibody (08.31.23 @ 21:30)   Rapid HIV-1/2 Antibody: Nonreact:                                     Patient is seen and examined at the bed side, is afebrile. The  repeat Urine analysis as of9/6/23 is positive.       REVIEW OF SYSTEMS: All other review systems are negative      ALLERGIES: Zosyn ( Swelling of lips and itching)       Vital Signs Last 24 Hrs  T(C): 36.6 (06 Sep 2023 05:20), Max: 36.7 (05 Sep 2023 20:26)  T(F): 97.8 (06 Sep 2023 05:20), Max: 98 (05 Sep 2023 20:26)  HR: 93 (06 Sep 2023 05:20) (78 - 94)  BP: 109/76 (06 Sep 2023 05:20) (99/64 - 112/82)  BP(mean): --  RR: 17 (06 Sep 2023 05:20) (17 - 17)  SpO2: 96% (06 Sep 2023 05:20) (96% - 99%)    Parameters below as of 06 Sep 2023 05:20  Patient On (Oxygen Delivery Method): room air        PHYSICAL EXAM:  GENERAL: Not in distress   CHEST/LUNG: Not using accessory muscles  HEART: s1 and s2 present  ABDOMEN:  Nontender and incision site healed  EXTREMITIES: No pedal  edema  CNS: Awake and Alert      LABS:                        13.5   17.87 )-----------( 592      ( 06 Sep 2023 10:25 )             41.4                           11.9   14.31 )-----------( 498      ( 05 Sep 2023 07:15 )             36.8                     09-06    136  |  105  |  41<H>  ----------------------------<  122<H>  4.8   |  18<L>  |  1.26    Ca    8.4      06 Sep 2023 07:30  Phos  3.7     09-05             09-05    139  |  108  |  27<H>  ----------------------------<  118<H>  3.7   |  21<L>  |  0.87    Ca    8.5      05 Sep 2023 07:15  Phos  3.7     09-05    TPro  6.0  /  Alb  2.1<L>  /  TBili  0.4  /  DBili  x   /  AST  8<L>  /  ALT  23  /  AlkPhos  103  08-31      Urinalysis Basic - ( 01 Sep 2023 08:24 )  Color: x / Appearance: x / SG: x / pH: x  Gluc: 102 mg/dL / Ketone: x  / Bili: x / Urobili: x   Blood: x / Protein: x / Nitrite: x   Leuk Esterase: x / RBC: x / WBC x   Sq Epi: x / Non Sq Epi: x / Bacteria: x        MEDICATIONS  (STANDING):    apixaban 5 milliGRAM(s) Oral every 12 hours  aspirin  chewable 81 milliGRAM(s) Oral daily  atorvastatin 20 milliGRAM(s) Oral at bedtime  gabapentin 100 milliGRAM(s) Oral every 8 hours  levETIRAcetam 750 milliGRAM(s) Oral two times a day  levothyroxine 88 MICROGram(s) Oral daily  melatonin 5 milliGRAM(s) Oral at bedtime  oxybutynin 10 milliGRAM(s) Oral daily  pantoprazole    Tablet 40 milliGRAM(s) Oral before breakfast        RADIOLOGY & ADDITIONAL TESTS:    9/2/23 : Xray Abdomen 1 View (09.02.23 @ 10:24) Markedly distended small bowel loops throughout the abdomen   and pelvis is similar to recent CT. The CT demonstrates a status post colectomy and ileorectal anastomosis. Evaluation for free air limited on   this supine projection Subcutaneous emphysema noted laterally corresponding to recent CT abnormality.      9/1/23 : CT Abdomen and Pelvis w/ Oral Cont (09.01.23 @ 15:20) Status post colectomy with ileorectal anastomosis.    Fluid throughout non- obstructed bowel and rectum, consistent with enteritis.    Extensive subcutaneous emphysema in the anterior abdominal wall. While findings may be postoperative given the history of recent surgery, in the   appropriate clinical context, necrotizing infection may be considered. Careful clinical evaluation is therefore warranted.    These findings were discussed with Dr. MARQUITA MORFIN [phone] at  9/1/2023 3:43 PM with readback.      8/31/23 : Xray Chest 1 View- PORTABLE-Urgent (Xray Chest 1 View- PORTABLE-Urgent .) (08.31.23 @ 23:17) No acute pulmonary disease.        MICROBIOLOGY DATA:    Urine Microscopic-Add On (NC) (09.06.23 @ 10:40)   Red Blood Cell - Urine: 5 /HPF  White Blood Cell - Urine: 20 /HPF  Bacteria: Many /HPF  Squamous Epithelial Cells: many    Clostridium difficile Toxin by PCR (09.02.23 @ 05:16)   C Diff by PCR Result: NotDetec:     Culture - Stool (09.01.23 @ 03:30)   Specimen Source: .Stool Feces  Culture Results:   No enteric pathogens to date: Final culture pending    GI PCR Panel Stool (09.01.23 @ 03:30)   GI PCR Panel: NotDetec:     Urine Microscopic-Add On (NC) (09.01.23 @ 03:10)   Red Blood Cell - Urine: 0 /HPF  White Blood Cell - Urine: 4 /HPF  Bacteria: Many /HPF  Comment - Urine: few amorphous urates  Squamous Epithelial Cells: Present    Rapid HIV-1/2 Antibody (08.31.23 @ 21:30)   Rapid HIV-1/2 Antibody: Nonreact:                                     Patient is seen and examined at the bed side, is afebrile. The  repeat Urine analysis as of 9/6/23 is positive, most likely in the setting of diarrhea, since Ileus resolved she start having her chronic diarrhea.       REVIEW OF SYSTEMS: All other review systems are negative      ALLERGIES: Zosyn ( Swelling of lips and itching)       Vital Signs Last 24 Hrs  T(C): 36.6 (06 Sep 2023 05:20), Max: 36.7 (05 Sep 2023 20:26)  T(F): 97.8 (06 Sep 2023 05:20), Max: 98 (05 Sep 2023 20:26)  HR: 93 (06 Sep 2023 05:20) (78 - 94)  BP: 109/76 (06 Sep 2023 05:20) (99/64 - 112/82)  BP(mean): --  RR: 17 (06 Sep 2023 05:20) (17 - 17)  SpO2: 96% (06 Sep 2023 05:20) (96% - 99%)    Parameters below as of 06 Sep 2023 05:20  Patient On (Oxygen Delivery Method): room air        PHYSICAL EXAM:  GENERAL: Not in distress   CHEST/LUNG: Not using accessory muscles  HEART: s1 and s2 present  ABDOMEN:  Nontender and incision site healed  EXTREMITIES: No pedal  edema  CNS: Awake and Alert      LABS:                        13.5   17.87 )-----------( 592      ( 06 Sep 2023 10:25 )             41.4                           11.9   14.31 )-----------( 498      ( 05 Sep 2023 07:15 )             36.8                     09-06    136  |  105  |  41<H>  ----------------------------<  122<H>  4.8   |  18<L>  |  1.26    Ca    8.4      06 Sep 2023 07:30  Phos  3.7     09-05             09-05    139  |  108  |  27<H>  ----------------------------<  118<H>  3.7   |  21<L>  |  0.87    Ca    8.5      05 Sep 2023 07:15  Phos  3.7     09-05    TPro  6.0  /  Alb  2.1<L>  /  TBili  0.4  /  DBili  x   /  AST  8<L>  /  ALT  23  /  AlkPhos  103  08-31        Urine Microscopic-Add On (NC) (09.01.23 @ 03:10)   Red Blood Cell - Urine: 0 /HPF  White Blood Cell - Urine: 4 /HPF  Bacteria: Many /HPF  Comment - Urine: few amorphous urates  Squamous Epithelial Cells: Present      MEDICATIONS  (STANDING):    apixaban 5 milliGRAM(s) Oral every 12 hours  aspirin  chewable 81 milliGRAM(s) Oral daily  atorvastatin 20 milliGRAM(s) Oral at bedtime  gabapentin 100 milliGRAM(s) Oral every 8 hours  levETIRAcetam 750 milliGRAM(s) Oral two times a day  levothyroxine 88 MICROGram(s) Oral daily  melatonin 5 milliGRAM(s) Oral at bedtime  oxybutynin 10 milliGRAM(s) Oral daily  pantoprazole    Tablet 40 milliGRAM(s) Oral before breakfast        RADIOLOGY & ADDITIONAL TESTS:    9/2/23 : Xray Abdomen 1 View (09.02.23 @ 10:24) Markedly distended small bowel loops throughout the abdomen   and pelvis is similar to recent CT. The CT demonstrates a status post colectomy and ileorectal anastomosis. Evaluation for free air limited on   this supine projection Subcutaneous emphysema noted laterally corresponding to recent CT abnormality.      9/1/23 : CT Abdomen and Pelvis w/ Oral Cont (09.01.23 @ 15:20) Status post colectomy with ileorectal anastomosis.    Fluid throughout non- obstructed bowel and rectum, consistent with enteritis.    Extensive subcutaneous emphysema in the anterior abdominal wall. While findings may be postoperative given the history of recent surgery, in the   appropriate clinical context, necrotizing infection may be considered. Careful clinical evaluation is therefore warranted.    These findings were discussed with Dr. MARQUITA MORFIN [phone] at  9/1/2023 3:43 PM with readback.      8/31/23 : Xray Chest 1 View- PORTABLE-Urgent (Xray Chest 1 View- PORTABLE-Urgent .) (08.31.23 @ 23:17) No acute pulmonary disease.        MICROBIOLOGY DATA:    Urine Microscopic-Add On (NC) (09.06.23 @ 10:40)   Red Blood Cell - Urine: 5 /HPF  White Blood Cell - Urine: 20 /HPF  Bacteria: Many /HPF  Squamous Epithelial Cells: many    Clostridium difficile Toxin by PCR (09.02.23 @ 05:16)   C Diff by PCR Result: NotDetec:     Culture - Stool (09.01.23 @ 03:30)   Specimen Source: .Stool Feces  Culture Results:   No enteric pathogens to date: Final culture pending    GI PCR Panel Stool (09.01.23 @ 03:30)   GI PCR Panel: NotDetec:     Urine Microscopic-Add On (NC) (09.01.23 @ 03:10)   Red Blood Cell - Urine: 0 /HPF  White Blood Cell - Urine: 4 /HPF  Bacteria: Many /HPF  Comment - Urine: few amorphous urates  Squamous Epithelial Cells: Present    Rapid HIV-1/2 Antibody (08.31.23 @ 21:30)   Rapid HIV-1/2 Antibody: Nonreact:                                     Patient is seen and examined at the bed side, is afebrile. The  repeat Urine analysis as of 9/6/23 is positive, most likely in the setting of diarrhea, since Ileus resolved she start having her chronic diarrhea.       REVIEW OF SYSTEMS: All other review systems are negative      ALLERGIES: Zosyn ( Swelling of lips and itching)       Vital Signs Last 24 Hrs  T(C): 36.6 (06 Sep 2023 05:20), Max: 36.7 (05 Sep 2023 20:26)  T(F): 97.8 (06 Sep 2023 05:20), Max: 98 (05 Sep 2023 20:26)  HR: 93 (06 Sep 2023 05:20) (78 - 94)  BP: 109/76 (06 Sep 2023 05:20) (99/64 - 112/82)  BP(mean): --  RR: 17 (06 Sep 2023 05:20) (17 - 17)  SpO2: 96% (06 Sep 2023 05:20) (96% - 99%)    Parameters below as of 06 Sep 2023 05:20  Patient On (Oxygen Delivery Method): room air        PHYSICAL EXAM:  GENERAL: Not in distress   CHEST/LUNG: Not using accessory muscles  HEART: s1 and s2 present  ABDOMEN:  Nontender and incision site healed  EXTREMITIES: No pedal  edema  CNS: Awake and Alert      LABS:                        13.5   17.87 )-----------( 592      ( 06 Sep 2023 10:25 )             41.4                           11.9   14.31 )-----------( 498      ( 05 Sep 2023 07:15 )             36.8                     09-06    136  |  105  |  41<H>  ----------------------------<  122<H>  4.8   |  18<L>  |  1.26    Ca    8.4      06 Sep 2023 07:30  Phos  3.7     09-05             09-05    139  |  108  |  27<H>  ----------------------------<  118<H>  3.7   |  21<L>  |  0.87    Ca    8.5      05 Sep 2023 07:15  Phos  3.7     09-05    TPro  6.0  /  Alb  2.1<L>  /  TBili  0.4  /  DBili  x   /  AST  8<L>  /  ALT  23  /  AlkPhos  103  08-31        Urine Microscopic-Add On (NC) (09.01.23 @ 03:10)   Red Blood Cell - Urine: 0 /HPF  White Blood Cell - Urine: 4 /HPF  Bacteria: Many /HPF  Comment - Urine: few amorphous urates  Squamous Epithelial Cells: Present      MEDICATIONS  (STANDING):    apixaban 5 milliGRAM(s) Oral every 12 hours  aspirin  chewable 81 milliGRAM(s) Oral daily  atorvastatin 20 milliGRAM(s) Oral at bedtime  gabapentin 100 milliGRAM(s) Oral every 8 hours  levETIRAcetam 750 milliGRAM(s) Oral two times a day  levothyroxine 88 MICROGram(s) Oral daily  melatonin 5 milliGRAM(s) Oral at bedtime  oxybutynin 10 milliGRAM(s) Oral daily  pantoprazole    Tablet 40 milliGRAM(s) Oral before breakfast        RADIOLOGY & ADDITIONAL TESTS:    9/2/23 : Xray Abdomen 1 View (09.02.23 @ 10:24) Markedly distended small bowel loops throughout the abdomen   and pelvis is similar to recent CT. The CT demonstrates a status post colectomy and ileorectal anastomosis. Evaluation for free air limited on   this supine projection Subcutaneous emphysema noted laterally corresponding to recent CT abnormality.      9/1/23 : CT Abdomen and Pelvis w/ Oral Cont (09.01.23 @ 15:20) Status post colectomy with ileorectal anastomosis.    Fluid throughout non- obstructed bowel and rectum, consistent with enteritis.    Extensive subcutaneous emphysema in the anterior abdominal wall. While findings may be postoperative given the history of recent surgery, in the   appropriate clinical context, necrotizing infection may be considered. Careful clinical evaluation is therefore warranted.    These findings were discussed with Dr. MARQUITA MORFIN [phone] at  9/1/2023 3:43 PM with readback.      8/31/23 : Xray Chest 1 View- PORTABLE-Urgent (Xray Chest 1 View- PORTABLE-Urgent .) (08.31.23 @ 23:17) No acute pulmonary disease.        MICROBIOLOGY DATA:    Urine Microscopic-Add On (NC) (09.06.23 @ 10:40)   Red Blood Cell - Urine: 5 /HPF  White Blood Cell - Urine: 20 /HPF  Bacteria: Many /HPF  Squamous Epithelial Cells: many    Clostridium difficile Toxin by PCR (09.02.23 @ 05:16)   C Diff by PCR Result: NotDetec:     Culture - Stool (09.01.23 @ 03:30)   Specimen Source: .Stool Feces  Culture Results:   No enteric pathogens to date: Final culture pending    GI PCR Panel Stool (09.01.23 @ 03:30)   GI PCR Panel: NotDetec:     Urine Microscopic-Add On (NC) (09.01.23 @ 03:10)   Red Blood Cell - Urine: 0 /HPF  White Blood Cell - Urine: 4 /HPF  Bacteria: Many /HPF  Comment - Urine: few amorphous urates  Squamous Epithelial Cells: Present    Rapid HIV-1/2 Antibody (08.31.23 @ 21:30)   Rapid HIV-1/2 Antibody: Nonreact:                                     Patient is seen and examined at the bed side, is afebrile. The  repeat Urine analysis as of 9/6/23 is positive, most likely in the setting of diarrhea, since Ileus resolved she start having her chronic diarrhea.       REVIEW OF SYSTEMS: All other review systems are negative      ALLERGIES: Zosyn ( Swelling of lips and itching)       Vital Signs Last 24 Hrs  T(C): 36.6 (06 Sep 2023 05:20), Max: 36.7 (05 Sep 2023 20:26)  T(F): 97.8 (06 Sep 2023 05:20), Max: 98 (05 Sep 2023 20:26)  HR: 93 (06 Sep 2023 05:20) (78 - 94)  BP: 109/76 (06 Sep 2023 05:20) (99/64 - 112/82)  BP(mean): --  RR: 17 (06 Sep 2023 05:20) (17 - 17)  SpO2: 96% (06 Sep 2023 05:20) (96% - 99%)    Parameters below as of 06 Sep 2023 05:20  Patient On (Oxygen Delivery Method): room air        PHYSICAL EXAM:  GENERAL: Not in distress   CHEST/LUNG: Not using accessory muscles  HEART: s1 and s2 present  ABDOMEN:  Nontender and incision site healed  EXTREMITIES: No pedal  edema  CNS: Awake and Alert      LABS:                        13.5   17.87 )-----------( 592      ( 06 Sep 2023 10:25 )             41.4                           11.9   14.31 )-----------( 498      ( 05 Sep 2023 07:15 )             36.8                     09-06    136  |  105  |  41<H>  ----------------------------<  122<H>  4.8   |  18<L>  |  1.26    Ca    8.4      06 Sep 2023 07:30  Phos  3.7     09-05             09-05    139  |  108  |  27<H>  ----------------------------<  118<H>  3.7   |  21<L>  |  0.87    Ca    8.5      05 Sep 2023 07:15  Phos  3.7     09-05    TPro  6.0  /  Alb  2.1<L>  /  TBili  0.4  /  DBili  x   /  AST  8<L>  /  ALT  23  /  AlkPhos  103  08-31        Urine Microscopic-Add On (NC) (09.01.23 @ 03:10)   Red Blood Cell - Urine: 0 /HPF  White Blood Cell - Urine: 4 /HPF  Bacteria: Many /HPF  Comment - Urine: few amorphous urates  Squamous Epithelial Cells: Present      MEDICATIONS  (STANDING):    apixaban 5 milliGRAM(s) Oral every 12 hours  aspirin  chewable 81 milliGRAM(s) Oral daily  atorvastatin 20 milliGRAM(s) Oral at bedtime  gabapentin 100 milliGRAM(s) Oral every 8 hours  levETIRAcetam 750 milliGRAM(s) Oral two times a day  levothyroxine 88 MICROGram(s) Oral daily  melatonin 5 milliGRAM(s) Oral at bedtime  oxybutynin 10 milliGRAM(s) Oral daily  pantoprazole    Tablet 40 milliGRAM(s) Oral before breakfast        RADIOLOGY & ADDITIONAL TESTS:    9/2/23 : Xray Abdomen 1 View (09.02.23 @ 10:24) Markedly distended small bowel loops throughout the abdomen   and pelvis is similar to recent CT. The CT demonstrates a status post colectomy and ileorectal anastomosis. Evaluation for free air limited on   this supine projection Subcutaneous emphysema noted laterally corresponding to recent CT abnormality.      9/1/23 : CT Abdomen and Pelvis w/ Oral Cont (09.01.23 @ 15:20) Status post colectomy with ileorectal anastomosis.    Fluid throughout non- obstructed bowel and rectum, consistent with enteritis.    Extensive subcutaneous emphysema in the anterior abdominal wall. While findings may be postoperative given the history of recent surgery, in the   appropriate clinical context, necrotizing infection may be considered. Careful clinical evaluation is therefore warranted.    These findings were discussed with Dr. MARQUITA MORFIN [phone] at  9/1/2023 3:43 PM with readback.      8/31/23 : Xray Chest 1 View- PORTABLE-Urgent (Xray Chest 1 View- PORTABLE-Urgent .) (08.31.23 @ 23:17) No acute pulmonary disease.        MICROBIOLOGY DATA:    Urine Microscopic-Add On (NC) (09.06.23 @ 10:40)   Red Blood Cell - Urine: 5 /HPF  White Blood Cell - Urine: 20 /HPF  Bacteria: Many /HPF  Squamous Epithelial Cells: many    Clostridium difficile Toxin by PCR (09.02.23 @ 05:16)   C Diff by PCR Result: NotDetec:     Culture - Stool (09.01.23 @ 03:30)   Specimen Source: .Stool Feces  Culture Results:   No enteric pathogens to date: Final culture pending    GI PCR Panel Stool (09.01.23 @ 03:30)   GI PCR Panel: NotDetec:     Urine Microscopic-Add On (NC) (09.01.23 @ 03:10)   Red Blood Cell - Urine: 0 /HPF  White Blood Cell - Urine: 4 /HPF  Bacteria: Many /HPF  Comment - Urine: few amorphous urates  Squamous Epithelial Cells: Present    Rapid HIV-1/2 Antibody (08.31.23 @ 21:30)   Rapid HIV-1/2 Antibody: Nonreact:

## 2023-09-06 NOTE — PROGRESS NOTE ADULT - ASSESSMENT
1. Diarrhea  2. Anemia  3. No evidence of acute GI bleeding  4. Leukocytosis       Suggestions:    1. Diet as tolerated  2. Antibiotics as per ID  3. ID follow up  4. Monitor electrolytes  5. IVF hydration  6. Monitor H/H  7. Transfuse PRBC as needed  8. Surgical follow up  9. Avoid NSAID  10. Protonix daily  11. DVT prophylaxis

## 2023-09-06 NOTE — PROGRESS NOTE ADULT - ASSESSMENT
Patient is a 78y old  Female with PMH of transverse colon neoplasm s/p colectomy in [facility] in early August, HTN, HLD, CAD s/p LHC with TALI to RCA, Atrial fibrillation s/p watchman implant (3/23), hypothyroidism, PUJA (not on cpap), TIA, now presents to the ER for evaluation of weak, tired, lightheaded and low blood pressure, in the low 80s. Patient reports that since her surgery earlier in August she is not eating much at all and  has had diarrhea. She used to have 5-6 episodes of loose watery bowel movements every day and now have improved to 2-3 bowel movements daily. She has no fever or chills. ON Admission, she found to have no fever but Low Blood pressure, BP of 93/62. The stool studies are in process. The ID consult requested to assist with further evaluation and antibiotic management.     # Hypotension - resolved  # R/O Infectious etiology of diarrhea- C.difficile PCR and GI pathogen PCR is negative   #Enteritis and Necrotizing  fasciitis - CT abd/pelvis shows " Extensive subcutaneous emphysema in the anterior abdominal wall. While findings may be postoperative given the history of recent surgery, in the appropriate clinical context, necrotizing infection may be considered."  - As per surgery " Extensive subcutaneous emphysema in the anterior abdominal wall most likely related to recent surgery, necrotizing fascitis very unlikely."   # UTI- as of 9/6/23, UA is positive    would recommend:    1.Please start Ceftriaxone since Urine analysis as of today, 9/6/23, is positive  2. Advanced diet as tolerated  3. Monitor WBC count, multifactorial   4. OOb to chair    d/w Dr. Briggs, Patient     Attending Attestation:    Spent more than 35 minutes on total encounter, more than 50 % of the visit was spent counseling and/or coordinating care by the Attending physician.               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 Patient reports that since her surgery earlier in August she is not eating much at all and  has had diarrhea. She used to have 5-6 episodes of loose watery bowel movements every day and now have improved to 2-3 bowel movements daily. She has no fever or chills. ON Admission, she found to have no fever but Low Blood pressure, BP of 93/62. The stool studies are in process. The ID consult requested to assist with further evaluation and antibiotic management.     # Hypotension - resolved  # R/O Infectious etiology of diarrhea- C.difficile PCR and GI pathogen PCR is negative   #Enteritis and Necrotizing  fasciitis - CT abd/pelvis shows " Extensive subcutaneous emphysema in the anterior abdominal wall. While findings may be postoperative given the history of recent surgery, in the appropriate clinical context, necrotizing infection may be considered."  - As per surgery " Extensive subcutaneous emphysema in the anterior abdominal wall most likely related to recent surgery, necrotizing fascitis very unlikely."   # UTI- as of 9/6/23, UA is positive    would recommend:    1.Please start Ceftriaxone since Urine analysis as of today, 9/6/23, is positive  2. Advanced diet as tolerated  3. Monitor WBC count, multifactorial   4. OOb to chair    d/w Dr. Briggs, Patient     Attending Attestation:    Spent more than 35 minutes on total encounter, more than 50 % of the visit was spent counseling and/or coordinating care by the Attending physician.               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 Patient reports that since her surgery earlier in August she is not eating much at all and  has had diarrhea. She used to have 5-6 episodes of loose watery bowel movements every day and now have improved to 2-3 bowel movements daily. She has no fever or chills. ON Admission, she found to have no fever but Low Blood pressure, BP of 93/62. The stool studies are in process. The ID consult requested to assist with further evaluation and antibiotic management.     # Hypotension - resolved  # R/O Infectious etiology of diarrhea- C.difficile PCR and GI pathogen PCR is negative   #Enteritis and Necrotizing  fasciitis - CT abd/pelvis shows " Extensive subcutaneous emphysema in the anterior abdominal wall. While findings may be postoperative given the history of recent surgery, in the appropriate clinical context, necrotizing infection may be considered."  - As per surgery " Extensive subcutaneous emphysema in the anterior abdominal wall most likely related to recent surgery, necrotizing fascitis very unlikely."   # UTI- as of 9/6/23, UA is positive    would recommend:    1.Please start Ceftriaxone since Urine analysis as of today, 9/6/23, is positive  2. Advanced diet as tolerated  3. Monitor WBC count, multifactorial   4. OOb to chair    d/w Dr. Briggs, Patient     Attending Attestation:    Spent more than 35 minutes on total encounter, more than 50 % of the visit was spent counseling and/or coordinating care by the Attending physician.         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 Patient reports that since her surgery earlier in August she is not eating much at all and  has had diarrhea. She used to have 5-6 episodes of loose watery bowel movements every day and now have improved to 2-3 bowel movements daily. She has no fever or chills. ON Admission, she found to have no fever but Low Blood pressure, BP of 93/62. The stool studies are in process. The ID consult requested to assist with further evaluation and antibiotic management.     # Hypotension - resolved  # R/O Infectious etiology of diarrhea- C.difficile PCR and GI pathogen PCR is negative   #Enteritis and Necrotizing  fasciitis - CT abd/pelvis shows " Extensive subcutaneous emphysema in the anterior abdominal wall. While findings may be postoperative given the history of recent surgery, in the appropriate clinical context, necrotizing infection may be considered."  - As per surgery " Extensive subcutaneous emphysema in the anterior abdominal wall most likely related to recent surgery, necrotizing fascitis very unlikely."   # UTI- as of 9/6/23, UA is positive    would recommend:    1. Please start Levaquin ( Qtc is 457) since Allergic to PCN , in the setting of positive Urine analysis as of today, 9/6/23  2. Advanced diet as tolerated  3. Monitor WBC count, multifactorial   4. OOb to chair    d/w Dr. Briggs, and  Patient     Attending Attestation:    Spent more than 35 minutes on total encounter, more than 50 % of the visit was spent counseling and/or coordinating care by the Attending physician.         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 Patient reports that since her surgery earlier in August she is not eating much at all and  has had diarrhea. She used to have 5-6 episodes of loose watery bowel movements every day and now have improved to 2-3 bowel movements daily. She has no fever or chills. ON Admission, she found to have no fever but Low Blood pressure, BP of 93/62. The stool studies are in process. The ID consult requested to assist with further evaluation and antibiotic management.     # Hypotension - resolved  # R/O Infectious etiology of diarrhea- C.difficile PCR and GI pathogen PCR is negative   #Enteritis and Necrotizing  fasciitis - CT abd/pelvis shows " Extensive subcutaneous emphysema in the anterior abdominal wall. While findings may be postoperative given the history of recent surgery, in the appropriate clinical context, necrotizing infection may be considered."  - As per surgery " Extensive subcutaneous emphysema in the anterior abdominal wall most likely related to recent surgery, necrotizing fascitis very unlikely."   # UTI- as of 9/6/23, UA is positive    would recommend:    1. Please start Levaquin ( Qtc is 457) since Allergic to PCN , in the setting of positive Urine analysis as of today, 9/6/23  2. Advanced diet as tolerated  3. Monitor WBC count, multifactorial   4. OOb to chair    d/w Dr. Briggs, and  Patient     Attending Attestation:    Spent more than 35 minutes on total encounter, more than 50 % of the visit was spent counseling and/or coordinating care by the Attending physician.         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 Patient reports that since her surgery earlier in August she is not eating much at all and  has had diarrhea. She used to have 5-6 episodes of loose watery bowel movements every day and now have improved to 2-3 bowel movements daily. She has no fever or chills. ON Admission, she found to have no fever but Low Blood pressure, BP of 93/62. The stool studies are in process. The ID consult requested to assist with further evaluation and antibiotic management.     # Hypotension - resolved  # R/O Infectious etiology of diarrhea- C.difficile PCR and GI pathogen PCR is negative   #Enteritis and Necrotizing  fasciitis - CT abd/pelvis shows " Extensive subcutaneous emphysema in the anterior abdominal wall. While findings may be postoperative given the history of recent surgery, in the appropriate clinical context, necrotizing infection may be considered."  - As per surgery " Extensive subcutaneous emphysema in the anterior abdominal wall most likely related to recent surgery, necrotizing fascitis very unlikely."   # UTI- as of 9/6/23, UA is positive    would recommend:    1. Please start Levaquin ( Qtc is 457) since Allergic to PCN , in the setting of positive Urine analysis as of today, 9/6/23  2. Advanced diet as tolerated  3. Monitor WBC count, multifactorial   4. OOb to chair    d/w Dr. Briggs, and  Patient     Attending Attestation:    Spent more than 35 minutes on total encounter, more than 50 % of the visit was spent counseling and/or coordinating care by the Attending physician.         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 Patient reports that since her surgery earlier in August she is not eating much at all and  has had diarrhea. She used to have 5-6 episodes of loose watery bowel movements every day and now have improved to 2-3 bowel movements daily. She has no fever or chills. ON Admission, she found to have no fever but Low Blood pressure, BP of 93/62. The stool studies are in process. The ID consult requested to assist with further evaluation and antibiotic management.     # Hypotension - resolved  # R/O Infectious etiology of diarrhea- C.difficile PCR and GI pathogen PCR is negative   #Enteritis and Necrotizing  fasciitis - CT abd/pelvis shows " Extensive subcutaneous emphysema in the anterior abdominal wall. While findings may be postoperative given the history of recent surgery, in the appropriate clinical context, necrotizing infection may be considered."  - As per surgery " Extensive subcutaneous emphysema in the anterior abdominal wall most likely related to recent surgery, necrotizing fascitis very unlikely."   # UTI- ( White Blood Cell - Urine: 20 /HPF ) -as of 9/6/23, UA is positive in the setting of diarrhea     would recommend:    1. Please start Levaquin ( Qtc is 457) since Allergic to PCN , in the setting of positive Urine analysis as of today, 9/6/23, no culture noted in the system  2. Advanced diet as tolerated  3. Monitor WBC count, multifactorial   4. Pain management as needed   5. OOB to chair    d/w Dr. Briggs, covering NP, Sandra and  Patient     Attending Attestation:    Spent more than 35 minutes on total encounter, more than 50 % of the visit was spent counseling and/or coordinating care by the Attending physician.         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 Patient reports that since her surgery earlier in August she is not eating much at all and  has had diarrhea. She used to have 5-6 episodes of loose watery bowel movements every day and now have improved to 2-3 bowel movements daily. She has no fever or chills. ON Admission, she found to have no fever but Low Blood pressure, BP of 93/62. The stool studies are in process. The ID consult requested to assist with further evaluation and antibiotic management.     # Hypotension - resolved  # R/O Infectious etiology of diarrhea- C.difficile PCR and GI pathogen PCR is negative   #Enteritis and Necrotizing  fasciitis - CT abd/pelvis shows " Extensive subcutaneous emphysema in the anterior abdominal wall. While findings may be postoperative given the history of recent surgery, in the appropriate clinical context, necrotizing infection may be considered."  - As per surgery " Extensive subcutaneous emphysema in the anterior abdominal wall most likely related to recent surgery, necrotizing fascitis very unlikely."   # UTI- ( White Blood Cell - Urine: 20 /HPF ) -as of 9/6/23, UA is positive in the setting of diarrhea     would recommend:    1. Please start Levaquin ( Qtc is 457) since Allergic to PCN , in the setting of positive Urine analysis as of today, 9/6/23, no culture noted in the system  2. Advanced diet as tolerated  3. Monitor WBC count, multifactorial   4. Pain management as needed   5. OOB to chair    d/w Dr. Briggs, covering NP, Sandra and  Patient     Attending Attestation:    Spent more than 35 minutes on total encounter, more than 50 % of the visit was spent counseling and/or coordinating care by the Attending physician.         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 Patient reports that since her surgery earlier in August she is not eating much at all and  has had diarrhea. She used to have 5-6 episodes of loose watery bowel movements every day and now have improved to 2-3 bowel movements daily. She has no fever or chills. ON Admission, she found to have no fever but Low Blood pressure, BP of 93/62. The stool studies are in process. The ID consult requested to assist with further evaluation and antibiotic management.     # Hypotension - resolved  # R/O Infectious etiology of diarrhea- C.difficile PCR and GI pathogen PCR is negative   #Enteritis and Necrotizing  fasciitis - CT abd/pelvis shows " Extensive subcutaneous emphysema in the anterior abdominal wall. While findings may be postoperative given the history of recent surgery, in the appropriate clinical context, necrotizing infection may be considered."  - As per surgery " Extensive subcutaneous emphysema in the anterior abdominal wall most likely related to recent surgery, necrotizing fascitis very unlikely."   # UTI- ( White Blood Cell - Urine: 20 /HPF ) -as of 9/6/23, UA is positive in the setting of diarrhea     would recommend:    1. Please start Levaquin ( Qtc is 457) since Allergic to PCN , in the setting of positive Urine analysis as of today, 9/6/23, no culture noted in the system  2. Advanced diet as tolerated  3. Monitor WBC count, multifactorial   4. Pain management as needed   5. OOB to chair    d/w Dr. Briggs, covering NP, Sandra and  Patient     Attending Attestation:    Spent more than 35 minutes on total encounter, more than 50 % of the visit was spent counseling and/or coordinating care by the Attending physician.

## 2023-09-06 NOTE — PROGRESS NOTE ADULT - PROBLEM SELECTOR PLAN 1
P/w diarrhea associated with weakness since early august s/p colectomy surgery.  -CT A/P as above  -CLD advanced to full liquid diet  -GI PCR, ova and parasite, and stool culture negative  -ID Dr. Monreal following  -No Abx indicated at this time P/w diarrhea associated with weakness since early august s/p colectomy surgery.  -CT A/P as above  -Full liquid diet advanced to DASH diet, so far tolerating  -GI PCR, ova and parasite, and stool culture negative  -ID Dr. Monreal following  -Having several episodes watery diarrhea today (9/6)

## 2023-09-06 NOTE — PROGRESS NOTE ADULT - SUBJECTIVE AND OBJECTIVE BOX
Date of Service 09-06-23 @ 12:10    CHIEF COMPLAINT:Patient is a 78y old  Female who presents with a chief complaint of Enteritis.Pt appears comfortable.    	  REVIEW OF SYSTEMS:  CONSTITUTIONAL: No fever, weight loss, or fatigue  EYES: No eye pain, visual disturbances, or discharge  ENT:  No difficulty hearing, tinnitus, vertigo; No sinus or throat pain  NECK: No pain or stiffness  RESPIRATORY: No cough, wheezing, chills or hemoptysis; No Shortness of Breath  CARDIOVASCULAR: No chest pain, palpitations, passing out, dizziness, or leg swelling  GASTROINTESTINAL: No abdominal or epigastric pain. No nausea, vomiting, or hematemesis; No diarrhea or constipation. No melena or hematochezia.  GENITOURINARY: No dysuria, frequency, hematuria, or incontinence  NEUROLOGICAL: No headaches, memory loss, loss of strength, numbness, or tremors  SKIN: No itching, burning, rashes, or lesions   LYMPH Nodes: No enlarged glands  ENDOCRINE: No heat or cold intolerance; No hair loss  MUSCULOSKELETAL: No joint pain or swelling; No muscle, back, or extremity pain  PSYCHIATRIC: No depression, anxiety, mood swings, or difficulty sleeping  HEME/LYMPH: No easy bruising, or bleeding gums  ALLERGY AND IMMUNOLOGIC: No hives or eczema	        PHYSICAL EXAM:  T(C): 36.6 (09-06-23 @ 05:20), Max: 36.7 (09-05-23 @ 20:26)  HR: 93 (09-06-23 @ 05:20) (78 - 94)  BP: 109/76 (09-06-23 @ 05:20) (99/64 - 112/82)  RR: 17 (09-06-23 @ 05:20) (17 - 17)  SpO2: 96% (09-06-23 @ 05:20) (96% - 99%)  Wt(kg): --  I&O's Summary      Appearance: Normal	  HEENT:   Normal oral mucosa, PERRL, EOMI	  Lymphatic: No lymphadenopathy  Cardiovascular: Normal S1 S2, No JVD, No murmurs, No edema  Respiratory: Lungs clear to auscultation	  Psychiatry: A & O x 3, Mood & affect appropriate  Gastrointestinal:  Soft, Non-tender, + BS	  Skin: No rashes, No ecchymoses, No cyanosis	  Neurologic: Non-focal  Extremities: Normal range of motion, No clubbing, cyanosis or edema  Vascular: Peripheral pulses palpable 2+ bilaterally    MEDICATIONS  (STANDING):  apixaban 5 milliGRAM(s) Oral every 12 hours  aspirin  chewable 81 milliGRAM(s) Oral daily  atorvastatin 20 milliGRAM(s) Oral at bedtime  gabapentin 100 milliGRAM(s) Oral every 8 hours  levETIRAcetam 750 milliGRAM(s) Oral two times a day  levothyroxine 88 MICROGram(s) Oral daily  melatonin 5 milliGRAM(s) Oral at bedtime  oxybutynin 10 milliGRAM(s) Oral daily  pantoprazole    Tablet 40 milliGRAM(s) Oral before breakfast        LABS:	 	                        13.5   17.87 )-----------( 592      ( 06 Sep 2023 10:25 )             41.4     09-06    136  |  105  |  41<H>  ----------------------------<  122<H>  4.8   |  18<L>  |  1.26    Ca    8.4      06 Sep 2023 07:30  Phos  3.7     09-05        Lipid Profile: Cholesterol 87  LDL --  HDL 55  TG 74  Ldl calc 17    TSH: Thyroid Stimulating Hormone, Serum: 0.84 uU/mL (09-02 @ 06:58)      	      Urinalysis (09.06.23 @ 10:40)   [id number] Qualitative, Urine: Negative mg/dL  Blood, Urine: Small  pH Urine: 5.5  Color: Dark Yellow  Urine Appearance: Cloudy  Bilirubin: Moderate  Ketone - Urine: 15 mg/dL  Specific Gravity: 1.033  Protein, Urine: 100 mg/dL  Urobilinogen: 1.0 mg/dL  Nitrite: Positive  Leukocyte Esterase Concentration: ModerateUrine Microscopic-Add On (NC) (09.06.23 @ 10:40)   Red Blood Cell - Urine: 5 /HPF  White Blood Cell - Urine: 20 /HPF  Bacteria: Many /HPF  Squamous Epithelial Cells: many   Date of Service 09-06-23 @ 12:10    CHIEF COMPLAINT:Patient is a 78y old  Female who presents with a chief complaint of Enteritis.Pt appears comfortable.    	  REVIEW OF SYSTEMS:  CONSTITUTIONAL: No fever, weight loss, or fatigue  EYES: No eye pain, visual disturbances, or discharge  ENT:  No difficulty hearing, tinnitus, vertigo; No sinus or throat pain  NECK: No pain or stiffness  RESPIRATORY: No cough, wheezing, chills or hemoptysis; No Shortness of Breath  CARDIOVASCULAR: No chest pain, palpitations, passing out, dizziness, or leg swelling  GASTROINTESTINAL: No abdominal or epigastric pain. No nausea, vomiting, or hematemesis; No diarrhea or constipation. No melena or hematochezia.  GENITOURINARY: No dysuria, frequency, hematuria, or incontinence  NEUROLOGICAL: No headaches, memory loss, loss of strength, numbness, or tremors  SKIN: No itching, burning, rashes, or lesions   LYMPH Nodes: No enlarged glands  ENDOCRINE: No heat or cold intolerance; No hair loss  MUSCULOSKELETAL: No joint pain or swelling; No muscle, back, or extremity pain  PSYCHIATRIC: No depression, anxiety, mood swings, or difficulty sleeping  HEME/LYMPH: No easy bruising, or bleeding gums  ALLERGY AND IMMUNOLOGIC: No hives or eczema	        PHYSICAL EXAM:  T(C): 36.6 (09-06-23 @ 05:20), Max: 36.7 (09-05-23 @ 20:26)  HR: 93 (09-06-23 @ 05:20) (78 - 94)  BP: 109/76 (09-06-23 @ 05:20) (99/64 - 112/82)  RR: 17 (09-06-23 @ 05:20) (17 - 17)  SpO2: 96% (09-06-23 @ 05:20) (96% - 99%)  Wt(kg): --  I&O's Summary      Appearance: Normal	  HEENT:   Normal oral mucosa, PERRL, EOMI	  Lymphatic: No lymphadenopathy  Cardiovascular: Normal S1 S2, No JVD, No murmurs, No edema  Respiratory: Lungs clear to auscultation	  Psychiatry: A & O x 3, Mood & affect appropriate  Gastrointestinal:  Soft, Non-tender, + BS	  Skin: No rashes, No ecchymoses, No cyanosis	  Neurologic: Non-focal  Extremities: Normal range of motion, No clubbing, cyanosis or edema  Vascular: Peripheral pulses palpable 2+ bilaterally    MEDICATIONS  (STANDING):  apixaban 5 milliGRAM(s) Oral every 12 hours  aspirin  chewable 81 milliGRAM(s) Oral daily  atorvastatin 20 milliGRAM(s) Oral at bedtime  gabapentin 100 milliGRAM(s) Oral every 8 hours  levETIRAcetam 750 milliGRAM(s) Oral two times a day  levothyroxine 88 MICROGram(s) Oral daily  melatonin 5 milliGRAM(s) Oral at bedtime  oxybutynin 10 milliGRAM(s) Oral daily  pantoprazole    Tablet 40 milliGRAM(s) Oral before breakfast        LABS:	 	                        13.5   17.87 )-----------( 592      ( 06 Sep 2023 10:25 )             41.4     09-06    136  |  105  |  41<H>  ----------------------------<  122<H>  4.8   |  18<L>  |  1.26    Ca    8.4      06 Sep 2023 07:30  Phos  3.7     09-05        Lipid Profile: Cholesterol 87  LDL --  HDL 55  TG 74  Ldl calc 17    TSH: Thyroid Stimulating Hormone, Serum: 0.84 uU/mL (09-02 @ 06:58)      	      Urinalysis (09.06.23 @ 10:40)   [id number] Qualitative, Urine: Negative mg/dL  Blood, Urine: Small  pH Urine: 5.5  Color: Dark Yellow  Urine Appearance: Cloudy  Bilirubin: Moderate  Ketone - Urine: 15 mg/dL  Specific Gravity: 1.033  Protein, Urine: 100 mg/dL  Urobilinogen: 1.0 mg/dL  Nitrite: Positive  Leukocyte Esterase Concentration: ModerateUrine Microscopic-Add On (NC) (09.06.23 @ 10:40)   Red Blood Cell - Urine: 5 /HPF  White Blood Cell - Urine: 20 /HPF  Bacteria: Many /HPF  Squamous Epithelial Cells: many   Date of Service 09-06-23 @ 12:10    CHIEF COMPLAINT:Patient is a 78y old  Female who presents with a chief complaint of Enteritis.Pt appears comfortable.    	  REVIEW OF SYSTEMS:  CONSTITUTIONAL: No fever, weight loss, or fatigue  EYES: No eye pain, visual disturbances, or discharge  ENT:  No difficulty hearing, tinnitus, vertigo; No sinus or throat pain  NECK: No pain or stiffness  RESPIRATORY: No cough, wheezing, chills or hemoptysis; No Shortness of Breath  CARDIOVASCULAR: No chest pain, palpitations, passing out, dizziness, or leg swelling  GASTROINTESTINAL: No abdominal or epigastric pain. No nausea, vomiting, or hematemesis; No diarrhea or constipation. No melena or hematochezia.  GENITOURINARY: No dysuria, frequency, hematuria, or incontinence  NEUROLOGICAL: No headaches, memory loss, loss of strength, numbness, or tremors  SKIN: No itching, burning, rashes, or lesions   LYMPH Nodes: No enlarged glands  ENDOCRINE: No heat or cold intolerance; No hair loss  MUSCULOSKELETAL: No joint pain or swelling; No muscle, back, or extremity pain  PSYCHIATRIC: No depression, anxiety, mood swings, or difficulty sleeping  HEME/LYMPH: No easy bruising, or bleeding gums  ALLERGY AND IMMUNOLOGIC: No hives or eczema	        PHYSICAL EXAM:  T(C): 36.6 (09-06-23 @ 05:20), Max: 36.7 (09-05-23 @ 20:26)  HR: 93 (09-06-23 @ 05:20) (78 - 94)  BP: 109/76 (09-06-23 @ 05:20) (99/64 - 112/82)  RR: 17 (09-06-23 @ 05:20) (17 - 17)  SpO2: 96% (09-06-23 @ 05:20) (96% - 99%)  Wt(kg): --  I&O's Summary      Appearance: Normal	  HEENT:   Normal oral mucosa, PERRL, EOMI	  Lymphatic: No lymphadenopathy  Cardiovascular: Normal S1 S2, No JVD, No murmurs, No edema  Respiratory: Lungs clear to auscultation	  Psychiatry: A & O x 3, Mood & affect appropriate  Gastrointestinal:  Soft, Non-tender, + BS	  Skin: No rashes, No ecchymoses, No cyanosis	  Neurologic: Non-focal  Extremities: Normal range of motion, No clubbing, cyanosis or edema  Vascular: Peripheral pulses palpable 2+ bilaterally    MEDICATIONS  (STANDING):  apixaban 5 milliGRAM(s) Oral every 12 hours  aspirin  chewable 81 milliGRAM(s) Oral daily  atorvastatin 20 milliGRAM(s) Oral at bedtime  gabapentin 100 milliGRAM(s) Oral every 8 hours  levETIRAcetam 750 milliGRAM(s) Oral two times a day  levothyroxine 88 MICROGram(s) Oral daily  melatonin 5 milliGRAM(s) Oral at bedtime  oxybutynin 10 milliGRAM(s) Oral daily  pantoprazole    Tablet 40 milliGRAM(s) Oral before breakfast        LABS:	 	                        13.5   17.87 )-----------( 592      ( 06 Sep 2023 10:25 )             41.4     09-06    136  |  105  |  41<H>  ----------------------------<  122<H>  4.8   |  18<L>  |  1.26    Ca    8.4      06 Sep 2023 07:30  Phos  3.7     09-05        Lipid Profile: Cholesterol 87  LDL --  HDL 55  TG 74  Ldl calc 17    TSH: Thyroid Stimulating Hormone, Serum: 0.84 uU/mL (09-02 @ 06:58)      	      Urinalysis (09.06.23 @ 10:40)   [id number] Qualitative, Urine: Negative mg/dL  Blood, Urine: Small  pH Urine: 5.5  Color: Dark Yellow  Urine Appearance: Cloudy  Bilirubin: Moderate  Ketone - Urine: 15 mg/dL  Specific Gravity: 1.033  Protein, Urine: 100 mg/dL  Urobilinogen: 1.0 mg/dL  Nitrite: Positive  Leukocyte Esterase Concentration: ModerateUrine Microscopic-Add On (NC) (09.06.23 @ 10:40)   Red Blood Cell - Urine: 5 /HPF  White Blood Cell - Urine: 20 /HPF  Bacteria: Many /HPF  Squamous Epithelial Cells: many

## 2023-09-06 NOTE — PROGRESS NOTE ADULT - SUBJECTIVE AND OBJECTIVE BOX
NP Note discussed with  Primary Attending    Patient is a 78y old  Female who presents with a chief complaint of Enteritis (06 Sep 2023 12:10)      INTERVAL HPI/OVERNIGHT EVENTS: no new complaints    MEDICATIONS  (STANDING):  apixaban 5 milliGRAM(s) Oral every 12 hours  aspirin  chewable 81 milliGRAM(s) Oral daily  atorvastatin 20 milliGRAM(s) Oral at bedtime  gabapentin 100 milliGRAM(s) Oral every 8 hours  levETIRAcetam 750 milliGRAM(s) Oral two times a day  levoFLOXacin IVPB      levothyroxine 88 MICROGram(s) Oral daily  melatonin 5 milliGRAM(s) Oral at bedtime  oxybutynin 10 milliGRAM(s) Oral daily  pantoprazole    Tablet 40 milliGRAM(s) Oral before breakfast    MEDICATIONS  (PRN):  acetaminophen     Tablet .. 650 milliGRAM(s) Oral every 6 hours PRN Temp greater or equal to 38C (100.4F), Mild Pain (1 - 3)  albuterol    90 MICROgram(s) HFA Inhaler 2 Puff(s) Inhalation every 6 hours PRN Shortness of Breath and/or Wheezing  ALPRAZolam 0.25 milliGRAM(s) Oral two times a day PRN Anxiety/insomnia  metoclopramide Injectable 10 milliGRAM(s) IV Push every 8 hours PRN nausea, vomiting  ondansetron Injectable 4 milliGRAM(s) IV Push every 6 hours PRN Nausea and/or Vomiting  simethicone 80 milliGRAM(s) Chew four times a day PRN Gas      __________________________________________________  REVIEW OF SYSTEMS:    CONSTITUTIONAL: No fever,   EYES: no acute visual disturbances  NECK: No pain or stiffness  RESPIRATORY: No cough; No shortness of breath  CARDIOVASCULAR: No chest pain, no palpitations  GASTROINTESTINAL: No pain. No nausea or vomiting; No diarrhea   NEUROLOGICAL: No headache or numbness, no tremors  MUSCULOSKELETAL: No joint pain, no muscle pain  GENITOURINARY: no dysuria, no frequency, no hesitancy  PSYCHIATRY: no depression , no anxiety  ALL OTHER  ROS negative        Vital Signs Last 24 Hrs  T(C): 36.4 (06 Sep 2023 13:57), Max: 36.7 (05 Sep 2023 20:26)  T(F): 97.6 (06 Sep 2023 13:57), Max: 98 (05 Sep 2023 20:26)  HR: 99 (06 Sep 2023 13:57) (93 - 99)  BP: 93/64 (06 Sep 2023 13:57) (93/64 - 112/82)  BP(mean): --  RR: 20 (06 Sep 2023 13:57) (17 - 20)  SpO2: 95% (06 Sep 2023 13:57) (95% - 98%)    Parameters below as of 06 Sep 2023 13:57  Patient On (Oxygen Delivery Method): room air        ________________________________________________  PHYSICAL EXAM:  GENERAL: NAD  HEENT: Normocephalic;  conjunctivae and sclerae clear; moist mucous membranes;   NECK : supple  CHEST/LUNG: Clear to auscultation bilaterally with good air entry   HEART: S1 S2  regular; no murmurs, gallops or rubs  ABDOMEN: Soft, Nontender, Nondistended; Bowel sounds present  EXTREMITIES: no cyanosis; no edema; no calf tenderness  SKIN: warm and dry; no rash  NERVOUS SYSTEM:  Awake and alert; Oriented  to place, person and time ; no new deficits    _________________________________________________  LABS:                        13.5   17.87 )-----------( 592      ( 06 Sep 2023 10:25 )             41.4     09-06    136  |  105  |  41<H>  ----------------------------<  122<H>  4.8   |  18<L>  |  1.26    Ca    8.4      06 Sep 2023 07:30  Phos  3.7     09-05        Urinalysis Basic - ( 06 Sep 2023 10:40 )    Color: Dark Yellow / Appearance: Cloudy / S.033 / pH: x  Gluc: x / Ketone: 15 mg/dL  / Bili: Moderate / Urobili: 1.0 mg/dL   Blood: x / Protein: 100 mg/dL / Nitrite: Positive   Leuk Esterase: Moderate / RBC: 5 /HPF / WBC 20 /HPF   Sq Epi: x / Non Sq Epi: x / Bacteria: Many /HPF      CAPILLARY BLOOD GLUCOSE            RADIOLOGY & ADDITIONAL TESTS:    Imaging Personally Reviewed:  YES/NO    Consultant(s) Notes Reviewed:   YES/ No    Care Discussed with Consultants :     Plan of care was discussed with patient and /or primary care giver; all questions and concerns were addressed and care was aligned with patient's wishes.     NP Note discussed with  Primary Attending    Patient is a 78y old  Female who presents with a chief complaint of Enteritis (06 Sep 2023 12:10).  Having several episodes watery diarrhea, denies pain, N/V.      INTERVAL HPI/OVERNIGHT EVENTS: no new complaints    MEDICATIONS  (STANDING):  apixaban 5 milliGRAM(s) Oral every 12 hours  aspirin  chewable 81 milliGRAM(s) Oral daily  atorvastatin 20 milliGRAM(s) Oral at bedtime  gabapentin 100 milliGRAM(s) Oral every 8 hours  levETIRAcetam 750 milliGRAM(s) Oral two times a day  levoFLOXacin IVPB      levothyroxine 88 MICROGram(s) Oral daily  melatonin 5 milliGRAM(s) Oral at bedtime  oxybutynin 10 milliGRAM(s) Oral daily  pantoprazole    Tablet 40 milliGRAM(s) Oral before breakfast    MEDICATIONS  (PRN):  acetaminophen     Tablet .. 650 milliGRAM(s) Oral every 6 hours PRN Temp greater or equal to 38C (100.4F), Mild Pain (1 - 3)  albuterol    90 MICROgram(s) HFA Inhaler 2 Puff(s) Inhalation every 6 hours PRN Shortness of Breath and/or Wheezing  ALPRAZolam 0.25 milliGRAM(s) Oral two times a day PRN Anxiety/insomnia  metoclopramide Injectable 10 milliGRAM(s) IV Push every 8 hours PRN nausea, vomiting  ondansetron Injectable 4 milliGRAM(s) IV Push every 6 hours PRN Nausea and/or Vomiting  simethicone 80 milliGRAM(s) Chew four times a day PRN Gas      __________________________________________________  REVIEW OF SYSTEMS:    CONSTITUTIONAL: No fever,   EYES: no acute visual disturbances  NECK: No pain or stiffness  RESPIRATORY: No cough; No shortness of breath  CARDIOVASCULAR: No chest pain, no palpitations  GASTROINTESTINAL: No pain. No nausea or vomiting; No diarrhea   NEUROLOGICAL: No headache or numbness, no tremors  MUSCULOSKELETAL: No joint pain, no muscle pain  GENITOURINARY: no dysuria, no frequency, no hesitancy  PSYCHIATRY: no depression , no anxiety  ALL OTHER  ROS negative        Vital Signs Last 24 Hrs  T(C): 36.4 (06 Sep 2023 13:57), Max: 36.7 (05 Sep 2023 20:26)  T(F): 97.6 (06 Sep 2023 13:57), Max: 98 (05 Sep 2023 20:26)  HR: 99 (06 Sep 2023 13:57) (93 - 99)  BP: 93/64 (06 Sep 2023 13:57) (93/64 - 112/82)  BP(mean): --  RR: 20 (06 Sep 2023 13:57) (17 - 20)  SpO2: 95% (06 Sep 2023 13:57) (95% - 98%)    Parameters below as of 06 Sep 2023 13:57  Patient On (Oxygen Delivery Method): room air        ________________________________________________  PHYSICAL EXAM:  Obese  GENERAL: NAD  HEENT: Normocephalic;  conjunctivae and sclerae clear; moist mucous membranes;   NECK : supple  CHEST/LUNG: Clear to auscultation bilaterally with good air entry   HEART: S1 S2  regular; no murmurs, gallops or rubs  ABDOMEN: Soft, Nontender, Nondistended; Bowel sounds present  EXTREMITIES: no cyanosis; no edema; no calf tenderness  SKIN: warm and dry; no rash  NERVOUS SYSTEM:  Awake and alert; Oriented  to place, person and time ; no new deficits    _________________________________________________  LABS:                        13.5   17.87 )-----------( 592      ( 06 Sep 2023 10:25 )             41.4     09-06    136  |  105  |  41<H>  ----------------------------<  122<H>  4.8   |  18<L>  |  1.26    Ca    8.4      06 Sep 2023 07:30  Phos  3.7     09-05      Urinalysis Basic - ( 06 Sep 2023 10:40 )    Color: Dark Yellow / Appearance: Cloudy / S.033 / pH: x  Gluc: x / Ketone: 15 mg/dL  / Bili: Moderate / Urobili: 1.0 mg/dL   Blood: x / Protein: 100 mg/dL / Nitrite: Positive   Leuk Esterase: Moderate / RBC: 5 /HPF / WBC 20 /HPF   Sq Epi: x / Non Sq Epi: x / Bacteria: Many /HPF      CAPILLARY BLOOD GLUCOSE    RADIOLOGY & ADDITIONAL TESTS:      < from: Xray Abdomen 1 View PORTABLE -Urgent (Xray Abdomen 1 View PORTABLE -Urgent .) (23 @ 11:16) >    ACC: [id number] EXAM:  XR ABDOMEN PORTABLE URGENT 1V   ORDERED BY: DEEPAK MCCAULEY     PROCEDURE DATE:  2023          INTERPRETATION:  HISTORY:  Nausea and vomiting    TECHNIQUE:  Supine views of the abdomen and pelvis were obtained.   Comparison is made to prior study dated 2023    FINDINGS:  Markedly dilated loops of small bowel are again seen   throughout the abdomen. No air is seen distally in the colon. Calcified   fibroids are noted in the pelvis. Subcutaneous emphysema is noted in the   left abdominal wall.    IMPRESSION:  Markedly dilated loops of small bowel are again identified.    --- End of Report ---    < end of copied text >    < from: Xray Abdomen 1 View (23 @ 10:24) >    ACC: [id number] EXAM:  XR ABDOMEN 1 VIEW   ORDERED BY: SANTI SANTOS     PROCEDURE DATE:  2023          INTERPRETATION:  Follow-up.    AP supine abdomen. Comparison to CT of 2023.    IMPRESSION: Markedly distended small bowel loops throughout the abdomen   and pelvis is similar to recent CT. The CT demonstrates a status post   colectomy and ileorectal anastomosis. Evaluation for free air limited on   this supine projection Subcutaneous emphysema noted laterally   corresponding to recent CT abnormality.    < end of copied text >      Imaging Personally Reviewed:  YES/NO    Consultant(s) Notes Reviewed:   YES/ No    Care Discussed with Consultants :     Plan of care was discussed with patient and /or primary care giver; all questions and concerns were addressed and care was aligned with patient's wishes.     NP Note discussed with  Primary Attending    Patient is a 78y old  Female who presents with a chief complaint of Enteritis (06 Sep 2023 12:10).  Having several episodes watery diarrhea, denies pain, N/V.      INTERVAL HPI/OVERNIGHT EVENTS: no new complaints    MEDICATIONS  (STANDING):  apixaban 5 milliGRAM(s) Oral every 12 hours  aspirin  chewable 81 milliGRAM(s) Oral daily  atorvastatin 20 milliGRAM(s) Oral at bedtime  gabapentin 100 milliGRAM(s) Oral every 8 hours  levETIRAcetam 750 milliGRAM(s) Oral two times a day  levoFLOXacin IVPB      levothyroxine 88 MICROGram(s) Oral daily  melatonin 5 milliGRAM(s) Oral at bedtime  oxybutynin 10 milliGRAM(s) Oral daily  pantoprazole    Tablet 40 milliGRAM(s) Oral before breakfast    MEDICATIONS  (PRN):  acetaminophen     Tablet .. 650 milliGRAM(s) Oral every 6 hours PRN Temp greater or equal to 38C (100.4F), Mild Pain (1 - 3)  albuterol    90 MICROgram(s) HFA Inhaler 2 Puff(s) Inhalation every 6 hours PRN Shortness of Breath and/or Wheezing  ALPRAZolam 0.25 milliGRAM(s) Oral two times a day PRN Anxiety/insomnia  metoclopramide Injectable 10 milliGRAM(s) IV Push every 8 hours PRN nausea, vomiting  ondansetron Injectable 4 milliGRAM(s) IV Push every 6 hours PRN Nausea and/or Vomiting  simethicone 80 milliGRAM(s) Chew four times a day PRN Gas      __________________________________________________  REVIEW OF SYSTEMS:    CONSTITUTIONAL: No fever,   EYES: no acute visual disturbances  NECK: No pain or stiffness  RESPIRATORY: No cough; No shortness of breath  CARDIOVASCULAR: No chest pain, no palpitations  GASTROINTESTINAL: No pain. No nausea or vomiting; No diarrhea   NEUROLOGICAL: No headache or numbness, no tremors  MUSCULOSKELETAL: No joint pain, no muscle pain  GENITOURINARY: no dysuria, no frequency, no hesitancy  PSYCHIATRY: no depression , no anxiety  ALL OTHER  ROS negative        Vital Signs Last 24 Hrs  T(C): 36.4 (06 Sep 2023 13:57), Max: 36.7 (05 Sep 2023 20:26)  T(F): 97.6 (06 Sep 2023 13:57), Max: 98 (05 Sep 2023 20:26)  HR: 99 (06 Sep 2023 13:57) (93 - 99)  BP: 93/64 (06 Sep 2023 13:57) (93/64 - 112/82)  BP(mean): --  RR: 20 (06 Sep 2023 13:57) (17 - 20)  SpO2: 95% (06 Sep 2023 13:57) (95% - 98%)    Parameters below as of 06 Sep 2023 13:57  Patient On (Oxygen Delivery Method): room air        ________________________________________________  PHYSICAL EXAM:  Obese  GENERAL: NAD  HEENT: Normocephalic;  conjunctivae and sclerae clear; moist mucous membranes;   NECK : supple  CHEST/LUNG: Clear to auscultation bilaterally with good air entry   HEART: S1 S2  regular; no murmurs, gallops or rubs  ABDOMEN: Soft, Nontender, Nondistended; Bowel sounds present  EXTREMITIES: no cyanosis; no edema; no calf tenderness  SKIN: warm and dry; no rash  NERVOUS SYSTEM:  Awake and alert; Oriented  to place, person and time ; no new deficits    _________________________________________________  LABS:                        13.5   17.87 )-----------( 592      ( 06 Sep 2023 10:25 )             41.4     09-06    136  |  105  |  41<H>  ----------------------------<  122<H>  4.8   |  18<L>  |  1.26    Ca    8.4      06 Sep 2023 07:30  Phos  3.7     09-05      Urinalysis Basic - ( 06 Sep 2023 10:40 )    Color: Dark Yellow / Appearance: Cloudy / S.033 / pH: x  Gluc: x / Ketone: 15 mg/dL  / Bili: Moderate / Urobili: 1.0 mg/dL   Blood: x / Protein: 100 mg/dL / Nitrite: Positive   Leuk Esterase: Moderate / RBC: 5 /HPF / WBC 20 /HPF   Sq Epi: x / Non Sq Epi: x / Bacteria: Many /HPF      CAPILLARY BLOOD GLUCOSE    RADIOLOGY & ADDITIONAL TESTS:      < from: Xray Abdomen 1 View PORTABLE -Urgent (Xray Abdomen 1 View PORTABLE -Urgent .) (23 @ 11:16) >    ACC: [id number] EXAM:  XR ABDOMEN PORTABLE URGENT 1V   ORDERED BY: DEEPAK MCCAULEY     PROCEDURE DATE:  2023          INTERPRETATION:  HISTORY:  Nausea and vomiting    TECHNIQUE:  Supine views of the abdomen and pelvis were obtained.   Comparison is made to prior study dated 2023    FINDINGS:  Markedly dilated loops of small bowel are again seen   throughout the abdomen. No air is seen distally in the colon. Calcified   fibroids are noted in the pelvis. Subcutaneous emphysema is noted in the   left abdominal wall.    IMPRESSION:  Markedly dilated loops of small bowel are again identified.    --- End of Report ---    < end of copied text >    < from: Xray Abdomen 1 View (23 @ 10:24) >    ACC: [id number] EXAM:  XR ABDOMEN 1 VIEW   ORDERED BY: SANTI SANTOS     PROCEDURE DATE:  2023          INTERPRETATION:  Follow-up.    AP supine abdomen. Comparison to CT of 2023.    IMPRESSION: Markedly distended small bowel loops throughout the abdomen   and pelvis is similar to recent CT. The CT demonstrates a status post   colectomy and ileorectal anastomosis. Evaluation for free air limited on   this supine projection Subcutaneous emphysema noted laterally   corresponding to recent CT abnormality.    < end of copied text >      Imaging Personally Reviewed:  YES/NO    Consultant(s) Notes Reviewed:   YES/ No    Care Discussed with Consultants :     Plan of care was discussed with patient and /or primary care giver; all questions and concerns were addressed and care was aligned with patient's wishes.     NP Note discussed with  Primary Attending    Patient is a 78y old  Female who presents with a chief complaint of Enteritis (06 Sep 2023 12:10).  Having several episodes watery diarrhea, denies pain, N/V.      INTERVAL HPI/OVERNIGHT EVENTS: no new complaints    MEDICATIONS  (STANDING):  apixaban 5 milliGRAM(s) Oral every 12 hours  aspirin  chewable 81 milliGRAM(s) Oral daily  atorvastatin 20 milliGRAM(s) Oral at bedtime  gabapentin 100 milliGRAM(s) Oral every 8 hours  levETIRAcetam 750 milliGRAM(s) Oral two times a day  levoFLOXacin IVPB      levothyroxine 88 MICROGram(s) Oral daily  melatonin 5 milliGRAM(s) Oral at bedtime  oxybutynin 10 milliGRAM(s) Oral daily  pantoprazole    Tablet 40 milliGRAM(s) Oral before breakfast    MEDICATIONS  (PRN):  acetaminophen     Tablet .. 650 milliGRAM(s) Oral every 6 hours PRN Temp greater or equal to 38C (100.4F), Mild Pain (1 - 3)  albuterol    90 MICROgram(s) HFA Inhaler 2 Puff(s) Inhalation every 6 hours PRN Shortness of Breath and/or Wheezing  ALPRAZolam 0.25 milliGRAM(s) Oral two times a day PRN Anxiety/insomnia  metoclopramide Injectable 10 milliGRAM(s) IV Push every 8 hours PRN nausea, vomiting  ondansetron Injectable 4 milliGRAM(s) IV Push every 6 hours PRN Nausea and/or Vomiting  simethicone 80 milliGRAM(s) Chew four times a day PRN Gas      __________________________________________________  REVIEW OF SYSTEMS:    CONSTITUTIONAL: No fever,   EYES: no acute visual disturbances  NECK: No pain or stiffness  RESPIRATORY: No cough; No shortness of breath  CARDIOVASCULAR: No chest pain, no palpitations  GASTROINTESTINAL: No pain. No nausea or vomiting; No diarrhea   NEUROLOGICAL: No headache or numbness, no tremors  MUSCULOSKELETAL: No joint pain, no muscle pain  GENITOURINARY: no dysuria, no frequency, no hesitancy  PSYCHIATRY: no depression , no anxiety  ALL OTHER  ROS negative        Vital Signs Last 24 Hrs  T(C): 36.4 (06 Sep 2023 13:57), Max: 36.7 (05 Sep 2023 20:26)  T(F): 97.6 (06 Sep 2023 13:57), Max: 98 (05 Sep 2023 20:26)  HR: 99 (06 Sep 2023 13:57) (93 - 99)  BP: 93/64 (06 Sep 2023 13:57) (93/64 - 112/82)  BP(mean): --  RR: 20 (06 Sep 2023 13:57) (17 - 20)  SpO2: 95% (06 Sep 2023 13:57) (95% - 98%)    Parameters below as of 06 Sep 2023 13:57  Patient On (Oxygen Delivery Method): room air        ________________________________________________  PHYSICAL EXAM:  Obese  GENERAL: NAD  HEENT: Normocephalic;  conjunctivae and sclerae clear; moist mucous membranes;   NECK : supple  CHEST/LUNG: Clear to auscultation bilaterally with good air entry   HEART: S1 S2  regular; no murmurs, gallops or rubs  ABDOMEN: Soft, Nontender, Nondistended; Bowel sounds present  EXTREMITIES: no cyanosis; no edema; no calf tenderness  SKIN: warm and dry; no rash  NERVOUS SYSTEM:  Awake and alert; Oriented  to place, person and time ; no new deficits    _________________________________________________  LABS:                        13.5   17.87 )-----------( 592      ( 06 Sep 2023 10:25 )             41.4     09-06    136  |  105  |  41<H>  ----------------------------<  122<H>  4.8   |  18<L>  |  1.26    Ca    8.4      06 Sep 2023 07:30  Phos  3.7     09-05      Urinalysis Basic - ( 06 Sep 2023 10:40 )    Color: Dark Yellow / Appearance: Cloudy / S.033 / pH: x  Gluc: x / Ketone: 15 mg/dL  / Bili: Moderate / Urobili: 1.0 mg/dL   Blood: x / Protein: 100 mg/dL / Nitrite: Positive   Leuk Esterase: Moderate / RBC: 5 /HPF / WBC 20 /HPF   Sq Epi: x / Non Sq Epi: x / Bacteria: Many /HPF      CAPILLARY BLOOD GLUCOSE    RADIOLOGY & ADDITIONAL TESTS:      < from: Xray Abdomen 1 View PORTABLE -Urgent (Xray Abdomen 1 View PORTABLE -Urgent .) (23 @ 11:16) >    ACC: [id number] EXAM:  XR ABDOMEN PORTABLE URGENT 1V   ORDERED BY: DEEPAK MCCAULEY     PROCEDURE DATE:  2023          INTERPRETATION:  HISTORY:  Nausea and vomiting    TECHNIQUE:  Supine views of the abdomen and pelvis were obtained.   Comparison is made to prior study dated 2023    FINDINGS:  Markedly dilated loops of small bowel are again seen   throughout the abdomen. No air is seen distally in the colon. Calcified   fibroids are noted in the pelvis. Subcutaneous emphysema is noted in the   left abdominal wall.    IMPRESSION:  Markedly dilated loops of small bowel are again identified.    --- End of Report ---    < end of copied text >    < from: Xray Abdomen 1 View (23 @ 10:24) >    ACC: [id number] EXAM:  XR ABDOMEN 1 VIEW   ORDERED BY: SANTI SANTOS     PROCEDURE DATE:  2023          INTERPRETATION:  Follow-up.    AP supine abdomen. Comparison to CT of 2023.    IMPRESSION: Markedly distended small bowel loops throughout the abdomen   and pelvis is similar to recent CT. The CT demonstrates a status post   colectomy and ileorectal anastomosis. Evaluation for free air limited on   this supine projection Subcutaneous emphysema noted laterally   corresponding to recent CT abnormality.    < end of copied text >      Imaging Personally Reviewed:  YES/NO    Consultant(s) Notes Reviewed:   YES/ No    Care Discussed with Consultants :     Plan of care was discussed with patient and /or primary care giver; all questions and concerns were addressed and care was aligned with patient's wishes.

## 2023-09-06 NOTE — PROGRESS NOTE ADULT - PROBLEM SELECTOR PLAN 3
WBC 9.21-->11.54-->12.8-->14.31  -Afebrile   -Trend WBC  -Pt. may need to f/u with [facility] surgical team WBC 9.21-->11.54-->12.8-->14.31  -Afebrile   -Trend WBC  -Pt. may need to f/u with [facility] surgical team WBC 9.21-->11.54-->12.8-->14.31  -Afebrile   -Trend WBC  -Pt. may need to f/u with [facility] surgical team WBC 9.21-->11.54-->12.8-->14.31-->17.87  -Afebrile   -Trend WBC  -Pt. may need to f/u with [facility] surgical team WBC 9.21-->11.54-->12.8-->14.31-->17.87  -Afebrile   -Trend WBC  -Pt. may need to f/u with [facility] surgical team WBC 9.21-->11.54-->12.8-->14.31-->17.87  -Afebrile   -Trend WBC  -Pt. may need to f/u with [facility] surgical team

## 2023-09-06 NOTE — PROGRESS NOTE ADULT - PROBLEM SELECTOR PLAN 7
Had a long conversation with the patient's daughter Linh and his wife.  They are concerned the patient's been here for multiple days.  Went over the plan with the antiseizure medications explained to them that we started 2 new ones Topamax and Depakote yesterday.  And that the seizures have decreased on the EEG.  However the patient is very lethargic today most likely due to the medications.  Explained to him the plan is still to get a MRI done sometime this weekend.  They expressed their concerns about reports and updates being made to his hematologist in [place].    Jass Velazquez   C/w atorvastatin.

## 2023-09-06 NOTE — PROGRESS NOTE ADULT - PROBLEM SELECTOR PLAN 4
P/w weakness, lethargy, dizziness Likely in setting of chronic diarrhea since colectomy VS sepsis vs poor oral intake.   -SBP at facility in low 80s.   -s/ IVF hydration  -F/u orthostatic vitals.   -Blood cultures negative  -CXR - negative. P/w weakness, lethargy, dizziness Likely in setting of chronic diarrhea since colectomy VS sepsis vs poor oral intake.   -SBP at facility in low 80s.   -s/p IVF hydration  -F/u orthostatic vitals.   -Blood cultures negative  -CXR - negative.

## 2023-09-06 NOTE — PROGRESS NOTE ADULT - PROBLEM SELECTOR PLAN 2
UA negative  -Urine Culture 9/1 grew Enterobacter cloacae & Aerococcus species  -No Abx indicated at this time (likely colonization per ID)  -f/u repeat UA UA negative  -Urine Culture 9/1 grew Enterobacter cloacae & Aerococcus species  -No Abx indicated at this time (likely colonization per ID)  -Repeat UA 9/6 positive  -Started Levaquin IV

## 2023-09-06 NOTE — PROGRESS NOTE ADULT - SUBJECTIVE AND OBJECTIVE BOX
[   ] ICU                                          [   ] CCU                                      [ X  ] Medical Floor      Patient is a 78 year old female with persistent diarrhea. GI consulted to evaluate.         Patient is a 78 year old female with past medical history significant for transverse colon neoplasm s/p colectomy in [facility] in early August 2023, HTN, HLD, CAD s/p LHC with TALI to RCA, Atrial fibrillation s/p watchman implant (3/23), hypothyroidism, PUJA, and TIA presented to the emergency room with 2 days history of lightheaded and feels lethargic. Patient reports when her blood pressure was checked in the facility it was in the low 80s. Patient reports that since her surgery earlier in August she is not eating much at all. She reports that since the surgery she has had diarrhea. Patient reports she used to have 5-6 episodes of loose watery bowel movements every day and now have improved to 2-3 bowel movements daily. Patient reports due to fear of worsening diarrhea she has decreased diet and oral intake. Patient reports she only takes soup and anything heavy makes her diarrhea worse associated with intermittent, diffuse, non radiating, 3/10 intensity, crampy abdominal pain. . Patient denies nausea, vomiting, hematemesis, hematochezia, melena, fever, chills, chest pain, SOB, palpitation, cough, hematuria, dysuria, recent traveling.        Patient is comfortable. No new complaints reported, No abdominal pain, N/V, hematemesis, hematochezia, melena, fever, chills, chest pain, SOB, cough or diarrhea reported.      PAIN MANAGEMENT:  Pain Scale:                 3/10  Pain Location:  Diffuse crampy abdominal pain       PAST MEDICAL HISTORY    Atrial fibrillation    Hyperlipidemia    Hypertension    CAD (coronary artery disease)    Colon cancer    Hypothyroidism    Obstructive sleep apnea    Transient ischemic attack (TIA)             PAST SURGICAL HISTORY     Colectomy        Allergies    No Known Allergies    Intolerances  None       SOCIAL HISTORY  Advanced Directives:       [ X ] Full Code       [  ] DNR  Marital Status:         [  ] M      [ X ] S      [  ] D       [  ] W  Children:       [ X ] Yes      [  ] No  Occupation:        [  ] Employed       [ X ] Unemployed       [  ] Retired  Diet:       [ X ] Regular       [  ] PEG feeding          [  ] NG tube feeding  Drug Use:           [  ] Patient denied          [  ] Yes  Alcohol:           [ X ] No             [  ] Yes (socially)         [  ] Yes (chronic)  Tobacco:           [  ] Yes           [ X ] No      FAMILY HISTORY  [ X ] Heart Disease            [ X ] Diabetes             [ X ] HTN             [  ] Colon Cancer             [  ] Stomach Cancer              [  ] Pancreatic Cancer        VITALS  Vital Signs Last 24 Hrs  T(C): 36.6 (06 Sep 2023 05:20), Max: 36.7 (05 Sep 2023 20:26)  T(F): 97.8 (06 Sep 2023 05:20), Max: 98 (05 Sep 2023 20:26)  HR: 93 (06 Sep 2023 05:20) (78 - 94)  BP: 109/76 (06 Sep 2023 05:20) (99/64 - 112/82)     RR: 17 (06 Sep 2023 05:20) (17 - 17)  SpO2: 96% (06 Sep 2023 05:20) (96% - 99%)    Parameters below as of 06 Sep 2023 05:20  Patient On (Oxygen Delivery Method): room air       MEDICATIONS  (STANDING):  apixaban 5 milliGRAM(s) Oral every 12 hours  aspirin  chewable 81 milliGRAM(s) Oral daily  atorvastatin 20 milliGRAM(s) Oral at bedtime  gabapentin 100 milliGRAM(s) Oral every 8 hours  levETIRAcetam 750 milliGRAM(s) Oral two times a day  levothyroxine 88 MICROGram(s) Oral daily  melatonin 5 milliGRAM(s) Oral at bedtime  oxybutynin 10 milliGRAM(s) Oral daily  pantoprazole    Tablet 40 milliGRAM(s) Oral before breakfast    MEDICATIONS  (PRN):  acetaminophen     Tablet .. 650 milliGRAM(s) Oral every 6 hours PRN Temp greater or equal to 38C (100.4F), Mild Pain (1 - 3)  albuterol    90 MICROgram(s) HFA Inhaler 2 Puff(s) Inhalation every 6 hours PRN Shortness of Breath and/or Wheezing  ALPRAZolam 0.25 milliGRAM(s) Oral two times a day PRN Anxiety/insomnia  metoclopramide Injectable 10 milliGRAM(s) IV Push every 8 hours PRN nausea, vomiting  ondansetron Injectable 4 milliGRAM(s) IV Push every 6 hours PRN Nausea and/or Vomiting  simethicone 80 milliGRAM(s) Chew four times a day PRN Gas                            13.5   17.87 )-----------( 592      ( 06 Sep 2023 10:25 )             41.4       09-06    136  |  105  |  41<H>  ----------------------------<  122<H>  4.8   |  18<L>  |  1.26    Ca    8.4      06 Sep 2023 07:30  Phos  3.7     09-05

## 2023-09-06 NOTE — CHART NOTE - NSCHARTNOTEFT_GEN_A_CORE
EVENT: Received telephone call from RN that pt is c/o of dyspepsia    HPI: 79 y/o F with PMH transverse colon neoplasm s/p colectomy in [facility] 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 Patient reports that since yesterday she feels weak and tired, chronic diarrhea, hypotension, & now with ileus.    SUBJECTIVE: "I have dyspepsia again. I need some medication for it"    OBJECTIVE:  Vital Signs Last 24 Hrs  T(C): 36.6 (06 Sep 2023 20:42), Max: 36.6 (06 Sep 2023 05:20)  T(F): 97.8 (06 Sep 2023 20:42), Max: 97.8 (06 Sep 2023 05:20)  HR: 98 (06 Sep 2023 20:42) (93 - 99)  BP: 102/68 (06 Sep 2023 20:42) (93/64 - 109/76)  BP(mean): --  RR: 18 (06 Sep 2023 20:42) (17 - 20)  SpO2: 97% (06 Sep 2023 20:42) (95% - 97%)    Parameters below as of 06 Sep 2023 20:42  Patient On (Oxygen Delivery Method): room air      FOCUSED PHYSICAL EXAM:  Neuro: awake, alert, oriented x 3. No neuro deficit  Cardiovascular: Pulses +2 B/L in lower and upper extremities, HR regular, BP stable, No edema.  Respiratory: Respirations regular, unlabored, breath sounds clear B/L.   GI: Abdomen soft, non-tender, positive bowel sounds.  : no bladder distention noted. No complaints at this time.  Skin: Dry, intact, no bruising, no diaphoresis.    LABS:                        13.5   17.87 )-----------( 592      ( 06 Sep 2023 10:25 )             41.4     09-06    136  |  105  |  41<H>  ----------------------------<  122<H>  4.8   |  18<L>  |  1.26    Ca    8.4      06 Sep 2023 07:30  Phos  3.7     09-05        EKG:   IMAGING:    ASSESSMENT/PROBLEM: Dyspepsia      PLAN:   1. Maalox 30 cc, PO x 1 dose ordered  2. Monitor response to treatment  3. Cont present care/treatment  4. Supportive care EVENT: Received telephone call from RN that pt is c/o of dyspepsia    HPI: 79 y/o F with PMH transverse colon neoplasm s/p colectomy in [facility] 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 Patient reports that since yesterday she feels weak and tired, chronic diarrhea, hypotension, & now with ileus.    SUBJECTIVE: "I have dyspepsia again. I need some medication for it"    OBJECTIVE:  Vital Signs Last 24 Hrs  T(C): 36.6 (06 Sep 2023 20:42), Max: 36.6 (06 Sep 2023 05:20)  T(F): 97.8 (06 Sep 2023 20:42), Max: 97.8 (06 Sep 2023 05:20)  HR: 98 (06 Sep 2023 20:42) (93 - 99)  BP: 102/68 (06 Sep 2023 20:42) (93/64 - 109/76)  BP(mean): --  RR: 18 (06 Sep 2023 20:42) (17 - 20)  SpO2: 97% (06 Sep 2023 20:42) (95% - 97%)    Parameters below as of 06 Sep 2023 20:42  Patient On (Oxygen Delivery Method): room air      FOCUSED PHYSICAL EXAM:  Neuro: awake, alert, oriented x 3. No neuro deficit  Cardiovascular: Pulses +2 B/L in lower and upper extremities, HR regular, BP stable, No edema.  Respiratory: Respirations regular, unlabored, breath sounds clear B/L.   GI: Abdomen soft, non-tender, positive bowel sounds.  : no bladder distention noted. No complaints at this time.  Skin: Dry, intact, no bruising, no diaphoresis.    LABS:                        13.5   17.87 )-----------( 592      ( 06 Sep 2023 10:25 )             41.4     09-06    136  |  105  |  41<H>  ----------------------------<  122<H>  4.8   |  18<L>  |  1.26    Ca    8.4      06 Sep 2023 07:30  Phos  3.7     09-05        EKG:   IMAGING:    ASSESSMENT/PROBLEM: Dyspepsia      PLAN:   1. Maalox 30 cc, PO x 1 dose ordered  2. Monitor response to treatment  3. Cont present care/treatment  4. Supportive care EVENT: Received telephone call from RN that pt is c/o of dyspepsia    HPI: 77 y/o F with PMH transverse colon neoplasm s/p colectomy in [facility] 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 Patient reports that since yesterday she feels weak and tired, chronic diarrhea, hypotension, & now with ileus.    SUBJECTIVE: "I have dyspepsia again. I need some medication for it"    OBJECTIVE:  Vital Signs Last 24 Hrs  T(C): 36.6 (06 Sep 2023 20:42), Max: 36.6 (06 Sep 2023 05:20)  T(F): 97.8 (06 Sep 2023 20:42), Max: 97.8 (06 Sep 2023 05:20)  HR: 98 (06 Sep 2023 20:42) (93 - 99)  BP: 102/68 (06 Sep 2023 20:42) (93/64 - 109/76)  BP(mean): --  RR: 18 (06 Sep 2023 20:42) (17 - 20)  SpO2: 97% (06 Sep 2023 20:42) (95% - 97%)    Parameters below as of 06 Sep 2023 20:42  Patient On (Oxygen Delivery Method): room air      FOCUSED PHYSICAL EXAM:  Neuro: awake, alert, oriented x 3. No neuro deficit  Cardiovascular: Pulses +2 B/L in lower and upper extremities, HR regular, BP stable, No edema.  Respiratory: Respirations regular, unlabored, breath sounds clear B/L.   GI: Abdomen soft, non-tender, positive bowel sounds.  : no bladder distention noted. No complaints at this time.  Skin: Dry, intact, no bruising, no diaphoresis.    LABS:                        13.5   17.87 )-----------( 592      ( 06 Sep 2023 10:25 )             41.4     09-06    136  |  105  |  41<H>  ----------------------------<  122<H>  4.8   |  18<L>  |  1.26    Ca    8.4      06 Sep 2023 07:30  Phos  3.7     09-05        EKG:   IMAGING:    ASSESSMENT/PROBLEM: Dyspepsia      PLAN:   1. Maalox 30 cc, PO x 1 dose ordered  2. Monitor response to treatment  3. Cont present care/treatment  4. Supportive care

## 2023-09-06 NOTE — PROGRESS NOTE ADULT - PROBLEM SELECTOR PLAN 5
Colon cancer s/p colectomy.   Not on any chemo drugs as per NH paperwork. [Initial Visit] : an initial visit for

## 2023-09-07 DIAGNOSIS — N39.0 URINARY TRACT INFECTION, SITE NOT SPECIFIED: ICD-10-CM

## 2023-09-07 DIAGNOSIS — G40.909 EPILEPSY, UNSPECIFIED, NOT INTRACTABLE, WITHOUT STATUS EPILEPTICUS: ICD-10-CM

## 2023-09-07 DIAGNOSIS — N17.9 ACUTE KIDNEY FAILURE, UNSPECIFIED: ICD-10-CM

## 2023-09-07 DIAGNOSIS — Z02.9 ENCOUNTER FOR ADMINISTRATIVE EXAMINATIONS, UNSPECIFIED: ICD-10-CM

## 2023-09-07 LAB
ANION GAP SERPL CALC-SCNC: 12 MMOL/L — SIGNIFICANT CHANGE UP (ref 5–17)
BUN SERPL-MCNC: 71 MG/DL — HIGH (ref 7–18)
CALCIUM SERPL-MCNC: 7.9 MG/DL — LOW (ref 8.4–10.5)
CHLORIDE SERPL-SCNC: 103 MMOL/L — SIGNIFICANT CHANGE UP (ref 96–108)
CO2 SERPL-SCNC: 20 MMOL/L — LOW (ref 22–31)
CREAT SERPL-MCNC: 1.83 MG/DL — HIGH (ref 0.5–1.3)
EGFR: 28 ML/MIN/1.73M2 — LOW
GLUCOSE BLDC GLUCOMTR-MCNC: 136 MG/DL — HIGH (ref 70–99)
GLUCOSE BLDC GLUCOMTR-MCNC: 147 MG/DL — HIGH (ref 70–99)
GLUCOSE SERPL-MCNC: 114 MG/DL — HIGH (ref 70–99)
HCT VFR BLD CALC: 37.2 % — SIGNIFICANT CHANGE UP (ref 34.5–45)
HGB BLD-MCNC: 12.4 G/DL — SIGNIFICANT CHANGE UP (ref 11.5–15.5)
MCHC RBC-ENTMCNC: 27.6 PG — SIGNIFICANT CHANGE UP (ref 27–34)
MCHC RBC-ENTMCNC: 33.3 GM/DL — SIGNIFICANT CHANGE UP (ref 32–36)
MCV RBC AUTO: 82.9 FL — SIGNIFICANT CHANGE UP (ref 80–100)
NRBC # BLD: 0 /100 WBCS — SIGNIFICANT CHANGE UP (ref 0–0)
PLATELET # BLD AUTO: 514 K/UL — HIGH (ref 150–400)
POTASSIUM SERPL-MCNC: 4.3 MMOL/L — SIGNIFICANT CHANGE UP (ref 3.5–5.3)
POTASSIUM SERPL-SCNC: 4.3 MMOL/L — SIGNIFICANT CHANGE UP (ref 3.5–5.3)
RBC # BLD: 4.49 M/UL — SIGNIFICANT CHANGE UP (ref 3.8–5.2)
RBC # FLD: 15.7 % — HIGH (ref 10.3–14.5)
SODIUM SERPL-SCNC: 135 MMOL/L — SIGNIFICANT CHANGE UP (ref 135–145)
WBC # BLD: 16.65 K/UL — HIGH (ref 3.8–10.5)
WBC # FLD AUTO: 16.65 K/UL — HIGH (ref 3.8–10.5)

## 2023-09-07 PROCEDURE — 76775 US EXAM ABDO BACK WALL LIM: CPT | Mod: 26

## 2023-09-07 RX ORDER — SODIUM CHLORIDE 9 MG/ML
250 INJECTION INTRAMUSCULAR; INTRAVENOUS; SUBCUTANEOUS ONCE
Refills: 0 | Status: COMPLETED | OUTPATIENT
Start: 2023-09-07 | End: 2023-09-07

## 2023-09-07 RX ORDER — SODIUM CHLORIDE 9 MG/ML
1000 INJECTION INTRAMUSCULAR; INTRAVENOUS; SUBCUTANEOUS
Refills: 0 | Status: DISCONTINUED | OUTPATIENT
Start: 2023-09-07 | End: 2023-09-07

## 2023-09-07 RX ORDER — SODIUM CHLORIDE 9 MG/ML
1000 INJECTION INTRAMUSCULAR; INTRAVENOUS; SUBCUTANEOUS
Refills: 0 | Status: DISCONTINUED | OUTPATIENT
Start: 2023-09-07 | End: 2023-09-08

## 2023-09-07 RX ADMIN — Medication 88 MICROGRAM(S): at 05:55

## 2023-09-07 RX ADMIN — APIXABAN 5 MILLIGRAM(S): 2.5 TABLET, FILM COATED ORAL at 18:29

## 2023-09-07 RX ADMIN — GABAPENTIN 100 MILLIGRAM(S): 400 CAPSULE ORAL at 14:50

## 2023-09-07 RX ADMIN — PANTOPRAZOLE SODIUM 40 MILLIGRAM(S): 20 TABLET, DELAYED RELEASE ORAL at 05:55

## 2023-09-07 RX ADMIN — Medication 10 MILLIGRAM(S): at 12:10

## 2023-09-07 RX ADMIN — SODIUM CHLORIDE 50 MILLILITER(S): 9 INJECTION INTRAMUSCULAR; INTRAVENOUS; SUBCUTANEOUS at 12:04

## 2023-09-07 RX ADMIN — GABAPENTIN 100 MILLIGRAM(S): 400 CAPSULE ORAL at 05:54

## 2023-09-07 RX ADMIN — APIXABAN 5 MILLIGRAM(S): 2.5 TABLET, FILM COATED ORAL at 05:55

## 2023-09-07 RX ADMIN — SODIUM CHLORIDE 100 MILLILITER(S): 9 INJECTION INTRAMUSCULAR; INTRAVENOUS; SUBCUTANEOUS at 10:55

## 2023-09-07 RX ADMIN — Medication 81 MILLIGRAM(S): at 12:10

## 2023-09-07 RX ADMIN — SODIUM CHLORIDE 100 MILLILITER(S): 9 INJECTION INTRAMUSCULAR; INTRAVENOUS; SUBCUTANEOUS at 20:28

## 2023-09-07 RX ADMIN — SODIUM CHLORIDE 100 MILLILITER(S): 9 INJECTION INTRAMUSCULAR; INTRAVENOUS; SUBCUTANEOUS at 18:35

## 2023-09-07 RX ADMIN — LEVETIRACETAM 750 MILLIGRAM(S): 250 TABLET, FILM COATED ORAL at 18:29

## 2023-09-07 RX ADMIN — SODIUM CHLORIDE 250 MILLILITER(S): 9 INJECTION INTRAMUSCULAR; INTRAVENOUS; SUBCUTANEOUS at 10:55

## 2023-09-07 RX ADMIN — Medication 10 MILLIGRAM(S): at 21:55

## 2023-09-07 RX ADMIN — LEVETIRACETAM 750 MILLIGRAM(S): 250 TABLET, FILM COATED ORAL at 05:54

## 2023-09-07 RX ADMIN — ONDANSETRON 4 MILLIGRAM(S): 8 TABLET, FILM COATED ORAL at 18:45

## 2023-09-07 RX ADMIN — SODIUM CHLORIDE 250 MILLILITER(S): 9 INJECTION INTRAMUSCULAR; INTRAVENOUS; SUBCUTANEOUS at 12:10

## 2023-09-07 NOTE — PROGRESS NOTE ADULT - PROBLEM SELECTOR PLAN 3
SCreat elevated at 1.83, likely pre-renal due to hypotension/dehydration  vs urinary retention  Bladder scan 115ml, and 119ml   Continue IV NS at 50ml/hr  F/u renal US  Nephro Dr. Palmer consulted.   F/u Urine lytes.   Avoid nephrotoxic agents, NSAIDs

## 2023-09-07 NOTE — PROGRESS NOTE ADULT - SUBJECTIVE AND OBJECTIVE BOX
Date of Service 09-07-23 @ 11:46    CHIEF COMPLAINT:Patient is a 78y old  Female who presents with a chief complaint of enteritis/UTI.Pt appears comfortable,now AI and hypotension.    	  REVIEW OF SYSTEMS:  CONSTITUTIONAL: No fever, weight loss, or fatigue  EYES: No eye pain, visual disturbances, or discharge  ENT:  No difficulty hearing, tinnitus, vertigo; No sinus or throat pain  NECK: No pain or stiffness  RESPIRATORY: No cough, wheezing, chills or hemoptysis; No Shortness of Breath  CARDIOVASCULAR: No chest pain, palpitations, passing out, dizziness, or leg swelling  GASTROINTESTINAL: No abdominal or epigastric pain. No nausea, vomiting, or hematemesis; No diarrhea or constipation. No melena or hematochezia.  GENITOURINARY: No dysuria, frequency, hematuria, or incontinence  NEUROLOGICAL: No headaches, memory loss, loss of strength, numbness, or tremors  SKIN: No itching, burning, rashes, or lesions   LYMPH Nodes: No enlarged glands  ENDOCRINE: No heat or cold intolerance; No hair loss  MUSCULOSKELETAL: No joint pain or swelling; No muscle, back, or extremity pain  PSYCHIATRIC: No depression, anxiety, mood swings, or difficulty sleeping  HEME/LYMPH: No easy bruising, or bleeding gums  ALLERGY AND IMMUNOLOGIC: No hives or eczema	        PHYSICAL EXAM:  T(C): 36.3 (09-07-23 @ 10:44), Max: 36.6 (09-06-23 @ 20:42)  HR: 104 (09-07-23 @ 10:44) (95 - 104)  BP: 97/74 (09-07-23 @ 10:47) (87/61 - 106/75)  RR: 17 (09-07-23 @ 10:44) (17 - 20)  SpO2: 95% (09-07-23 @ 10:44) (95% - 98%)  Wt(kg): --  I&O's Summary      Appearance: Normal	  HEENT:   Normal oral mucosa, PERRL, EOMI	  Lymphatic: No lymphadenopathy  Cardiovascular: Normal S1 S2, No JVD, No murmurs, No edema  Respiratory: Lungs clear to auscultation	  Psychiatry: A & O x 3, Mood & affect appropriate  Gastrointestinal:  Soft, Non-tender, + BS	  Skin: No rashes, No ecchymoses, No cyanosis	  Neurologic: Non-focal  Extremities: Normal range of motion, No clubbing, cyanosis or edema  Vascular: Peripheral pulses palpable 2+ bilaterally    MEDICATIONS  (STANDING):  apixaban 5 milliGRAM(s) Oral every 12 hours  aspirin  chewable 81 milliGRAM(s) Oral daily  atorvastatin 20 milliGRAM(s) Oral at bedtime  gabapentin 100 milliGRAM(s) Oral every 8 hours  levETIRAcetam 750 milliGRAM(s) Oral two times a day  levoFLOXacin IVPB      levoFLOXacin IVPB 500 milliGRAM(s) IV Intermittent every 24 hours  levothyroxine 88 MICROGram(s) Oral daily  melatonin 5 milliGRAM(s) Oral at bedtime  oxybutynin 10 milliGRAM(s) Oral daily  pantoprazole    Tablet 40 milliGRAM(s) Oral before breakfast  sodium chloride 0.9% Bolus 250 milliLiter(s) IV Bolus once  sodium chloride 0.9%. 1000 milliLiter(s) (100 mL/Hr) IV Continuous <Continuous>        LABS:	 	                        12.4   16.65 )-----------( 514      ( 07 Sep 2023 08:05 )             37.2     09-07    135  |  103  |  71<H>  ----------------------------<  114<H>  4.3   |  20<L>  |  1.83<H>    Ca    7.9<L>      07 Sep 2023 08:05      proBNP:   Lipid Profile: Cholesterol 87  LDL --  HDL 55  TG 74  Ldl calc 17  Ratio --    HgA1c:   TSH: Thyroid Stimulating Hormone, Serum: 0.84 uU/mL (09-02 @ 06:58)

## 2023-09-07 NOTE — PROGRESS NOTE ADULT - PROBLEM SELECTOR PLAN 2
UA (+)  UC 9/1 UA (+)  UC 9/1 growing  Enterobacter cloacae complex  s/p Zosyn and Linezolid  Repeat UA (+) on 9/6  Leukocytosis downtrending  Continue Levaquin , renal dosed to 250mg IV qd  ID Dr. Monreal following

## 2023-09-07 NOTE — PROGRESS NOTE ADULT - SUBJECTIVE AND OBJECTIVE BOX
[   ] ICU                                          [   ] CCU                                      [ X  ] Medical Floor    Patient is a 78 year old female with persistent diarrhea. GI consulted to evaluate.         Patient is a 78 year old female with past medical history significant for transverse colon neoplasm s/p colectomy in [facility] in early August 2023, HTN, HLD, CAD s/p LHC with TALI to RCA, Atrial fibrillation s/p watchman implant (3/23), hypothyroidism, PUJA, and TIA presented to the emergency room with 2 days history of lightheaded and feels lethargic. Patient reports when her blood pressure was checked in the facility it was in the low 80s. Patient reports that since her surgery earlier in August she is not eating much at all. She reports that since the surgery she has had diarrhea. Patient reports she used to have 5-6 episodes of loose watery bowel movements every day and now have improved to 2-3 bowel movements daily. Patient reports due to fear of worsening diarrhea she has decreased diet and oral intake. Patient reports she only takes soup and anything heavy makes her diarrhea worse associated with intermittent, diffuse, non radiating, 3/10 intensity, crampy abdominal pain. . Patient denies nausea, vomiting, hematemesis, hematochezia, melena, fever, chills, chest pain, SOB, palpitation, cough, hematuria, dysuria, recent traveling.        Patient is comfortable. No new complaints reported, No abdominal pain, N/V, hematemesis, hematochezia, melena, fever, chills, chest pain, SOB, cough or diarrhea reported.      PAIN MANAGEMENT:  Pain Scale:                 3/10  Pain Location:  Diffuse crampy abdominal pain       PAST MEDICAL HISTORY    Atrial fibrillation    Hyperlipidemia    Hypertension    CAD (coronary artery disease)    Colon cancer    Hypothyroidism    Obstructive sleep apnea    Transient ischemic attack (TIA)             PAST SURGICAL HISTORY     Colectomy        Allergies    No Known Allergies    Intolerances  None       SOCIAL HISTORY  Advanced Directives:       [ X ] Full Code       [  ] DNR  Marital Status:         [  ] M      [ X ] S      [  ] D       [  ] W  Children:       [ X ] Yes      [  ] No  Occupation:        [  ] Employed       [ X ] Unemployed       [  ] Retired  Diet:       [ X ] Regular       [  ] PEG feeding          [  ] NG tube feeding  Drug Use:           [  ] Patient denied          [  ] Yes  Alcohol:           [ X ] No             [  ] Yes (socially)         [  ] Yes (chronic)  Tobacco:           [  ] Yes           [ X ] No      FAMILY HISTORY  [ X ] Heart Disease            [ X ] Diabetes             [ X ] HTN             [  ] Colon Cancer             [  ] Stomach Cancer              [  ] Pancreatic Cancer        VITALS  Vital Signs Last 24 Hrs  T(C): 36.6 (07 Sep 2023 21:14), Max: 36.6 (07 Sep 2023 21:14)  T(F): 97.8 (07 Sep 2023 21:14), Max: 97.8 (07 Sep 2023 21:14)  HR: 105 (07 Sep 2023 21:14) (95 - 105)  BP: 111/77 (07 Sep 2023 21:14) (87/61 - 111/77)   RR: 17 (07 Sep 2023 21:14) (16 - 17)  SpO2: 93% (07 Sep 2023 21:14) (93% - 98%)  Parameters below as of 07 Sep 2023 21:14  Patient On (Oxygen Delivery Method): room air       MEDICATIONS  (STANDING):  apixaban 5 milliGRAM(s) Oral every 12 hours  aspirin  chewable 81 milliGRAM(s) Oral daily  atorvastatin 20 milliGRAM(s) Oral at bedtime  gabapentin 100 milliGRAM(s) Oral every 8 hours  levETIRAcetam 750 milliGRAM(s) Oral two times a day  levoFLOXacin IVPB 250 milliGRAM(s) IV Intermittent every 24 hours  levothyroxine 88 MICROGram(s) Oral daily  melatonin 5 milliGRAM(s) Oral at bedtime  pantoprazole    Tablet 40 milliGRAM(s) Oral before breakfast  sodium chloride 0.9%. 1000 milliLiter(s) (100 mL/Hr) IV Continuous <Continuous>    MEDICATIONS  (PRN):  acetaminophen     Tablet .. 650 milliGRAM(s) Oral every 6 hours PRN Temp greater or equal to 38C (100.4F), Mild Pain (1 - 3)  albuterol    90 MICROgram(s) HFA Inhaler 2 Puff(s) Inhalation every 6 hours PRN Shortness of Breath and/or Wheezing  ALPRAZolam 0.25 milliGRAM(s) Oral two times a day PRN Anxiety/insomnia  metoclopramide Injectable 10 milliGRAM(s) IV Push every 8 hours PRN nausea, vomiting  ondansetron Injectable 4 milliGRAM(s) IV Push every 6 hours PRN Nausea and/or Vomiting  simethicone 80 milliGRAM(s) Chew four times a day PRN Gas                            12.4   16.65 )-----------( 514      ( 07 Sep 2023 08:05 )             37.2       09-07    135  |  103  |  71<H>  ----------------------------<  114<H>  4.3   |  20<L>  |  1.83<H>    Ca    7.9<L>      07 Sep 2023 08:05

## 2023-09-07 NOTE — PROGRESS NOTE ADULT - SUBJECTIVE AND OBJECTIVE BOX
Patient is seen and examined at the bed side, is afebrile but had a episode of hypotension, s/p IVF bolus and now BP is within normal limit.  The creatinine is trending up and Leukocytosis is trending down.       REVIEW OF SYSTEMS: All other review systems are negative      ALLERGIES: Zosyn ( Swelling of lips and itching)       Vital Signs Last 24 Hrs  T(C): 36.5 (07 Sep 2023 12:57), Max: 36.6 (06 Sep 2023 20:42)  T(F): 97.7 (07 Sep 2023 12:57), Max: 97.8 (06 Sep 2023 20:42)  HR: 97 (07 Sep 2023 12:57) (95 - 104)  BP: 103/69 (07 Sep 2023 12:57) (87/61 - 106/75)  BP(mean): --  RR: 16 (07 Sep 2023 12:57) (16 - 18)  SpO2: 97% (07 Sep 2023 12:57) (95% - 98%)    Parameters below as of 07 Sep 2023 12:57  Patient On (Oxygen Delivery Method): room air        PHYSICAL EXAM:  GENERAL: Not in distress   CHEST/LUNG: Not using accessory muscles  HEART: s1 and s2 present  ABDOMEN:  Nontender and incision site healed  EXTREMITIES: No pedal  edema  CNS: Awake and Alert      LABS:                        12.4   16.65 )-----------( 514      ( 07 Sep 2023 08:05 )             37.2                           13.5   17.87 )-----------( 592      ( 06 Sep 2023 10:25 )             41.4                      09-07    135  |  103  |  71<H>  ----------------------------<  114<H>  4.3   |  20<L>  |  1.83<H>    Ca    7.9<L>      07 Sep 2023 08:05           09-06    136  |  105  |  41<H>  ----------------------------<  122<H>  4.8   |  18<L>  |  1.26    Ca    8.4      06 Sep 2023 07:30  Phos  3.7     09-05      TPro  6.0  /  Alb  2.1<L>  /  TBili  0.4  /  DBili  x   /  AST  8<L>  /  ALT  23  /  AlkPhos  103  08-31        Urine Microscopic-Add On (NC) (09.01.23 @ 03:10)   Red Blood Cell - Urine: 0 /HPF  White Blood Cell - Urine: 4 /HPF  Bacteria: Many /HPF  Comment - Urine: few amorphous urates  Squamous Epithelial Cells: Present      MEDICATIONS  (STANDING):    apixaban 5 milliGRAM(s) Oral every 12 hours  aspirin  chewable 81 milliGRAM(s) Oral daily  atorvastatin 20 milliGRAM(s) Oral at bedtime  gabapentin 100 milliGRAM(s) Oral every 8 hours  levETIRAcetam 750 milliGRAM(s) Oral two times a day  levoFLOXacin IVPB 250 milliGRAM(s) IV Intermittent every 24 hours  levothyroxine 88 MICROGram(s) Oral daily  melatonin 5 milliGRAM(s) Oral at bedtime  oxybutynin 10 milliGRAM(s) Oral daily  pantoprazole    Tablet 40 milliGRAM(s) Oral before breakfast  sodium chloride 0.9%. 1000 milliLiter(s) (50 mL/Hr) IV Continuous <Continuous>        RADIOLOGY & ADDITIONAL TESTS:    9/2/23 : Xray Abdomen 1 View (09.02.23 @ 10:24) Markedly distended small bowel loops throughout the abdomen   and pelvis is similar to recent CT. The CT demonstrates a status post colectomy and ileorectal anastomosis. Evaluation for free air limited on   this supine projection Subcutaneous emphysema noted laterally corresponding to recent CT abnormality.      9/1/23 : CT Abdomen and Pelvis w/ Oral Cont (09.01.23 @ 15:20) Status post colectomy with ileorectal anastomosis.    Fluid throughout non- obstructed bowel and rectum, consistent with enteritis.    Extensive subcutaneous emphysema in the anterior abdominal wall. While findings may be postoperative given the history of recent surgery, in the   appropriate clinical context, necrotizing infection may be considered. Careful clinical evaluation is therefore warranted.    These findings were discussed with Dr. MARQUITA MORFIN [phone] at  9/1/2023 3:43 PM with readback.      8/31/23 : Xray Chest 1 View- PORTABLE-Urgent (Xray Chest 1 View- PORTABLE-Urgent .) (08.31.23 @ 23:17) No acute pulmonary disease.        MICROBIOLOGY DATA:    Urine Microscopic-Add On (NC) (09.06.23 @ 10:40)   Red Blood Cell - Urine: 5 /HPF  White Blood Cell - Urine: 20 /HPF  Bacteria: Many /HPF  Squamous Epithelial Cells: many    Clostridium difficile Toxin by PCR (09.02.23 @ 05:16)   C Diff by PCR Result: NotDetec:     Culture - Stool (09.01.23 @ 03:30)   Specimen Source: .Stool Feces  Culture Results:   No enteric pathogens to date: Final culture pending    GI PCR Panel Stool (09.01.23 @ 03:30)   GI PCR Panel: NotDetec:     Urine Microscopic-Add On (NC) (09.01.23 @ 03:10)   Red Blood Cell - Urine: 0 /HPF  White Blood Cell - Urine: 4 /HPF  Bacteria: Many /HPF  Comment - Urine: few amorphous urates  Squamous Epithelial Cells: Present    Rapid HIV-1/2 Antibody (08.31.23 @ 21:30)   Rapid HIV-1/2 Antibody: Nonreact:                                   Patient is seen and examined at the bed side, is afebrile but had a episode of hypotension, s/p IVF bolus and now BP is within normal limit.  The creatinine is trending up and Leukocytosis is trending down.       REVIEW OF SYSTEMS: All other review systems are negative      ALLERGIES: Zosyn ( Swelling of lips and itching)       Vital Signs Last 24 Hrs  T(C): 36.5 (07 Sep 2023 12:57), Max: 36.6 (06 Sep 2023 20:42)  T(F): 97.7 (07 Sep 2023 12:57), Max: 97.8 (06 Sep 2023 20:42)  HR: 97 (07 Sep 2023 12:57) (95 - 104)  BP: 103/69 (07 Sep 2023 12:57) (87/61 - 106/75)  BP(mean): --  RR: 16 (07 Sep 2023 12:57) (16 - 18)  SpO2: 97% (07 Sep 2023 12:57) (95% - 98%)    Parameters below as of 07 Sep 2023 12:57  Patient On (Oxygen Delivery Method): room air        PHYSICAL EXAM:  GENERAL: Not in distress   CHEST/LUNG: Not using accessory muscles  HEART: s1 and s2 present  ABDOMEN:  Nontender and incision site healed  EXTREMITIES: No pedal  edema  CNS: Awake and Alert      LABS:                        12.4   16.65 )-----------( 514      ( 07 Sep 2023 08:05 )             37.2                           13.5   17.87 )-----------( 592      ( 06 Sep 2023 10:25 )             41.4                      09-07    135  |  103  |  71<H>  ----------------------------<  114<H>  4.3   |  20<L>  |  1.83<H>    Ca    7.9<L>      07 Sep 2023 08:05           09-06    136  |  105  |  41<H>  ----------------------------<  122<H>  4.8   |  18<L>  |  1.26    Ca    8.4      06 Sep 2023 07:30  Phos  3.7     09-05      TPro  6.0  /  Alb  2.1<L>  /  TBili  0.4  /  DBili  x   /  AST  8<L>  /  ALT  23  /  AlkPhos  103  08-31        Urine Microscopic-Add On (NC) (09.01.23 @ 03:10)   Red Blood Cell - Urine: 0 /HPF  White Blood Cell - Urine: 4 /HPF  Bacteria: Many /HPF  Comment - Urine: few amorphous urates  Squamous Epithelial Cells: Present      MEDICATIONS  (STANDING):    apixaban 5 milliGRAM(s) Oral every 12 hours  aspirin  chewable 81 milliGRAM(s) Oral daily  atorvastatin 20 milliGRAM(s) Oral at bedtime  gabapentin 100 milliGRAM(s) Oral every 8 hours  levETIRAcetam 750 milliGRAM(s) Oral two times a day  levoFLOXacin IVPB 250 milliGRAM(s) IV Intermittent every 24 hours  levothyroxine 88 MICROGram(s) Oral daily  melatonin 5 milliGRAM(s) Oral at bedtime  oxybutynin 10 milliGRAM(s) Oral daily  pantoprazole    Tablet 40 milliGRAM(s) Oral before breakfast  sodium chloride 0.9%. 1000 milliLiter(s) (50 mL/Hr) IV Continuous <Continuous>        RADIOLOGY & ADDITIONAL TESTS:    9/2/23 : Xray Abdomen 1 View (09.02.23 @ 10:24) Markedly distended small bowel loops throughout the abdomen   and pelvis is similar to recent CT. The CT demonstrates a status post colectomy and ileorectal anastomosis. Evaluation for free air limited on   this supine projection Subcutaneous emphysema noted laterally corresponding to recent CT abnormality.      9/1/23 : CT Abdomen and Pelvis w/ Oral Cont (09.01.23 @ 15:20) Status post colectomy with ileorectal anastomosis.    Fluid throughout non- obstructed bowel and rectum, consistent with enteritis.    Extensive subcutaneous emphysema in the anterior abdominal wall. While findings may be postoperative given the history of recent surgery, in the   appropriate clinical context, necrotizing infection may be considered. Careful clinical evaluation is therefore warranted.    These findings were discussed with Dr. MARQUITA MORFIN [phone] at  9/1/2023 3:43 PM with readback.      8/31/23 : Xray Chest 1 View- PORTABLE-Urgent (Xray Chest 1 View- PORTABLE-Urgent .) (08.31.23 @ 23:17) No acute pulmonary disease.        MICROBIOLOGY DATA:    Urine Microscopic-Add On (NC) (09.06.23 @ 10:40)   Red Blood Cell - Urine: 5 /HPF  White Blood Cell - Urine: 20 /HPF  Bacteria: Many /HPF  Squamous Epithelial Cells: many    Clostridium difficile Toxin by PCR (09.02.23 @ 05:16)   C Diff by PCR Result: NotDetec:     Culture - Stool (09.01.23 @ 03:30)   Specimen Source: .Stool Feces  Culture Results:   No enteric pathogens to date: Final culture pending    GI PCR Panel Stool (09.01.23 @ 03:30)   GI PCR Panel: NotDetec:     Urine Microscopic-Add On (NC) (09.01.23 @ 03:10)   Red Blood Cell - Urine: 0 /HPF  White Blood Cell - Urine: 4 /HPF  Bacteria: Many /HPF  Comment - Urine: few amorphous urates  Squamous Epithelial Cells: Present    Rapid HIV-1/2 Antibody (08.31.23 @ 21:30)   Rapid HIV-1/2 Antibody: Nonreact:                                   Patient is seen and examined at the bed side, is afebrile but had a episode of hypotension, s/p IVF bolus and now BP is within normal limit.  The creatinine is trending up and Leukocytosis is trending down.       REVIEW OF SYSTEMS: All other review systems are negative      ALLERGIES: Zosyn ( Swelling of lips and itching)       Vital Signs Last 24 Hrs  T(C): 36.5 (07 Sep 2023 12:57), Max: 36.6 (06 Sep 2023 20:42)  T(F): 97.7 (07 Sep 2023 12:57), Max: 97.8 (06 Sep 2023 20:42)  HR: 97 (07 Sep 2023 12:57) (95 - 104)  BP: 103/69 (07 Sep 2023 12:57) (87/61 - 106/75)  BP(mean): --  RR: 16 (07 Sep 2023 12:57) (16 - 18)  SpO2: 97% (07 Sep 2023 12:57) (95% - 98%)    Parameters below as of 07 Sep 2023 12:57  Patient On (Oxygen Delivery Method): room air        PHYSICAL EXAM:  GENERAL: Not in distress   CHEST/LUNG: Not using accessory muscles  HEART: s1 and s2 present  ABDOMEN:  Nontender and incision site healed  EXTREMITIES: No pedal  edema  CNS: Awake and Alert      LABS:                        12.4   16.65 )-----------( 514      ( 07 Sep 2023 08:05 )             37.2                           13.5   17.87 )-----------( 592      ( 06 Sep 2023 10:25 )             41.4                      09-07    135  |  103  |  71<H>  ----------------------------<  114<H>  4.3   |  20<L>  |  1.83<H>    Ca    7.9<L>      07 Sep 2023 08:05           09-06    136  |  105  |  41<H>  ----------------------------<  122<H>  4.8   |  18<L>  |  1.26    Ca    8.4      06 Sep 2023 07:30  Phos  3.7     09-05      TPro  6.0  /  Alb  2.1<L>  /  TBili  0.4  /  DBili  x   /  AST  8<L>  /  ALT  23  /  AlkPhos  103  08-31        Urine Microscopic-Add On (NC) (09.01.23 @ 03:10)   Red Blood Cell - Urine: 0 /HPF  White Blood Cell - Urine: 4 /HPF  Bacteria: Many /HPF  Comment - Urine: few amorphous urates  Squamous Epithelial Cells: Present      MEDICATIONS  (STANDING):    apixaban 5 milliGRAM(s) Oral every 12 hours  aspirin  chewable 81 milliGRAM(s) Oral daily  atorvastatin 20 milliGRAM(s) Oral at bedtime  gabapentin 100 milliGRAM(s) Oral every 8 hours  levETIRAcetam 750 milliGRAM(s) Oral two times a day  levoFLOXacin IVPB 250 milliGRAM(s) IV Intermittent every 24 hours  levothyroxine 88 MICROGram(s) Oral daily  melatonin 5 milliGRAM(s) Oral at bedtime  oxybutynin 10 milliGRAM(s) Oral daily  pantoprazole    Tablet 40 milliGRAM(s) Oral before breakfast  sodium chloride 0.9%. 1000 milliLiter(s) (50 mL/Hr) IV Continuous <Continuous>        RADIOLOGY & ADDITIONAL TESTS:    9/2/23 : Xray Abdomen 1 View (09.02.23 @ 10:24) Markedly distended small bowel loops throughout the abdomen   and pelvis is similar to recent CT. The CT demonstrates a status post colectomy and ileorectal anastomosis. Evaluation for free air limited on   this supine projection Subcutaneous emphysema noted laterally corresponding to recent CT abnormality.      9/1/23 : CT Abdomen and Pelvis w/ Oral Cont (09.01.23 @ 15:20) Status post colectomy with ileorectal anastomosis.    Fluid throughout non- obstructed bowel and rectum, consistent with enteritis.    Extensive subcutaneous emphysema in the anterior abdominal wall. While findings may be postoperative given the history of recent surgery, in the   appropriate clinical context, necrotizing infection may be considered. Careful clinical evaluation is therefore warranted.    These findings were discussed with Dr. MARQUITA MORFIN [phone] at  9/1/2023 3:43 PM with readback.      8/31/23 : Xray Chest 1 View- PORTABLE-Urgent (Xray Chest 1 View- PORTABLE-Urgent .) (08.31.23 @ 23:17) No acute pulmonary disease.        MICROBIOLOGY DATA:    Urine Microscopic-Add On (NC) (09.06.23 @ 10:40)   Red Blood Cell - Urine: 5 /HPF  White Blood Cell - Urine: 20 /HPF  Bacteria: Many /HPF  Squamous Epithelial Cells: many    Clostridium difficile Toxin by PCR (09.02.23 @ 05:16)   C Diff by PCR Result: NotDetec:     Culture - Stool (09.01.23 @ 03:30)   Specimen Source: .Stool Feces  Culture Results:   No enteric pathogens to date: Final culture pending    GI PCR Panel Stool (09.01.23 @ 03:30)   GI PCR Panel: NotDetec:     Urine Microscopic-Add On (NC) (09.01.23 @ 03:10)   Red Blood Cell - Urine: 0 /HPF  White Blood Cell - Urine: 4 /HPF  Bacteria: Many /HPF  Comment - Urine: few amorphous urates  Squamous Epithelial Cells: Present    Rapid HIV-1/2 Antibody (08.31.23 @ 21:30)   Rapid HIV-1/2 Antibody: Nonreact:

## 2023-09-07 NOTE — PROGRESS NOTE ADULT - PROBLEM SELECTOR PLAN 4
in setting of diarrhea, poor po intake vs sepsis   Lactate 1.2  Leukocytosis downtrending   UA(+)  BC NGTD   Continue IV fluids  c/w abx

## 2023-09-07 NOTE — PROGRESS NOTE ADULT - ASSESSMENT
Patient is a 77 y/o F with PMH transverse colon neoplasm s/p colectomy in [facility] 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 Patient  p/w weakness, lethargy, poor appetite, light headedness  and diarrhea.  Patient reports when her blood pressure was checked in the facility it was in the low 80s. CT A/P showed Status post colectomy with ileorectal anastomosis.Fluid throughout nonobstructed bowel and rectum, consistent with enteritis.  Extensive subcutaneous emphysema in the anterior abdominal wall. While   findings may be postoperative given the history of recent surgery, in the appropriate clinical context, necrotizing infection may be considered.   Careful clinical evaluation is therefore warranted.  Pt. admitted for enteritis vs. necrotizing fascitis in setting of recent  robotic surgery at [facility].  Pt. followed by surgery, no interventions indicated at this time.  ID Dr. Monreal following, no antibiotics indicated at this time.  Diet advanced to full liquid.  9/6-Leukocytosis trending up, afebrile.  UA positive, started on Levaquin IV.     Patient is a 79 y/o F with PMH transverse colon neoplasm s/p colectomy in [facility] 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 Patient  p/w weakness, lethargy, poor appetite, light headedness  and diarrhea.  Patient reports when her blood pressure was checked in the facility it was in the low 80s. CT A/P showed Status post colectomy with ileorectal anastomosis.Fluid throughout nonobstructed bowel and rectum, consistent with enteritis.  Extensive subcutaneous emphysema in the anterior abdominal wall. While   findings may be postoperative given the history of recent surgery, in the appropriate clinical context, necrotizing infection may be considered.   Careful clinical evaluation is therefore warranted.  Pt. admitted for enteritis vs. necrotizing fascitis in setting of recent  robotic surgery at [facility].  Pt. followed by surgery, no interventions indicated at this time.  ID Dr. Monreal following, no antibiotics indicated at this time.  Diet advanced to full liquid.  9/6-Leukocytosis trending up, afebrile.  UA positive, started on Levaquin IV.     Patient is a 79 y/o F with PMH transverse colon neoplasm s/p colectomy in [facility] 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 Patient  p/w weakness, lethargy, poor appetite, light headedness  and diarrhea.  Patient reports when her blood pressure was checked in the facility it was in the low 80s. CT A/P showed Status post colectomy with ileorectal anastomosis.Fluid throughout nonobstructed bowel and rectum, consistent with enteritis.  Extensive subcutaneous emphysema in the anterior abdominal wall. While   findings may be postoperative given the history of recent surgery, in the appropriate clinical context, necrotizing infection may be considered.   Careful clinical evaluation is therefore warranted.  Pt. admitted for enteritis vs. necrotizing fascitis in setting of recent  robotic surgery at [facility].  Pt. followed by surgery, no interventions indicated at this time.  ID Dr. Monreal following, no antibiotics indicated at this time.  Diet advanced to full liquid.  9/6-Leukocytosis trending up, afebrile.  UA positive, started on Levaquin IV.

## 2023-09-07 NOTE — PROGRESS NOTE ADULT - PROBLEM SELECTOR PLAN 12
Pt is from [facility] following   Monitor BP, ROSSANA  F/u renal US  F/u Nephro consult with Dr. Palmer Pt is from [facility] following   Monitor BP, ROSSANA  F/u renal US  F/u Nephro consult with Dr. Palmer Pt is from [facility] following   Monitor BP, ROSSANA  F/u renal US  F/u Nephro consult with Dr. Palmer Pt is on eliquis

## 2023-09-07 NOTE — CONSULT NOTE ADULT - SUBJECTIVE AND OBJECTIVE BOX
Chief complain/HPI  Post transverse colectomy in August 2023. She came to the ER for continuos watery diarrhea since time of surgery .  Consult was called fro ROSSANA  Reduced po intake and 2 watery MB large a day.  Leukocytosis   UTI.     PAST MEDICAL & SURGICAL HISTORY:  Atrial fibrillation      Hyperlipidemia      Hypertension      CAD (coronary artery disease)      Colon cancer      Hypothyroidism      Obstructive sleep apnea      Transient ischemic attack (TIA)      Atrial fibrillation      H/O colectomy          Home Medications Reviewed    Hospital Medications:   MEDICATIONS  (STANDING):  apixaban 5 milliGRAM(s) Oral every 12 hours  aspirin  chewable 81 milliGRAM(s) Oral daily  atorvastatin 20 milliGRAM(s) Oral at bedtime  gabapentin 100 milliGRAM(s) Oral every 8 hours  levETIRAcetam 750 milliGRAM(s) Oral two times a day  levoFLOXacin IVPB 250 milliGRAM(s) IV Intermittent every 24 hours  levothyroxine 88 MICROGram(s) Oral daily  melatonin 5 milliGRAM(s) Oral at bedtime  pantoprazole    Tablet 40 milliGRAM(s) Oral before breakfast    MEDICATIONS  (PRN):  acetaminophen     Tablet .. 650 milliGRAM(s) Oral every 6 hours PRN Temp greater or equal to 38C (100.4F), Mild Pain (1 - 3)  albuterol    90 MICROgram(s) HFA Inhaler 2 Puff(s) Inhalation every 6 hours PRN Shortness of Breath and/or Wheezing  ALPRAZolam 0.25 milliGRAM(s) Oral two times a day PRN Anxiety/insomnia  metoclopramide Injectable 10 milliGRAM(s) IV Push every 8 hours PRN nausea, vomiting  ondansetron Injectable 4 milliGRAM(s) IV Push every 6 hours PRN Nausea and/or Vomiting  simethicone 80 milliGRAM(s) Chew four times a day PRN Gas      Allergies    Zosyn (Hives; Urticaria)    Intolerances                              12.4   16.65 )-----------( 514      ( 07 Sep 2023 08:05 )             37.2     09-07    135  |  103  |  71<H>  ----------------------------<  114<H>  4.3   |  20<L>  |  1.83<H>    Ca    7.9<L>      07 Sep 2023 08:05        Urinalysis Basic - ( 07 Sep 2023 08:05 )    Color: x / Appearance: x / SG: x / pH: x  Gluc: 114 mg/dL / Ketone: x  / Bili: x / Urobili: x   Blood: x / Protein: x / Nitrite: x   Leuk Esterase: x / RBC: x / WBC x   Sq Epi: x / Non Sq Epi: x / Bacteria: x            RADIOLOGY & ADDITIONAL STUDIES:    SOCIAL HISTORY: Denies ETOh,Smoking,     FAMILY HISTORY:      REVIEW OF SYSTEMS:  CONSTITUTIONAL: C/O MALAISE AND FATIGUE  RESPIRATORY: No cough, wheezing, hemoptysis; No shortness of breath  CARDIOVASCULAR: No chest pain or palpitations. No edema  GASTROINTESTINAL: No abdominal or epigastric pain. No nausea, vomiting, or hematemesis; Diarrhea a above  GENITOURINARY: No dysuria, frequency, foamy urine, urinary urgency, incontinence or hematuria      VITALS:  Vital Signs Last 24 Hrs  T(C): 36.5 (07 Sep 2023 12:57), Max: 36.6 (06 Sep 2023 20:42)  T(F): 97.7 (07 Sep 2023 12:57), Max: 97.8 (06 Sep 2023 20:42)  HR: 97 (07 Sep 2023 12:57) (95 - 104)  BP: 103/69 (07 Sep 2023 12:57) (87/61 - 106/75)  BP(mean): --  RR: 16 (07 Sep 2023 12:57) (16 - 18)  SpO2: 97% (07 Sep 2023 12:57) (95% - 98%)    Parameters below as of 07 Sep 2023 12:57  Patient On (Oxygen Delivery Method): room air            PHYSICAL EXAM:  Constitutional: NAD, appears ill .  HEENT: anicteric sclera, oropharynx clear, MMM    Respiratory: good air entrance B/L, no wheezes, rales or rhonchi  Cardiovascular: S1, S2, RRR, no pericardial rub, no murmur  Gastrointestinal: BS+, soft, no tenderness, no distension, no bruit  Pelvis: bladder non-distended, no CVA tenderness  Extremities: No cyanosis or clubbing. No peripheral edema  Neurological: A/O x 3, no focal deficits  reduced skin turgor

## 2023-09-07 NOTE — CONSULT NOTE ADULT - CONSULT REASON
Extensive subcutaneous emphysema in the anterior abdominal wall, r/o necrotizing fascitis; hx of Robotic Colon Resection
Hypotension and diarrhea
shelbie
Diarrhea

## 2023-09-07 NOTE — CONSULT NOTE ADULT - ASSESSMENT
ROSSANA , most likely pre renal due to hypovolemia, hypotension and r/o sepsis.  Reduced po intake with fluid losses via GI tract, enteritis.   Continue IV fluids , Increased NS ro 100 ml per hour.  Follow urine lytes and osmolality.    Avoid NSAID and radiocontrast.

## 2023-09-07 NOTE — PROGRESS NOTE ADULT - ASSESSMENT
77 y/o F with PMH transverse colon neoplasm s/p colectomy in [facility] 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 Patient reports that since yesterday she feels weak and tired,chronic diarrhea,hypotension, now ROSSANA.  1.ROSSANA-Renal eval,IVF,Nephro sono. Bladder scan only 100cc.  2.Chronic diarrhea.  3.Hypothyroid-synthroid.  4.Echocardiogram can be done as outpatient.  5.CAD-asa,b blocker,statin.  6.Afib-eliquis.  7.UTI-ABX as per ID.  8.GI prophylaxis.  9.Blood cx ordered. 79 y/o F with PMH transverse colon neoplasm s/p colectomy in [facility] 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 Patient reports that since yesterday she feels weak and tired,chronic diarrhea,hypotension, now ROSSANA.  1.ROSSANA-Renal eval,IVF,Nephro sono. Bladder scan only 100cc.  2.Chronic diarrhea.  3.Hypothyroid-synthroid.  4.Echocardiogram can be done as outpatient.  5.CAD-asa,b blocker,statin.  6.Afib-eliquis.  7.UTI-ABX as per ID.  8.GI prophylaxis.  9.Blood cx ordered. 79 y/o F with PMH transverse colon neoplasm s/p colectomy in [facility] 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 Patient reports that since yesterday she feels weak and tired,chronic diarrhea,hypotension, now ROSSANA.  1.ROSSANA-Renal eval,IVF,Nephro sono. Bladder scan only 100cc.  2.Chronic diarrhea.  3.Hypothyroid-synthroid.  4.Echocardiogram can be done as outpatient.  5.CAD-asa,b blocker,statin.  6.Afib-eliquis.  7.UTI-ABX as per ID.  8.GI prophylaxis.  9.Blood cx ordered.

## 2023-09-07 NOTE — PROGRESS NOTE ADULT - PROBLEM SELECTOR PLAN 8
Continue ASA, statin Pt on Keppra 750 mg po BID for seizure disorder per [facility] medical record.   Continue Keppra

## 2023-09-07 NOTE — PROGRESS NOTE ADULT - ASSESSMENT
Patient is a 78y old  Female with PMH of transverse colon neoplasm s/p colectomy in [facility] in early August, HTN, HLD, CAD s/p LHC with TALI to RCA, Atrial fibrillation s/p watchman implant (3/23), hypothyroidism, PUJA (not on cpap), TIA, now presents to the ER for evaluation of weak, tired, lightheaded and low blood pressure, in the low 80s. Patient reports that since her surgery earlier in August she is not eating much at all and  has had diarrhea. She used to have 5-6 episodes of loose watery bowel movements every day and now have improved to 2-3 bowel movements daily. She has no fever or chills. ON Admission, she found to have no fever but Low Blood pressure, BP of 93/62. The stool studies are in process. The ID consult requested to assist with further evaluation and antibiotic management.     # Hypotension - Another episode today, 9/7/23- responded to IVF resuscitation  # R/O Infectious etiology of diarrhea- C.difficile PCR and GI pathogen PCR is negative   #Enteritis and Necrotizing  fasciitis - CT abd/pelvis shows " Extensive subcutaneous emphysema in the anterior abdominal wall. While findings may be postoperative given the history of recent surgery, in the appropriate clinical context, necrotizing infection may be considered."  - As per surgery " Extensive subcutaneous emphysema in the anterior abdominal wall most likely related to recent surgery, necrotizing fascitis very unlikely."   # UTI- ( White Blood Cell - Urine: 20 /HPF ) -as of 9/6/23, UA is positive in the setting of diarrhea ,  no culture noted in the system    would recommend:    1. Monitor kidney function and IVF  2. Adjust  Levaquin ( Qtc is 457) doses to 250 mg dialy  since Allergic to PCN  3. Monitor WBC count, started trending down  4. Follow up Renal US  5. OOB to chair    d/w covering NP, Titus and  Patient     Attending Attestation:    Spent more than 35 minutes on total encounter, more than 50 % of the visit was spent counseling and/or coordinating care by the Attending physician.         Patient is a 78y old  Female with PMH of transverse colon neoplasm s/p colectomy in [facility] in early August, HTN, HLD, CAD s/p LHC with TALI to RCA, Atrial fibrillation s/p watchman implant (3/23), hypothyroidism, PUJA (not on cpap), TIA, now presents to the ER for evaluation of weak, tired, lightheaded and low blood pressure, in the low 80s. Patient reports that since her surgery earlier in August she is not eating much at all and  has had diarrhea. She used to have 5-6 episodes of loose watery bowel movements every day and now have improved to 2-3 bowel movements daily. She has no fever or chills. ON Admission, she found to have no fever but Low Blood pressure, BP of 93/62. The stool studies are in process. The ID consult requested to assist with further evaluation and antibiotic management.     # Hypotension - Another episode today, 9/7/23- responded to IVF resuscitation  # R/O Infectious etiology of diarrhea- C.difficile PCR and GI pathogen PCR is negative   #Enteritis and Necrotizing  fasciitis - CT abd/pelvis shows " Extensive subcutaneous emphysema in the anterior abdominal wall. While findings may be postoperative given the history of recent surgery, in the appropriate clinical context, necrotizing infection may be considered."  - As per surgery " Extensive subcutaneous emphysema in the anterior abdominal wall most likely related to recent surgery, necrotizing fascitis very unlikely."   # UTI- ( White Blood Cell - Urine: 20 /HPF ) -as of 9/6/23, UA is positive in the setting of diarrhea ,  no culture noted in the system    would recommend:    1. Monitor kidney function and IVF  2. Adjust  Levaquin ( Qtc is 457) doses to 250 mg dialy  since Allergic to PCN  3. Monitor WBC count, started trending down  4. Follow up Renal US  5. OOB to chair    d/w covering NP, Titus and  Patient     Attending Attestation:    Spent more than 35 minutes on total encounter, more than 50 % of the visit was spent counseling and/or coordinating care by the Attending physician.         Patient is a 78y old  Female with PMH of transverse colon neoplasm s/p colectomy in [facility] in early August, HTN, HLD, CAD s/p LHC with TALI to RCA, Atrial fibrillation s/p watchman implant (3/23), hypothyroidism, PUJA (not on cpap), TIA, now presents to the ER for evaluation of weak, tired, lightheaded and low blood pressure, in the low 80s. Patient reports that since her surgery earlier in August she is not eating much at all and  has had diarrhea. She used to have 5-6 episodes of loose watery bowel movements every day and now have improved to 2-3 bowel movements daily. She has no fever or chills. ON Admission, she found to have no fever but Low Blood pressure, BP of 93/62. The stool studies are in process. The ID consult requested to assist with further evaluation and antibiotic management.     # Hypotension - Another episode today, 9/7/23- responded to IVF resuscitation  # R/O Infectious etiology of diarrhea- C.difficile PCR and GI pathogen PCR is negative   #Enteritis and Necrotizing  fasciitis - CT abd/pelvis shows " Extensive subcutaneous emphysema in the anterior abdominal wall. While findings may be postoperative given the history of recent surgery, in the appropriate clinical context, necrotizing infection may be considered."  - As per surgery " Extensive subcutaneous emphysema in the anterior abdominal wall most likely related to recent surgery, necrotizing fascitis very unlikely."   # UTI- ( White Blood Cell - Urine: 20 /HPF ) -as of 9/6/23, UA is positive in the setting of diarrhea ,  no culture noted in the system    would recommend:    1. Monitor kidney function and IVF  2. Adjust  Levaquin ( Qtc is 457) doses to 250 mg dialy  since Allergic to PCN  3. Monitor WBC count, started trending down  4. Follow up Renal US  5. OOB to chair    d/w covering NP, Titus and  Patient     Attending Attestation:    Spent more than 35 minutes on total encounter, more than 50 % of the visit was spent counseling and/or coordinating care by the Attending physician.

## 2023-09-08 DIAGNOSIS — Z02.9 ENCOUNTER FOR ADMINISTRATIVE EXAMINATIONS, UNSPECIFIED: ICD-10-CM

## 2023-09-08 DIAGNOSIS — R74.01 ELEVATION OF LEVELS OF LIVER TRANSAMINASE LEVELS: ICD-10-CM

## 2023-09-08 LAB
ALBUMIN SERPL ELPH-MCNC: 2 G/DL — LOW (ref 3.5–5)
ALP SERPL-CCNC: 132 U/L — HIGH (ref 40–120)
ALT FLD-CCNC: 99 U/L DA — HIGH (ref 10–60)
ANION GAP SERPL CALC-SCNC: 9 MMOL/L — SIGNIFICANT CHANGE UP (ref 5–17)
AST SERPL-CCNC: 88 U/L — HIGH (ref 10–40)
BILIRUB SERPL-MCNC: 0.4 MG/DL — SIGNIFICANT CHANGE UP (ref 0.2–1.2)
BUN SERPL-MCNC: 73 MG/DL — HIGH (ref 7–18)
CALCIUM SERPL-MCNC: 7.6 MG/DL — LOW (ref 8.4–10.5)
CHLORIDE SERPL-SCNC: 108 MMOL/L — SIGNIFICANT CHANGE UP (ref 96–108)
CO2 SERPL-SCNC: 20 MMOL/L — LOW (ref 22–31)
CREAT ?TM UR-MCNC: 138 MG/DL — SIGNIFICANT CHANGE UP
CREAT SERPL-MCNC: 1.21 MG/DL — SIGNIFICANT CHANGE UP (ref 0.5–1.3)
EGFR: 46 ML/MIN/1.73M2 — LOW
GLUCOSE SERPL-MCNC: 108 MG/DL — HIGH (ref 70–99)
HCT VFR BLD CALC: 35 % — SIGNIFICANT CHANGE UP (ref 34.5–45)
HGB BLD-MCNC: 11.8 G/DL — SIGNIFICANT CHANGE UP (ref 11.5–15.5)
LACTATE SERPL-SCNC: 1.1 MMOL/L — SIGNIFICANT CHANGE UP (ref 0.7–2)
MAGNESIUM SERPL-MCNC: 1.4 MG/DL — LOW (ref 1.6–2.6)
MCHC RBC-ENTMCNC: 28 PG — SIGNIFICANT CHANGE UP (ref 27–34)
MCHC RBC-ENTMCNC: 33.7 GM/DL — SIGNIFICANT CHANGE UP (ref 32–36)
MCV RBC AUTO: 82.9 FL — SIGNIFICANT CHANGE UP (ref 80–100)
NRBC # BLD: 0 /100 WBCS — SIGNIFICANT CHANGE UP (ref 0–0)
OSMOLALITY UR: 867 MOS/KG — SIGNIFICANT CHANGE UP (ref 50–1200)
PLATELET # BLD AUTO: 439 K/UL — HIGH (ref 150–400)
POTASSIUM SERPL-MCNC: 3.9 MMOL/L — SIGNIFICANT CHANGE UP (ref 3.5–5.3)
POTASSIUM SERPL-SCNC: 3.9 MMOL/L — SIGNIFICANT CHANGE UP (ref 3.5–5.3)
PROT ?TM UR-MCNC: 47 MG/DL — HIGH (ref 0–12)
PROT SERPL-MCNC: 5.2 G/DL — LOW (ref 6–8.3)
RBC # BLD: 4.22 M/UL — SIGNIFICANT CHANGE UP (ref 3.8–5.2)
RBC # FLD: 15.7 % — HIGH (ref 10.3–14.5)
SODIUM SERPL-SCNC: 137 MMOL/L — SIGNIFICANT CHANGE UP (ref 135–145)
SODIUM UR-SCNC: 6 MMOL/L — SIGNIFICANT CHANGE UP
WBC # BLD: 20.37 K/UL — HIGH (ref 3.8–10.5)
WBC # FLD AUTO: 20.37 K/UL — HIGH (ref 3.8–10.5)

## 2023-09-08 PROCEDURE — 71045 X-RAY EXAM CHEST 1 VIEW: CPT | Mod: 26

## 2023-09-08 PROCEDURE — 74018 RADEX ABDOMEN 1 VIEW: CPT | Mod: 26

## 2023-09-08 RX ORDER — MORPHINE SULFATE 50 MG/1
2 CAPSULE, EXTENDED RELEASE ORAL ONCE
Refills: 0 | Status: DISCONTINUED | OUTPATIENT
Start: 2023-09-08 | End: 2023-09-08

## 2023-09-08 RX ORDER — SODIUM CHLORIDE 9 MG/ML
1000 INJECTION INTRAMUSCULAR; INTRAVENOUS; SUBCUTANEOUS
Refills: 0 | Status: DISCONTINUED | OUTPATIENT
Start: 2023-09-08 | End: 2023-09-08

## 2023-09-08 RX ORDER — MAGNESIUM SULFATE 500 MG/ML
2 VIAL (ML) INJECTION ONCE
Refills: 0 | Status: COMPLETED | OUTPATIENT
Start: 2023-09-08 | End: 2023-09-08

## 2023-09-08 RX ORDER — LEVOTHYROXINE SODIUM 125 MCG
66 TABLET ORAL AT BEDTIME
Refills: 0 | Status: DISCONTINUED | OUTPATIENT
Start: 2023-09-08 | End: 2023-09-09

## 2023-09-08 RX ORDER — LEVETIRACETAM 250 MG/1
750 TABLET, FILM COATED ORAL EVERY 12 HOURS
Refills: 0 | Status: DISCONTINUED | OUTPATIENT
Start: 2023-09-08 | End: 2023-09-09

## 2023-09-08 RX ORDER — MIDODRINE HYDROCHLORIDE 2.5 MG/1
5 TABLET ORAL THREE TIMES A DAY
Refills: 0 | Status: DISCONTINUED | OUTPATIENT
Start: 2023-09-08 | End: 2023-09-09

## 2023-09-08 RX ORDER — MAGNESIUM SULFATE 500 MG/ML
1 VIAL (ML) INJECTION
Refills: 0 | Status: COMPLETED | OUTPATIENT
Start: 2023-09-08 | End: 2023-09-08

## 2023-09-08 RX ORDER — SODIUM CHLORIDE 9 MG/ML
1000 INJECTION, SOLUTION INTRAVENOUS
Refills: 0 | Status: DISCONTINUED | OUTPATIENT
Start: 2023-09-08 | End: 2023-09-09

## 2023-09-08 RX ADMIN — Medication 100 GRAM(S): at 13:23

## 2023-09-08 RX ADMIN — Medication 100 GRAM(S): at 11:24

## 2023-09-08 RX ADMIN — LEVETIRACETAM 400 MILLIGRAM(S): 250 TABLET, FILM COATED ORAL at 18:42

## 2023-09-08 RX ADMIN — SODIUM CHLORIDE 100 MILLILITER(S): 9 INJECTION, SOLUTION INTRAVENOUS at 10:31

## 2023-09-08 RX ADMIN — Medication 81 MILLIGRAM(S): at 11:23

## 2023-09-08 RX ADMIN — Medication 25 GRAM(S): at 09:21

## 2023-09-08 RX ADMIN — MORPHINE SULFATE 2 MILLIGRAM(S): 50 CAPSULE, EXTENDED RELEASE ORAL at 11:00

## 2023-09-08 RX ADMIN — MIDODRINE HYDROCHLORIDE 5 MILLIGRAM(S): 2.5 TABLET ORAL at 11:06

## 2023-09-08 RX ADMIN — SODIUM CHLORIDE 100 MILLILITER(S): 9 INJECTION INTRAMUSCULAR; INTRAVENOUS; SUBCUTANEOUS at 06:32

## 2023-09-08 RX ADMIN — Medication 66 MICROGRAM(S): at 23:09

## 2023-09-08 RX ADMIN — MORPHINE SULFATE 2 MILLIGRAM(S): 50 CAPSULE, EXTENDED RELEASE ORAL at 10:31

## 2023-09-08 RX ADMIN — ONDANSETRON 4 MILLIGRAM(S): 8 TABLET, FILM COATED ORAL at 09:09

## 2023-09-08 NOTE — PROGRESS NOTE ADULT - SUBJECTIVE AND OBJECTIVE BOX
Patient is seen and examined at the bed side, is afebrile but WBC count is trending up.  The kidney function has improved significantly. The CXR from 9/8 shows no active disease.       REVIEW OF SYSTEMS: All other review systems are negative      ALLERGIES: Zosyn ( Swelling of lips and itching)       Vital Signs Last 24 Hrs  T(C): 36.3 (08 Sep 2023 13:30), Max: 36.7 (08 Sep 2023 05:04)  T(F): 97.4 (08 Sep 2023 13:30), Max: 98 (08 Sep 2023 05:04)  HR: 85 (08 Sep 2023 13:30) (85 - 105)  BP: 149/97 (08 Sep 2023 13:30) (93/63 - 149/97)  BP(mean): --  RR: 20 (08 Sep 2023 13:30) (17 - 20)  SpO2: 95% (08 Sep 2023 13:30) (93% - 97%)    Parameters below as of 08 Sep 2023 13:30  Patient On (Oxygen Delivery Method): room air      PHYSICAL EXAM:  GENERAL: Not in distress   CHEST/LUNG: Not using accessory muscles  HEART: s1 and s2 present  ABDOMEN:  Nontender and incision site healed  EXTREMITIES: No pedal  edema  CNS: Awake and Alert      LABS:                        11.8   20.37 )-----------( 439      ( 08 Sep 2023 06:38 )             35.0                           12.4   16.65 )-----------( 514      ( 07 Sep 2023 08:05 )             37.2                  09-08    137  |  108  |  73<H>  ----------------------------<  108<H>  3.9   |  20<L>  |  1.21    Ca    7.6<L>      08 Sep 2023 06:38  Mg     1.4     09-08    TPro  5.2<L>  /  Alb  2.0<L>  /  TBili  0.4  /  DBili  x   /  AST  88<H>  /  ALT  99<H>  /  AlkPhos  132<H>  09-08 09-07    135  |  103  |  71<H>  ----------------------------<  114<H>  4.3   |  20<L>  |  1.83<H>    Ca    7.9<L>      07 Sep 2023 08:05    TPro  6.0  /  Alb  2.1<L>  /  TBili  0.4  /  DBili  x   /  AST  8<L>  /  ALT  23  /  AlkPhos  103  08-31        Urine Microscopic-Add On (NC) (09.01.23 @ 03:10)   Red Blood Cell - Urine: 0 /HPF  White Blood Cell - Urine: 4 /HPF  Bacteria: Many /HPF  Comment - Urine: few amorphous urates  Squamous Epithelial Cells: Present      MEDICATIONS  (STANDING):    apixaban 5 milliGRAM(s) Oral every 12 hours  aspirin  chewable 81 milliGRAM(s) Oral daily  dextrose 5% + sodium chloride 0.45%. 1000 milliLiter(s) (100 mL/Hr) IV Continuous <Continuous>  gabapentin 100 milliGRAM(s) Oral every 8 hours  levETIRAcetam  IVPB 750 milliGRAM(s) IV Intermittent every 12 hours  levoFLOXacin IVPB 250 milliGRAM(s) IV Intermittent every 24 hours  levothyroxine Injectable 66 MICROGram(s) IV Push at bedtime  melatonin 5 milliGRAM(s) Oral at bedtime  midodrine. 5 milliGRAM(s) Oral three times a day  pantoprazole    Tablet 40 milliGRAM(s) Oral before breakfast        RADIOLOGY & ADDITIONAL TESTS:    9/8/23: Xray Chest 1 View- PORTABLE-Routine (Xray Chest 1 View- PORTABLE-Routine .) (09.08.23 @ 10:39) IMPRESSION:   No radiographic evidence of active chest disease.      9/7/23: US Renal (09.07.23 @ 15:52) Limited study due to patient body habitus.  No hydronephrosis.      9/2/23 : Xray Abdomen 1 View (09.02.23 @ 10:24) Markedly distended small bowel loops throughout the abdomen   and pelvis is similar to recent CT. The CT demonstrates a status post colectomy and ileorectal anastomosis. Evaluation for free air limited on   this supine projection Subcutaneous emphysema noted laterally corresponding to recent CT abnormality.      9/1/23 : CT Abdomen and Pelvis w/ Oral Cont (09.01.23 @ 15:20) Status post colectomy with ileorectal anastomosis.    Fluid throughout non- obstructed bowel and rectum, consistent with enteritis.    Extensive subcutaneous emphysema in the anterior abdominal wall. While findings may be postoperative given the history of recent surgery, in the   appropriate clinical context, necrotizing infection may be considered. Careful clinical evaluation is therefore warranted.    These findings were discussed with Dr. MARQUITA MORFIN [phone] at  9/1/2023 3:43 PM with readback.      8/31/23 : Xray Chest 1 View- PORTABLE-Urgent (Xray Chest 1 View- PORTABLE-Urgent .) (08.31.23 @ 23:17) No acute pulmonary disease.        MICROBIOLOGY DATA:    Urine Microscopic-Add On (NC) (09.06.23 @ 10:40)   Red Blood Cell - Urine: 5 /HPF  White Blood Cell - Urine: 20 /HPF  Bacteria: Many /HPF  Squamous Epithelial Cells: many    Clostridium difficile Toxin by PCR (09.02.23 @ 05:16)   C Diff by PCR Result: NotDetec:     Culture - Stool (09.01.23 @ 03:30)   Specimen Source: .Stool Feces  Culture Results:   No enteric pathogens to date: Final culture pending    GI PCR Panel Stool (09.01.23 @ 03:30)   GI PCR Panel: NotDetec:     Urine Microscopic-Add On (NC) (09.01.23 @ 03:10)   Red Blood Cell - Urine: 0 /HPF  White Blood Cell - Urine: 4 /HPF  Bacteria: Many /HPF  Comment - Urine: few amorphous urates  Squamous Epithelial Cells: Present    Rapid HIV-1/2 Antibody (08.31.23 @ 21:30)   Rapid HIV-1/2 Antibody: Nonreact:                                           Patient is seen and examined at the bed side, is afebrile but WBC count is trending up.  The kidney function has improved significantly. The CXR from 9/8 shows no active disease.       REVIEW OF SYSTEMS: All other review systems are negative      ALLERGIES: Zosyn ( Swelling of lips and itching)       Vital Signs Last 24 Hrs  T(C): 36.3 (08 Sep 2023 13:30), Max: 36.7 (08 Sep 2023 05:04)  T(F): 97.4 (08 Sep 2023 13:30), Max: 98 (08 Sep 2023 05:04)  HR: 85 (08 Sep 2023 13:30) (85 - 105)  BP: 149/97 (08 Sep 2023 13:30) (93/63 - 149/97)  BP(mean): --  RR: 20 (08 Sep 2023 13:30) (17 - 20)  SpO2: 95% (08 Sep 2023 13:30) (93% - 97%)    Parameters below as of 08 Sep 2023 13:30  Patient On (Oxygen Delivery Method): room air      PHYSICAL EXAM:  GENERAL: Not in distress   CHEST/LUNG: Not using accessory muscles  HEART: s1 and s2 present  ABDOMEN:  Nontender and incision site healed  EXTREMITIES: No pedal  edema  CNS: Awake and Alert      LABS:                        11.8   20.37 )-----------( 439      ( 08 Sep 2023 06:38 )             35.0                           12.4   16.65 )-----------( 514      ( 07 Sep 2023 08:05 )             37.2                  09-08    137  |  108  |  73<H>  ----------------------------<  108<H>  3.9   |  20<L>  |  1.21    Ca    7.6<L>      08 Sep 2023 06:38  Mg     1.4     09-08    TPro  5.2<L>  /  Alb  2.0<L>  /  TBili  0.4  /  DBili  x   /  AST  88<H>  /  ALT  99<H>  /  AlkPhos  132<H>  09-08 09-07    135  |  103  |  71<H>  ----------------------------<  114<H>  4.3   |  20<L>  |  1.83<H>    Ca    7.9<L>      07 Sep 2023 08:05    TPro  6.0  /  Alb  2.1<L>  /  TBili  0.4  /  DBili  x   /  AST  8<L>  /  ALT  23  /  AlkPhos  103  08-31        Urine Microscopic-Add On (NC) (09.01.23 @ 03:10)   Red Blood Cell - Urine: 0 /HPF  White Blood Cell - Urine: 4 /HPF  Bacteria: Many /HPF  Comment - Urine: few amorphous urates  Squamous Epithelial Cells: Present      MEDICATIONS  (STANDING):    apixaban 5 milliGRAM(s) Oral every 12 hours  aspirin  chewable 81 milliGRAM(s) Oral daily  dextrose 5% + sodium chloride 0.45%. 1000 milliLiter(s) (100 mL/Hr) IV Continuous <Continuous>  gabapentin 100 milliGRAM(s) Oral every 8 hours  levETIRAcetam  IVPB 750 milliGRAM(s) IV Intermittent every 12 hours  levoFLOXacin IVPB 250 milliGRAM(s) IV Intermittent every 24 hours  levothyroxine Injectable 66 MICROGram(s) IV Push at bedtime  melatonin 5 milliGRAM(s) Oral at bedtime  midodrine. 5 milliGRAM(s) Oral three times a day  pantoprazole    Tablet 40 milliGRAM(s) Oral before breakfast        RADIOLOGY & ADDITIONAL TESTS:    9/8/23: Xray Chest 1 View- PORTABLE-Routine (Xray Chest 1 View- PORTABLE-Routine .) (09.08.23 @ 10:39) IMPRESSION:   No radiographic evidence of active chest disease.      9/7/23: US Renal (09.07.23 @ 15:52) Limited study due to patient body habitus.  No hydronephrosis.      9/2/23 : Xray Abdomen 1 View (09.02.23 @ 10:24) Markedly distended small bowel loops throughout the abdomen   and pelvis is similar to recent CT. The CT demonstrates a status post colectomy and ileorectal anastomosis. Evaluation for free air limited on   this supine projection Subcutaneous emphysema noted laterally corresponding to recent CT abnormality.      9/1/23 : CT Abdomen and Pelvis w/ Oral Cont (09.01.23 @ 15:20) Status post colectomy with ileorectal anastomosis.    Fluid throughout non- obstructed bowel and rectum, consistent with enteritis.    Extensive subcutaneous emphysema in the anterior abdominal wall. While findings may be postoperative given the history of recent surgery, in the   appropriate clinical context, necrotizing infection may be considered. Careful clinical evaluation is therefore warranted.    These findings were discussed with Dr. MARQUITA MORFIN [phone] at  9/1/2023 3:43 PM with readback.      8/31/23 : Xray Chest 1 View- PORTABLE-Urgent (Xray Chest 1 View- PORTABLE-Urgent .) (08.31.23 @ 23:17) No acute pulmonary disease.        MICROBIOLOGY DATA:    Urine Microscopic-Add On (NC) (09.06.23 @ 10:40)   Red Blood Cell - Urine: 5 /HPF  White Blood Cell - Urine: 20 /HPF  Bacteria: Many /HPF  Squamous Epithelial Cells: many    Clostridium difficile Toxin by PCR (09.02.23 @ 05:16)   C Diff by PCR Result: NotDetec:     Culture - Stool (09.01.23 @ 03:30)   Specimen Source: .Stool Feces  Culture Results:   No enteric pathogens to date: Final culture pending    GI PCR Panel Stool (09.01.23 @ 03:30)   GI PCR Panel: NotDetec:     Urine Microscopic-Add On (NC) (09.01.23 @ 03:10)   Red Blood Cell - Urine: 0 /HPF  White Blood Cell - Urine: 4 /HPF  Bacteria: Many /HPF  Comment - Urine: few amorphous urates  Squamous Epithelial Cells: Present    Rapid HIV-1/2 Antibody (08.31.23 @ 21:30)   Rapid HIV-1/2 Antibody: Nonreact:                                           Patient is seen and examined at the bed side, is afebrile but WBC count is trending up.  The kidney function has improved significantly. The CXR from 9/8 shows no active disease.       REVIEW OF SYSTEMS: All other review systems are negative      ALLERGIES: Zosyn ( Swelling of lips and itching)       Vital Signs Last 24 Hrs  T(C): 36.3 (08 Sep 2023 13:30), Max: 36.7 (08 Sep 2023 05:04)  T(F): 97.4 (08 Sep 2023 13:30), Max: 98 (08 Sep 2023 05:04)  HR: 85 (08 Sep 2023 13:30) (85 - 105)  BP: 149/97 (08 Sep 2023 13:30) (93/63 - 149/97)  BP(mean): --  RR: 20 (08 Sep 2023 13:30) (17 - 20)  SpO2: 95% (08 Sep 2023 13:30) (93% - 97%)    Parameters below as of 08 Sep 2023 13:30  Patient On (Oxygen Delivery Method): room air      PHYSICAL EXAM:  GENERAL: Not in distress   CHEST/LUNG: Not using accessory muscles  HEART: s1 and s2 present  ABDOMEN:  Nontender and incision site healed  EXTREMITIES: No pedal  edema  CNS: Awake and Alert      LABS:                        11.8   20.37 )-----------( 439      ( 08 Sep 2023 06:38 )             35.0                           12.4   16.65 )-----------( 514      ( 07 Sep 2023 08:05 )             37.2                  09-08    137  |  108  |  73<H>  ----------------------------<  108<H>  3.9   |  20<L>  |  1.21    Ca    7.6<L>      08 Sep 2023 06:38  Mg     1.4     09-08    TPro  5.2<L>  /  Alb  2.0<L>  /  TBili  0.4  /  DBili  x   /  AST  88<H>  /  ALT  99<H>  /  AlkPhos  132<H>  09-08 09-07    135  |  103  |  71<H>  ----------------------------<  114<H>  4.3   |  20<L>  |  1.83<H>    Ca    7.9<L>      07 Sep 2023 08:05    TPro  6.0  /  Alb  2.1<L>  /  TBili  0.4  /  DBili  x   /  AST  8<L>  /  ALT  23  /  AlkPhos  103  08-31        Urine Microscopic-Add On (NC) (09.01.23 @ 03:10)   Red Blood Cell - Urine: 0 /HPF  White Blood Cell - Urine: 4 /HPF  Bacteria: Many /HPF  Comment - Urine: few amorphous urates  Squamous Epithelial Cells: Present      MEDICATIONS  (STANDING):    apixaban 5 milliGRAM(s) Oral every 12 hours  aspirin  chewable 81 milliGRAM(s) Oral daily  dextrose 5% + sodium chloride 0.45%. 1000 milliLiter(s) (100 mL/Hr) IV Continuous <Continuous>  gabapentin 100 milliGRAM(s) Oral every 8 hours  levETIRAcetam  IVPB 750 milliGRAM(s) IV Intermittent every 12 hours  levoFLOXacin IVPB 250 milliGRAM(s) IV Intermittent every 24 hours  levothyroxine Injectable 66 MICROGram(s) IV Push at bedtime  melatonin 5 milliGRAM(s) Oral at bedtime  midodrine. 5 milliGRAM(s) Oral three times a day  pantoprazole    Tablet 40 milliGRAM(s) Oral before breakfast        RADIOLOGY & ADDITIONAL TESTS:    9/8/23: Xray Chest 1 View- PORTABLE-Routine (Xray Chest 1 View- PORTABLE-Routine .) (09.08.23 @ 10:39) IMPRESSION:   No radiographic evidence of active chest disease.      9/7/23: US Renal (09.07.23 @ 15:52) Limited study due to patient body habitus.  No hydronephrosis.      9/2/23 : Xray Abdomen 1 View (09.02.23 @ 10:24) Markedly distended small bowel loops throughout the abdomen   and pelvis is similar to recent CT. The CT demonstrates a status post colectomy and ileorectal anastomosis. Evaluation for free air limited on   this supine projection Subcutaneous emphysema noted laterally corresponding to recent CT abnormality.      9/1/23 : CT Abdomen and Pelvis w/ Oral Cont (09.01.23 @ 15:20) Status post colectomy with ileorectal anastomosis.    Fluid throughout non- obstructed bowel and rectum, consistent with enteritis.    Extensive subcutaneous emphysema in the anterior abdominal wall. While findings may be postoperative given the history of recent surgery, in the   appropriate clinical context, necrotizing infection may be considered. Careful clinical evaluation is therefore warranted.    These findings were discussed with Dr. MARQUITA MORFIN [phone] at  9/1/2023 3:43 PM with readback.      8/31/23 : Xray Chest 1 View- PORTABLE-Urgent (Xray Chest 1 View- PORTABLE-Urgent .) (08.31.23 @ 23:17) No acute pulmonary disease.        MICROBIOLOGY DATA:    Urine Microscopic-Add On (NC) (09.06.23 @ 10:40)   Red Blood Cell - Urine: 5 /HPF  White Blood Cell - Urine: 20 /HPF  Bacteria: Many /HPF  Squamous Epithelial Cells: many    Clostridium difficile Toxin by PCR (09.02.23 @ 05:16)   C Diff by PCR Result: NotDetec:     Culture - Stool (09.01.23 @ 03:30)   Specimen Source: .Stool Feces  Culture Results:   No enteric pathogens to date: Final culture pending    GI PCR Panel Stool (09.01.23 @ 03:30)   GI PCR Panel: NotDetec:     Urine Microscopic-Add On (NC) (09.01.23 @ 03:10)   Red Blood Cell - Urine: 0 /HPF  White Blood Cell - Urine: 4 /HPF  Bacteria: Many /HPF  Comment - Urine: few amorphous urates  Squamous Epithelial Cells: Present    Rapid HIV-1/2 Antibody (08.31.23 @ 21:30)   Rapid HIV-1/2 Antibody: Nonreact:

## 2023-09-08 NOTE — PROGRESS NOTE ADULT - PROBLEM SELECTOR PLAN 1
P/w diarrhea associated with weakness since early August s/p colectomy surgery.  CT A/P as above  chronic diarrhea  GI PCR, ova and parasite, and stool culture negative  NPO except for meds  Continue IVF: change to D 1/2 NS at 100ml/hr  Continue antiemetic  Continue PPI  F/u abdominal xray  GI Dr. Sweeney following   ID Dr. Monreal following P/w diarrhea associated with weakness since early August s/p colectomy surgery.  CT A/P as above  chronic diarrhea  GI PCR, ova and parasite, and stool culture negative  NPO except for meds  Continue IVF: change to D 1/2 NS at 100ml/hr  Continue antiemetic  Continue PPI  F/u abdominal xray  F/u BC  GI Dr. Sweeney following   ID Dr. Monreal following

## 2023-09-08 NOTE — PROGRESS NOTE ADULT - PROBLEM SELECTOR PLAN 12
Pt from [facility]  F/u BC  F/u AXR  Monitor LFTs Pt from [facility]  F/u BC  F/u AXR  Monitor LFTs Pt from [facility]  F/u BC  F/u AXR  Monitor LFTs

## 2023-09-08 NOTE — PROGRESS NOTE ADULT - PROBLEM SELECTOR PLAN 2
UA (+)   UC 9/1 growing  Enterobacter cloacae complex  s/p Zosyn and Linezolid  Repeat UA (+) on 9/6  Leukocytosis uptrending  Afebrile   Continue levaquin IV daily   ID Dr. Monreal following

## 2023-09-08 NOTE — PROGRESS NOTE ADULT - SUBJECTIVE AND OBJECTIVE BOX
NP Note discussed with primary attending.      Patient is a 78y old  Female who presents with a chief complaint of enteritis/UTI (07 Sep 2023 12:23)      INTERVAL HPI/OVERNIGHT EVENTS:     MEDICATIONS  (STANDING):  apixaban 5 milliGRAM(s) Oral every 12 hours  aspirin  chewable 81 milliGRAM(s) Oral daily  dextrose 5% + sodium chloride 0.45%. 1000 milliLiter(s) (100 mL/Hr) IV Continuous <Continuous>  gabapentin 100 milliGRAM(s) Oral every 8 hours  levETIRAcetam  IVPB 750 milliGRAM(s) IV Intermittent every 12 hours  levoFLOXacin IVPB 250 milliGRAM(s) IV Intermittent every 24 hours  levothyroxine Injectable 66 MICROGram(s) IV Push at bedtime  magnesium sulfate  IVPB 1 Gram(s) IV Intermittent every 1 hour  melatonin 5 milliGRAM(s) Oral at bedtime  midodrine. 5 milliGRAM(s) Oral three times a day  pantoprazole    Tablet 40 milliGRAM(s) Oral before breakfast    MEDICATIONS  (PRN):  acetaminophen     Tablet .. 650 milliGRAM(s) Oral every 6 hours PRN Temp greater or equal to 38C (100.4F), Mild Pain (1 - 3)  albuterol    90 MICROgram(s) HFA Inhaler 2 Puff(s) Inhalation every 6 hours PRN Shortness of Breath and/or Wheezing  ALPRAZolam 0.25 milliGRAM(s) Oral two times a day PRN Anxiety/insomnia  metoclopramide Injectable 10 milliGRAM(s) IV Push every 8 hours PRN nausea, vomiting  ondansetron Injectable 4 milliGRAM(s) IV Push every 6 hours PRN Nausea and/or Vomiting  simethicone 80 milliGRAM(s) Chew four times a day PRN Gas      __________________________________________________  REVIEW OF SYSTEMS:    CONSTITUTIONAL: No fever,   EYES: no acute visual disturbances  NECK: No pain or stiffness  RESPIRATORY: No cough; No shortness of breath  CARDIOVASCULAR: No chest pain, no palpitations  GASTROINTESTINAL: No pain. No nausea or vomiting;(+) diarrhea   NEUROLOGICAL: No headache or numbness, no tremors  MUSCULOSKELETAL: bilat feet /heel pain.   GENITOURINARY: no dysuria, no frequency, no hesitancy  PSYCHIATRY: no depression , no anxiety  ALL OTHER  ROS negative        Vital Signs Last 24 Hrs  T(C): 36.7 (08 Sep 2023 05:04), Max: 36.7 (08 Sep 2023 05:04)  T(F): 98 (08 Sep 2023 05:04), Max: 98 (08 Sep 2023 05:04)  HR: 99 (08 Sep 2023 05:04) (97 - 105)  BP: 93/63 (08 Sep 2023 05:04) (93/63 - 111/77)  BP(mean): --  RR: 18 (08 Sep 2023 05:04) (16 - 18)  SpO2: 97% (08 Sep 2023 05:04) (93% - 97%)    Parameters below as of 08 Sep 2023 05:04  Patient On (Oxygen Delivery Method): room air        ________________________________________________  PHYSICAL EXAM:  GENERAL: NAD  HEENT: Normocephalic;  conjunctivae and sclerae clear; moist mucous membranes;   NECK : supple  CHEST/LUNG: Clear to auscultation bilaterally with good air entry   HEART: S1 S2  regular; no murmurs, gallops or rubs  ABDOMEN: Soft, Nontender, Nondistended; Bowel sounds present  EXTREMITIES: no cyanosis; no edema; no calf tenderness  SKIN: warm and dry; no rash  NERVOUS SYSTEM:  Awake and alert; no new deficits    _________________________________________________  LABS:                        11.8   20.37 )-----------( 439      ( 08 Sep 2023 06:38 )             35.0     09-08    137  |  108  |  73<H>  ----------------------------<  108<H>  3.9   |  20<L>  |  1.21    Ca    7.6<L>      08 Sep 2023 06:38  Mg     1.4     09-08    TPro  5.2<L>  /  Alb  2.0<L>  /  TBili  0.4  /  DBili  x   /  AST  88<H>  /  ALT  99<H>  /  AlkPhos  132<H>  09-08      Urinalysis Basic - ( 08 Sep 2023 06:38 )    Color: x / Appearance: x / SG: x / pH: x  Gluc: 108 mg/dL / Ketone: x  / Bili: x / Urobili: x   Blood: x / Protein: x / Nitrite: x   Leuk Esterase: x / RBC: x / WBC x   Sq Epi: x / Non Sq Epi: x / Bacteria: x      CAPILLARY BLOOD GLUCOSE      POCT Blood Glucose.: 147 mg/dL (07 Sep 2023 11:23)        RADIOLOGY & ADDITIONAL TESTS:        Consultant(s) Notes Reviewed:   YES/ No    Care Discussed with Consultants :     Plan of care was discussed with patient and /or primary care giver; all questions and concerns were addressed and care was aligned with patient's wishes.     NP Note discussed with primary attending.      Patient is a 78y old  Female who presents with a chief complaint of enteritis/UTI (07 Sep 2023 12:23)      INTERVAL HPI/OVERNIGHT EVENTS: WBC uptrending. LFTs elevated.   Pt seen and examined this morning. Pt awake/alert, endorses having multiple diarrhea, pain to both heels due to spurs . Pt denies SOB, chest discomfort, N/V/abdominal discomfort. RN reports pt vomited last night and this morning.       MEDICATIONS  (STANDING):  apixaban 5 milliGRAM(s) Oral every 12 hours  aspirin  chewable 81 milliGRAM(s) Oral daily  dextrose 5% + sodium chloride 0.45%. 1000 milliLiter(s) (100 mL/Hr) IV Continuous <Continuous>  gabapentin 100 milliGRAM(s) Oral every 8 hours  levETIRAcetam  IVPB 750 milliGRAM(s) IV Intermittent every 12 hours  levoFLOXacin IVPB 250 milliGRAM(s) IV Intermittent every 24 hours  levothyroxine Injectable 66 MICROGram(s) IV Push at bedtime  magnesium sulfate  IVPB 1 Gram(s) IV Intermittent every 1 hour  melatonin 5 milliGRAM(s) Oral at bedtime  midodrine. 5 milliGRAM(s) Oral three times a day  pantoprazole    Tablet 40 milliGRAM(s) Oral before breakfast    MEDICATIONS  (PRN):  acetaminophen     Tablet .. 650 milliGRAM(s) Oral every 6 hours PRN Temp greater or equal to 38C (100.4F), Mild Pain (1 - 3)  albuterol    90 MICROgram(s) HFA Inhaler 2 Puff(s) Inhalation every 6 hours PRN Shortness of Breath and/or Wheezing  ALPRAZolam 0.25 milliGRAM(s) Oral two times a day PRN Anxiety/insomnia  metoclopramide Injectable 10 milliGRAM(s) IV Push every 8 hours PRN nausea, vomiting  ondansetron Injectable 4 milliGRAM(s) IV Push every 6 hours PRN Nausea and/or Vomiting  simethicone 80 milliGRAM(s) Chew four times a day PRN Gas      __________________________________________________  REVIEW OF SYSTEMS:    CONSTITUTIONAL: No fever,   EYES: no acute visual disturbances  NECK: No pain or stiffness  RESPIRATORY: No cough; No shortness of breath  CARDIOVASCULAR: No chest pain, no palpitations  GASTROINTESTINAL: No pain. No nausea or vomiting;(+) diarrhea   NEUROLOGICAL: No headache or numbness, no tremors  MUSCULOSKELETAL: bilat feet /heel pain.   GENITOURINARY: no dysuria, no frequency, no hesitancy  PSYCHIATRY: no depression , no anxiety  ALL OTHER  ROS negative        Vital Signs Last 24 Hrs  T(C): 36.7 (08 Sep 2023 05:04), Max: 36.7 (08 Sep 2023 05:04)  T(F): 98 (08 Sep 2023 05:04), Max: 98 (08 Sep 2023 05:04)  HR: 99 (08 Sep 2023 05:04) (97 - 105)  BP: 93/63 (08 Sep 2023 05:04) (93/63 - 111/77)  BP(mean): --  RR: 18 (08 Sep 2023 05:04) (16 - 18)  SpO2: 97% (08 Sep 2023 05:04) (93% - 97%)    Parameters below as of 08 Sep 2023 05:04  Patient On (Oxygen Delivery Method): room air        ________________________________________________  PHYSICAL EXAM:  GENERAL: NAD  HEENT: Normocephalic;  conjunctivae and sclerae clear; moist mucous membranes;   NECK : supple  CHEST/LUNG: Clear to auscultation bilaterally with good air entry   HEART: S1 S2  regular; no murmurs, gallops or rubs  ABDOMEN: Soft, Nontender, Nondistended; Bowel sounds present  EXTREMITIES: no cyanosis; no edema; no calf tenderness  SKIN: warm and dry;   NERVOUS SYSTEM:  Awake and alert; no new deficits    _________________________________________________  LABS:                        11.8   20.37 )-----------( 439      ( 08 Sep 2023 06:38 )             35.0     09-08    137  |  108  |  73<H>  ----------------------------<  108<H>  3.9   |  20<L>  |  1.21    Ca    7.6<L>      08 Sep 2023 06:38  Mg     1.4     09-08    TPro  5.2<L>  /  Alb  2.0<L>  /  TBili  0.4  /  DBili  x   /  AST  88<H>  /  ALT  99<H>  /  AlkPhos  132<H>  09-08      Urinalysis Basic - ( 08 Sep 2023 06:38 )    Color: x / Appearance: x / SG: x / pH: x  Gluc: 108 mg/dL / Ketone: x  / Bili: x / Urobili: x   Blood: x / Protein: x / Nitrite: x   Leuk Esterase: x / RBC: x / WBC x   Sq Epi: x / Non Sq Epi: x / Bacteria: x      CAPILLARY BLOOD GLUCOSE      POCT Blood Glucose.: 147 mg/dL (07 Sep 2023 11:23)      RADIOLOGY & ADDITIONAL TESTS:  < from: Xray Chest 1 View- PORTABLE-Routine (Xray Chest 1 View- PORTABLE-Routine .) (09.08.23 @ 10:39) >  IMPRESSION:   No radiographic evidence of active chest disease.    < end of copied text >  < from: US Renal (09.07.23 @ 15:52) >  IMPRESSION:  Limited study due to patient body habitus.    No hydronephrosis.    < end of copied text >  < from: Xray Abdomen 1 View PORTABLE -Urgent (Xray Abdomen 1 View PORTABLE -Urgent .) (09.03.23 @ 11:16) >  IMPRESSION:  Markedly dilated loops of small bowel are again identified.    < end of copied text >  < from: Xray Abdomen 1 View (09.02.23 @ 10:24) >  IMPRESSION: Markedly distended small bowel loops throughout the abdomen   and pelvis is similar to recent CT. The CT demonstrates a status post   colectomy and ileorectal anastomosis. Evaluation for free air limited on   this supine projection Subcutaneous emphysema noted laterally   corresponding to recent CT abnormality.    < end of copied text >  < from: CT Abdomen and Pelvis w/ Oral Cont (09.01.23 @ 15:20) >  IMPRESSION:  Status post colectomy with ileorectal anastomosis.    Fluid throughout nonobstructed bowel and rectum, consistent with   enteritis.    Extensive subcutaneous emphysema in the anterior abdominal wall. While   findings may be postoperative given the history of recent surgery, in the   appropriate clinical context, necrotizing infection may be considered.   Careful clinical evaluation is therefore warranted.    < end of copied text >  < from: Xray Chest 1 View- PORTABLE-Urgent (Xray Chest 1 View- PORTABLE-Urgent .) (08.31.23 @ 23:17) >  Impression:    No acute pulmonary disease.    < end of copied text >        Consultant(s) Notes Reviewed:   YES    Care Discussed with Consultants :     Plan of care was discussed with patient and /or primary care giver; all questions and concerns were addressed and care was aligned with patient's wishes.

## 2023-09-08 NOTE — PROGRESS NOTE ADULT - PROBLEM SELECTOR PLAN 3
LFts elevated   likely in setting of hypotension vs DILI  Hold statin   Trend LFT's  Start Midodrine for hypotension

## 2023-09-08 NOTE — PROGRESS NOTE ADULT - SUBJECTIVE AND OBJECTIVE BOX
Date of Service 09-08-23 @ 11:13    CHIEF COMPLAINT:Patient is a 78y old  Female who presents with a chief complaint of enteritis/UTI.Pt had vomiting and diarrhea .    	  REVIEW OF SYSTEMS:  CONSTITUTIONAL: No fever, weight loss, or fatigue  EYES: No eye pain, visual disturbances, or discharge  ENT:  No difficulty hearing, tinnitus, vertigo; No sinus or throat pain  NECK: No pain or stiffness  RESPIRATORY: No cough, wheezing, chills or hemoptysis; No Shortness of Breath  CARDIOVASCULAR: No chest pain, palpitations, passing out, dizziness, or leg swelling  GASTROINTESTINAL: No abdominal or epigastric pain. + nausea, vomiting, No hematemesis; + diarrhea no constipation. No melena or hematochezia.  GENITOURINARY: No dysuria, frequency, hematuria, or incontinence  NEUROLOGICAL: No headaches, memory loss, loss of strength, numbness, or tremors  SKIN: No itching, burning, rashes, or lesions   LYMPH Nodes: No enlarged glands  ENDOCRINE: No heat or cold intolerance; No hair loss  MUSCULOSKELETAL: No joint pain or swelling; No muscle, back, or extremity pain  PSYCHIATRIC: No depression, anxiety, mood swings, or difficulty sleeping  HEME/LYMPH: No easy bruising, or bleeding gums  ALLERGY AND IMMUNOLOGIC: No hives or eczema	      PHYSICAL EXAM:  T(C): 36.7 (09-08-23 @ 05:04), Max: 36.7 (09-08-23 @ 05:04)  HR: 99 (09-08-23 @ 05:04) (97 - 105)  BP: 93/63 (09-08-23 @ 05:04) (93/63 - 111/77)  RR: 18 (09-08-23 @ 05:04) (16 - 18)  SpO2: 97% (09-08-23 @ 05:04) (93% - 97%)  Wt(kg): --  I&O's Summary      Appearance: Normal	  HEENT:   Normal oral mucosa, PERRL, EOMI	  Lymphatic: No lymphadenopathy  Cardiovascular: Normal S1 S2, No JVD, No murmurs, No edema  Respiratory: Lungs clear to auscultation	  Psychiatry: A & O x 3, Mood & affect appropriate  Gastrointestinal:  Soft, Non-tender, + BS	  Skin: No rashes, No ecchymoses, No cyanosis	  Neurologic: Non-focal  Extremities: Normal range of motion, No clubbing, cyanosis or edema  Vascular: Peripheral pulses palpable 2+ bilaterally    MEDICATIONS  (STANDING):  apixaban 5 milliGRAM(s) Oral every 12 hours  aspirin  chewable 81 milliGRAM(s) Oral daily  dextrose 5% + sodium chloride 0.45%. 1000 milliLiter(s) (100 mL/Hr) IV Continuous <Continuous>  gabapentin 100 milliGRAM(s) Oral every 8 hours  levETIRAcetam  IVPB 750 milliGRAM(s) IV Intermittent every 12 hours  levoFLOXacin IVPB 250 milliGRAM(s) IV Intermittent every 24 hours  levothyroxine Injectable 66 MICROGram(s) IV Push at bedtime  magnesium sulfate  IVPB 1 Gram(s) IV Intermittent every 1 hour  melatonin 5 milliGRAM(s) Oral at bedtime  midodrine. 5 milliGRAM(s) Oral three times a day  pantoprazole    Tablet 40 milliGRAM(s) Oral before breakfast        LABS:	 	                      11.8   20.37 )-----------( 439      ( 08 Sep 2023 06:38 )             35.0     09-08    137  |  108  |  73<H>  ----------------------------<  108<H>  3.9   |  20<L>  |  1.21    Ca    7.6<L>      08 Sep 2023 06:38  Mg     1.4     09-08    TPro  5.2<L>  /  Alb  2.0<L>  /  TBili  0.4  /  DBili  x   /  AST  88<H>  /  ALT  99<H>  /  AlkPhos  132<H>  09-08    proBNP:   Lipid Profile: Cholesterol 87  LDL --  HDL 55  TG 74  Ldl calc 17  Ratio --    HgA1c:   TSH: Thyroid Stimulating Hormone, Serum: 0.84 uU/mL (09-02 @ 06:58)

## 2023-09-08 NOTE — PROGRESS NOTE ADULT - ASSESSMENT
Patient is a 79 y/o F with PMH transverse colon neoplasm s/p colectomy in [facility] 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 Patient  p/w weakness, lethargy, poor appetite, light headedness  and diarrhea.  Patient reports when her blood pressure was checked in the facility it was in the low 80s. CT A/P showed Status post colectomy with ileorectal anastomosis.Fluid throughout nonobstructed bowel and rectum, consistent with enteritis.  Extensive subcutaneous emphysema in the anterior abdominal wall. While   findings may be postoperative given the history of recent surgery, in the appropriate clinical context, necrotizing infection may be considered.   Careful clinical evaluation is therefore warranted.  Pt. admitted for enteritis vs. necrotizing fascitis in setting of recent  robotic surgery at [facility].  Pt. followed by surgery, no interventions indicated at this time.  ID Dr. Monreal following, no antibiotics indicated at this time.  Diet advanced to full liquid.   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 Patient  p/w weakness, lethargy, poor appetite, light headedness  and diarrhea.  Patient reports when her blood pressure was checked in the facility it was in the low 80s. CT A/P showed Status post colectomy with ileorectal anastomosis.Fluid throughout nonobstructed bowel and rectum, consistent with enteritis.  Extensive subcutaneous emphysema in the anterior abdominal wall. While   findings may be postoperative given the history of recent surgery, in the appropriate clinical context, necrotizing infection may be considered.   Careful clinical evaluation is therefore warranted.  Pt. admitted for enteritis vs. necrotizing fascitis in setting of recent  robotic surgery at [facility].  Pt. followed by surgery, no interventions indicated at this time.  ID Dr. Monreal following, no antibiotics indicated at this time.  Diet advanced to full liquid.   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 Patient  p/w weakness, lethargy, poor appetite, light headedness  and diarrhea.  Patient reports when her blood pressure was checked in the facility it was in the low 80s. CT A/P showed Status post colectomy with ileorectal anastomosis.Fluid throughout nonobstructed bowel and rectum, consistent with enteritis.  Extensive subcutaneous emphysema in the anterior abdominal wall. While   findings may be postoperative given the history of recent surgery, in the appropriate clinical context, necrotizing infection may be considered.   Careful clinical evaluation is therefore warranted.  Pt. admitted for enteritis vs. necrotizing fascitis in setting of recent  robotic surgery at [facility].  Pt. followed by surgery, no interventions indicated at this time.  ID Dr. Monreal following, no antibiotics indicated at this time.  Diet advanced to full liquid.

## 2023-09-08 NOTE — CHART NOTE - NSCHARTNOTEFT_GEN_A_CORE
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 Patient  p/w weakness, lethargy, poor appetite, light headed and diarrhea.     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 Patient  p/w weakness, lethargy, poor appetite, light headed and diarrhea.     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 Patient  p/w weakness, lethargy, poor appetite, light headed and diarrhea.     Vital Signs Last 24 Hrs  T(C): 36.7 (08 Sep 2023 05:04), Max: 36.7 (08 Sep 2023 05:04)  T(F): 98 (08 Sep 2023 05:04), Max: 98 (08 Sep 2023 05:04)  HR: 99 (08 Sep 2023 05:04) (97 - 105)  BP: 93/63 (08 Sep 2023 05:04) (87/61 - 111/77)  BP(mean): --  RR: 18 (08 Sep 2023 05:04) (16 - 18)  SpO2: 97% (08 Sep 2023 05:04) (93% - 97%)    Parameters below as of 08 Sep 2023 05:04  Patient On (Oxygen Delivery Method): room air    NEURO: Alert, oriented X3  RESP: Even, unlabored    PLAN  Keppra changed to IV admin

## 2023-09-08 NOTE — PROGRESS NOTE ADULT - SUBJECTIVE AND OBJECTIVE BOX
[   ] ICU                                          [   ] CCU                                      [ X  ] Medical Floor    Patient is a 78 year old female with persistent diarrhea. GI consulted to evaluate.         Patient is a 78 year old female with past medical history significant for transverse colon neoplasm s/p colectomy in [facility] in early August 2023, HTN, HLD, CAD s/p LHC with TALI to RCA, Atrial fibrillation s/p watchman implant (3/23), hypothyroidism, PUJA, and TIA presented to the emergency room with 2 days history of lightheaded and feels lethargic. Patient reports when her blood pressure was checked in the facility it was in the low 80s. Patient reports that since her surgery earlier in August she is not eating much at all. She reports that since the surgery she has had diarrhea. Patient reports she used to have 5-6 episodes of loose watery bowel movements every day and now have improved to 2-3 bowel movements daily. Patient reports due to fear of worsening diarrhea she has decreased diet and oral intake. Patient reports she only takes soup and anything heavy makes her diarrhea worse associated with intermittent, diffuse, non radiating, 3/10 intensity, crampy abdominal pain. . Patient denies nausea, vomiting, hematemesis, hematochezia, melena, fever, chills, chest pain, SOB, palpitation, cough, hematuria, dysuria, recent traveling.        Patient is comfortable. No new complaints reported, No abdominal pain, N/V, hematemesis, hematochezia, melena, fever, chills, chest pain, SOB, cough or diarrhea reported.      PAIN MANAGEMENT:  Pain Scale:                 3/10  Pain Location:  Diffuse crampy abdominal pain       PAST MEDICAL HISTORY    Atrial fibrillation    Hyperlipidemia    Hypertension    CAD (coronary artery disease)    Colon cancer    Hypothyroidism    Obstructive sleep apnea    Transient ischemic attack (TIA)             PAST SURGICAL HISTORY     Colectomy        Allergies    No Known Allergies    Intolerances  None       SOCIAL HISTORY  Advanced Directives:       [ X ] Full Code       [  ] DNR  Marital Status:         [  ] M      [ X ] S      [  ] D       [  ] W  Children:       [ X ] Yes      [  ] No  Occupation:        [  ] Employed       [ X ] Unemployed       [  ] Retired  Diet:       [ X ] Regular       [  ] PEG feeding          [  ] NG tube feeding  Drug Use:           [  ] Patient denied          [  ] Yes  Alcohol:           [ X ] No             [  ] Yes (socially)         [  ] Yes (chronic)  Tobacco:           [  ] Yes           [ X ] No      FAMILY HISTORY  [ X ] Heart Disease            [ X ] Diabetes             [ X ] HTN             [  ] Colon Cancer             [  ] Stomach Cancer              [  ] Pancreatic Cancer        VITALS  Vital Signs Last 24 Hrs  T(C): 36.3 (08 Sep 2023 13:30), Max: 36.7 (08 Sep 2023 05:04)  T(F): 97.4 (08 Sep 2023 13:30), Max: 98 (08 Sep 2023 05:04)  HR: 85 (08 Sep 2023 13:30) (85 - 105)  BP: 149/97 (08 Sep 2023 13:30) (93/63 - 149/97)     RR: 20 (08 Sep 2023 13:30) (17 - 20)  SpO2: 95% (08 Sep 2023 13:30) (93% - 97%)    Parameters below as of 08 Sep 2023 13:30  Patient On (Oxygen Delivery Method): room air       MEDICATIONS  (STANDING):  apixaban 5 milliGRAM(s) Oral every 12 hours  aspirin  chewable 81 milliGRAM(s) Oral daily  dextrose 5% + sodium chloride 0.45%. 1000 milliLiter(s) (100 mL/Hr) IV Continuous <Continuous>  gabapentin 100 milliGRAM(s) Oral every 8 hours  levETIRAcetam  IVPB 750 milliGRAM(s) IV Intermittent every 12 hours  levoFLOXacin IVPB 250 milliGRAM(s) IV Intermittent every 24 hours  levothyroxine Injectable 66 MICROGram(s) IV Push at bedtime  melatonin 5 milliGRAM(s) Oral at bedtime  midodrine. 5 milliGRAM(s) Oral three times a day  pantoprazole    Tablet 40 milliGRAM(s) Oral before breakfast    MEDICATIONS  (PRN):  acetaminophen     Tablet .. 650 milliGRAM(s) Oral every 6 hours PRN Temp greater or equal to 38C (100.4F), Mild Pain (1 - 3)  albuterol    90 MICROgram(s) HFA Inhaler 2 Puff(s) Inhalation every 6 hours PRN Shortness of Breath and/or Wheezing  ALPRAZolam 0.25 milliGRAM(s) Oral two times a day PRN Anxiety/insomnia  metoclopramide Injectable 10 milliGRAM(s) IV Push every 8 hours PRN nausea, vomiting  ondansetron Injectable 4 milliGRAM(s) IV Push every 6 hours PRN Nausea and/or Vomiting  simethicone 80 milliGRAM(s) Chew four times a day PRN Gas                            11.8   20.37 )-----------( 439      ( 08 Sep 2023 06:38 )             35.0       09-08    137  |  108  |  73<H>  ----------------------------<  108<H>  3.9   |  20<L>  |  1.21    Ca    7.6<L>      08 Sep 2023 06:38  Mg     1.4     09-08    TPro  5.2<L>  /  Alb  2.0<L>  /  TBili  0.4  /  DBili  x   /  AST  88<H>  /  ALT  99<H>  /  AlkPhos  132<H>  09-08

## 2023-09-08 NOTE — PROGRESS NOTE ADULT - ASSESSMENT
Patient is a 78y old  Female with PMH of transverse colon neoplasm s/p colectomy in [facility] in early August, HTN, HLD, CAD s/p LHC with TALI to RCA, Atrial fibrillation s/p watchman implant (3/23), hypothyroidism, PUJA (not on cpap), TIA, now presents to the ER for evaluation of weak, tired, lightheaded and low blood pressure, in the low 80s. Patient reports that since her surgery earlier in August she is not eating much at all and  has had diarrhea. She used to have 5-6 episodes of loose watery bowel movements every day and now have improved to 2-3 bowel movements daily. She has no fever or chills. ON Admission, she found to have no fever but Low Blood pressure, BP of 93/62. The stool studies are in process. The ID consult requested to assist with further evaluation and antibiotic management.     # Hypotension - Another episode today, 9/7/23- responded to IVF resuscitation  # R/O Infectious etiology of diarrhea- C.difficile PCR and GI pathogen PCR is negative   #Enteritis and Necrotizing  fasciitis - CT abd/pelvis shows " Extensive subcutaneous emphysema in the anterior abdominal wall. While findings may be postoperative given the history of recent surgery, in the appropriate clinical context, necrotizing infection may be considered."  - As per surgery " Extensive subcutaneous emphysema in the anterior abdominal wall most likely related to recent surgery, necrotizing fascitis very unlikely."   # UTI- ( White Blood Cell - Urine: 20 /HPF ) -as of 9/6/23, UA is positive in the setting of diarrhea ,  no culture noted in the system    would recommend:    1. Follow up Abdominal Xray and Surgery follow up   2. Monitor kidney function, is improving  3. Continue and Adjust  Levaquin ( Qtc is 457) since Allergic to PCN  4. Monitor WBC count, is trending back up  5. OOB to chair    d/w DR. Briggs, covering NP, Titus and  Patient     Attending Attestation:    Spent more than 35 minutes on total encounter, more than 50 % of the visit was spent counseling and/or coordinating care by the Attending physician.           Patient is a 78y old  Female with PMH of transverse colon neoplasm s/p colectomy in [facility] in early August, HTN, HLD, CAD s/p LHC with TALI to RCA, Atrial fibrillation s/p watchman implant (3/23), hypothyroidism, PUJA (not on cpap), TIA, now presents to the ER for evaluation of weak, tired, lightheaded and low blood pressure, in the low 80s. Patient reports that since her surgery earlier in August she is not eating much at all and  has had diarrhea. She used to have 5-6 episodes of loose watery bowel movements every day and now have improved to 2-3 bowel movements daily. She has no fever or chills. ON Admission, she found to have no fever but Low Blood pressure, BP of 93/62. The stool studies are in process. The ID consult requested to assist with further evaluation and antibiotic management.     # Hypotension - Another episode today, 9/7/23- responded to IVF resuscitation  # R/O Infectious etiology of diarrhea- C.difficile PCR and GI pathogen PCR is negative   #Enteritis and Necrotizing  fasciitis - CT abd/pelvis shows " Extensive subcutaneous emphysema in the anterior abdominal wall. While findings may be postoperative given the history of recent surgery, in the appropriate clinical context, necrotizing infection may be considered."  - As per surgery " Extensive subcutaneous emphysema in the anterior abdominal wall most likely related to recent surgery, necrotizing fascitis very unlikely."   # UTI- ( White Blood Cell - Urine: 20 /HPF ) -as of 9/6/23, UA is positive in the setting of diarrhea ,  no culture noted in the system    would recommend:    1. Follow up Abdominal Xray and Surgery follow up   2. Monitor kidney function, is improving  3. Continue and Adjust  Levaquin ( Qtc is 457) since Allergic to PCN  4. Monitor WBC count, is trending back up  5. OOB to chair    d/w DR. Briggs, covering NP, Titus and  Patient     Attending Attestation:    Spent more than 35 minutes on total encounter, more than 50 % of the visit was spent counseling and/or coordinating care by the Attending physician.           Patient is a 78y old  Female with PMH of transverse colon neoplasm s/p colectomy in [facility] in early August, HTN, HLD, CAD s/p LHC with TALI to RCA, Atrial fibrillation s/p watchman implant (3/23), hypothyroidism, PUJA (not on cpap), TIA, now presents to the ER for evaluation of weak, tired, lightheaded and low blood pressure, in the low 80s. Patient reports that since her surgery earlier in August she is not eating much at all and  has had diarrhea. She used to have 5-6 episodes of loose watery bowel movements every day and now have improved to 2-3 bowel movements daily. She has no fever or chills. ON Admission, she found to have no fever but Low Blood pressure, BP of 93/62. The stool studies are in process. The ID consult requested to assist with further evaluation and antibiotic management.     # Hypotension - Another episode today, 9/7/23- responded to IVF resuscitation  # R/O Infectious etiology of diarrhea- C.difficile PCR and GI pathogen PCR is negative   #Enteritis and Necrotizing  fasciitis - CT abd/pelvis shows " Extensive subcutaneous emphysema in the anterior abdominal wall. While findings may be postoperative given the history of recent surgery, in the appropriate clinical context, necrotizing infection may be considered."  - As per surgery " Extensive subcutaneous emphysema in the anterior abdominal wall most likely related to recent surgery, necrotizing fascitis very unlikely."   # UTI- ( White Blood Cell - Urine: 20 /HPF ) -as of 9/6/23, UA is positive in the setting of diarrhea ,  no culture noted in the system    would recommend:    1. Follow up Abdominal Xray and Surgery follow up   2. Monitor kidney function, is improving  3. Continue and Adjust  Levaquin ( Qtc is 457) since Allergic to PCN  4. Monitor WBC count, is trending back up  5. OOB to chair    d/w DR. Briggs, covering NP, Titus and  Patient     Attending Attestation:    Spent more than 35 minutes on total encounter, more than 50 % of the visit was spent counseling and/or coordinating care by the Attending physician.

## 2023-09-08 NOTE — PROGRESS NOTE ADULT - ASSESSMENT
79 y/o F with PMH transverse colon neoplasm s/p colectomy in [facility] 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 Patient reports that since yesterday she feels weak and tired,chronic diarrhea,hypotension, now ROSSANA.  1.ROSSANA-Renal f/u,IVF.  2.Chronic diarrhea.  3.Hypothyroid-synthroid.  4.Elevated WBC-check lactate, blood cx not sent yesterday will re-order.  5.CAD-asa,b blocker,statin.  6.Afib-eliquis.  7.UTI-ABX as per ID.  8.Hypotension-add midodrine 5mg tid.  9.CXR and abd xray ordered.  10.GI prophylaxis.   77 y/o F with PMH transverse colon neoplasm s/p colectomy in [facility] 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 Patient reports that since yesterday she feels weak and tired,chronic diarrhea,hypotension, now ROSSAAN.  1.ROSSANA-Renal f/u,IVF.  2.Chronic diarrhea.  3.Hypothyroid-synthroid.  4.Elevated WBC-check lactate, blood cx not sent yesterday will re-order.  5.CAD-asa,b blocker,statin.  6.Afib-eliquis.  7.UTI-ABX as per ID.  8.Hypotension-add midodrine 5mg tid.  9.CXR and abd xray ordered.  10.GI prophylaxis.   77 y/o F with PMH transverse colon neoplasm s/p colectomy in [facility] 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 Patient reports that since yesterday she feels weak and tired,chronic diarrhea,hypotension, now ROSSANA.  1.ROSSANA-Renal f/u,IVF.  2.Chronic diarrhea.  3.Hypothyroid-synthroid.  4.Elevated WBC-check lactate, blood cx not sent yesterday will re-order.  5.CAD-asa,b blocker,statin.  6.Afib-eliquis.  7.UTI-ABX as per ID.  8.Hypotension-add midodrine 5mg tid.  9.CXR and abd xray ordered.  10.GI prophylaxis.

## 2023-09-08 NOTE — CHART NOTE - NSCHARTNOTEFT_GEN_A_CORE
Met with pt los Kang at bedside and updated on pt's clinical condition and plan of care.   All questions and concerns addressed.     ROSANA Gomez

## 2023-09-09 ENCOUNTER — TRANSCRIPTION ENCOUNTER (OUTPATIENT)
Age: 78
End: 2023-09-09

## 2023-09-09 VITALS
RESPIRATION RATE: 18 BRPM | DIASTOLIC BLOOD PRESSURE: 66 MMHG | HEART RATE: 65 BPM | OXYGEN SATURATION: 99 % | TEMPERATURE: 97 F | SYSTOLIC BLOOD PRESSURE: 102 MMHG

## 2023-09-09 LAB
ALBUMIN SERPL ELPH-MCNC: 1.8 G/DL — LOW (ref 3.5–5)
ALP SERPL-CCNC: 134 U/L — HIGH (ref 40–120)
ALT FLD-CCNC: 140 U/L DA — HIGH (ref 10–60)
ANION GAP SERPL CALC-SCNC: 4 MMOL/L — LOW (ref 5–17)
AST SERPL-CCNC: 122 U/L — HIGH (ref 10–40)
BILIRUB SERPL-MCNC: 0.4 MG/DL — SIGNIFICANT CHANGE UP (ref 0.2–1.2)
BUN SERPL-MCNC: 51 MG/DL — HIGH (ref 7–18)
CALCIUM SERPL-MCNC: 7.4 MG/DL — LOW (ref 8.4–10.5)
CHLORIDE SERPL-SCNC: 109 MMOL/L — HIGH (ref 96–108)
CO2 SERPL-SCNC: 24 MMOL/L — SIGNIFICANT CHANGE UP (ref 22–31)
CREAT SERPL-MCNC: 0.89 MG/DL — SIGNIFICANT CHANGE UP (ref 0.5–1.3)
EGFR: 66 ML/MIN/1.73M2 — SIGNIFICANT CHANGE UP
GLUCOSE SERPL-MCNC: 125 MG/DL — HIGH (ref 70–99)
HCT VFR BLD CALC: 33.1 % — LOW (ref 34.5–45)
HGB BLD-MCNC: 10.9 G/DL — LOW (ref 11.5–15.5)
MAGNESIUM SERPL-MCNC: 2.2 MG/DL — SIGNIFICANT CHANGE UP (ref 1.6–2.6)
MCHC RBC-ENTMCNC: 28.2 PG — SIGNIFICANT CHANGE UP (ref 27–34)
MCHC RBC-ENTMCNC: 32.9 GM/DL — SIGNIFICANT CHANGE UP (ref 32–36)
MCV RBC AUTO: 85.8 FL — SIGNIFICANT CHANGE UP (ref 80–100)
NRBC # BLD: 0 /100 WBCS — SIGNIFICANT CHANGE UP (ref 0–0)
PLATELET # BLD AUTO: 337 K/UL — SIGNIFICANT CHANGE UP (ref 150–400)
POTASSIUM SERPL-MCNC: 3.6 MMOL/L — SIGNIFICANT CHANGE UP (ref 3.5–5.3)
POTASSIUM SERPL-SCNC: 3.6 MMOL/L — SIGNIFICANT CHANGE UP (ref 3.5–5.3)
PROT SERPL-MCNC: 4.6 G/DL — LOW (ref 6–8.3)
RBC # BLD: 3.86 M/UL — SIGNIFICANT CHANGE UP (ref 3.8–5.2)
RBC # FLD: 15.8 % — HIGH (ref 10.3–14.5)
SARS-COV-2 RNA SPEC QL NAA+PROBE: SIGNIFICANT CHANGE UP
SODIUM SERPL-SCNC: 137 MMOL/L — SIGNIFICANT CHANGE UP (ref 135–145)
WBC # BLD: 16.96 K/UL — HIGH (ref 3.8–10.5)
WBC # FLD AUTO: 16.96 K/UL — HIGH (ref 3.8–10.5)

## 2023-09-09 PROCEDURE — 85610 PROTHROMBIN TIME: CPT

## 2023-09-09 PROCEDURE — 82607 VITAMIN B-12: CPT

## 2023-09-09 PROCEDURE — 81001 URINALYSIS AUTO W/SCOPE: CPT

## 2023-09-09 PROCEDURE — 82570 ASSAY OF URINE CREATININE: CPT

## 2023-09-09 PROCEDURE — 80053 COMPREHEN METABOLIC PANEL: CPT

## 2023-09-09 PROCEDURE — 87507 IADNA-DNA/RNA PROBE TQ 12-25: CPT

## 2023-09-09 PROCEDURE — 84443 ASSAY THYROID STIM HORMONE: CPT

## 2023-09-09 PROCEDURE — 87177 OVA AND PARASITES SMEARS: CPT

## 2023-09-09 PROCEDURE — 99285 EMERGENCY DEPT VISIT HI MDM: CPT | Mod: 25

## 2023-09-09 PROCEDURE — 71045 X-RAY EXAM CHEST 1 VIEW: CPT

## 2023-09-09 PROCEDURE — 83550 IRON BINDING TEST: CPT

## 2023-09-09 PROCEDURE — 74176 CT ABD & PELVIS W/O CONTRAST: CPT

## 2023-09-09 PROCEDURE — 82962 GLUCOSE BLOOD TEST: CPT

## 2023-09-09 PROCEDURE — 87077 CULTURE AEROBIC IDENTIFY: CPT

## 2023-09-09 PROCEDURE — 83735 ASSAY OF MAGNESIUM: CPT

## 2023-09-09 PROCEDURE — 82728 ASSAY OF FERRITIN: CPT

## 2023-09-09 PROCEDURE — 76775 US EXAM ABDO BACK WALL LIM: CPT

## 2023-09-09 PROCEDURE — 84100 ASSAY OF PHOSPHORUS: CPT

## 2023-09-09 PROCEDURE — 87637 SARSCOV2&INF A&B&RSV AMP PRB: CPT

## 2023-09-09 PROCEDURE — 80061 LIPID PANEL: CPT

## 2023-09-09 PROCEDURE — 82378 CARCINOEMBRYONIC ANTIGEN: CPT

## 2023-09-09 PROCEDURE — 87046 STOOL CULTR AEROBIC BACT EA: CPT

## 2023-09-09 PROCEDURE — 93005 ELECTROCARDIOGRAM TRACING: CPT

## 2023-09-09 PROCEDURE — 87040 BLOOD CULTURE FOR BACTERIA: CPT

## 2023-09-09 PROCEDURE — 82306 VITAMIN D 25 HYDROXY: CPT

## 2023-09-09 PROCEDURE — 85025 COMPLETE CBC W/AUTO DIFF WBC: CPT

## 2023-09-09 PROCEDURE — 84300 ASSAY OF URINE SODIUM: CPT

## 2023-09-09 PROCEDURE — 36415 COLL VENOUS BLD VENIPUNCTURE: CPT

## 2023-09-09 PROCEDURE — 80048 BASIC METABOLIC PNL TOTAL CA: CPT

## 2023-09-09 PROCEDURE — 87635 SARS-COV-2 COVID-19 AMP PRB: CPT

## 2023-09-09 PROCEDURE — 74018 RADEX ABDOMEN 1 VIEW: CPT

## 2023-09-09 PROCEDURE — 85730 THROMBOPLASTIN TIME PARTIAL: CPT

## 2023-09-09 PROCEDURE — 83540 ASSAY OF IRON: CPT

## 2023-09-09 PROCEDURE — 87493 C DIFF AMPLIFIED PROBE: CPT

## 2023-09-09 PROCEDURE — 84156 ASSAY OF PROTEIN URINE: CPT

## 2023-09-09 PROCEDURE — 86703 HIV-1/HIV-2 1 RESULT ANTBDY: CPT

## 2023-09-09 PROCEDURE — 87186 SC STD MICRODIL/AGAR DIL: CPT

## 2023-09-09 PROCEDURE — 83605 ASSAY OF LACTIC ACID: CPT

## 2023-09-09 PROCEDURE — 87045 FECES CULTURE AEROBIC BACT: CPT

## 2023-09-09 PROCEDURE — 87086 URINE CULTURE/COLONY COUNT: CPT

## 2023-09-09 PROCEDURE — 82533 TOTAL CORTISOL: CPT

## 2023-09-09 PROCEDURE — 85027 COMPLETE CBC AUTOMATED: CPT

## 2023-09-09 PROCEDURE — 83935 ASSAY OF URINE OSMOLALITY: CPT

## 2023-09-09 RX ORDER — MORPHINE SULFATE 50 MG/1
2 CAPSULE, EXTENDED RELEASE ORAL EVERY 4 HOURS
Refills: 0 | Status: DISCONTINUED | OUTPATIENT
Start: 2023-09-09 | End: 2023-09-09

## 2023-09-09 RX ORDER — NALOXONE HYDROCHLORIDE 4 MG/.1ML
0.4 SPRAY NASAL ONCE
Refills: 0 | Status: DISCONTINUED | OUTPATIENT
Start: 2023-09-09 | End: 2023-09-09

## 2023-09-09 RX ORDER — MORPHINE SULFATE 50 MG/1
4 CAPSULE, EXTENDED RELEASE ORAL EVERY 4 HOURS
Refills: 0 | Status: DISCONTINUED | OUTPATIENT
Start: 2023-09-09 | End: 2023-09-09

## 2023-09-09 RX ORDER — MIDODRINE HYDROCHLORIDE 2.5 MG/1
1 TABLET ORAL
Qty: 0 | Refills: 0 | DISCHARGE
Start: 2023-09-09

## 2023-09-09 RX ORDER — LEVOFLOXACIN 5 MG/ML
10 INJECTION, SOLUTION INTRAVENOUS
Qty: 0 | Refills: 0 | DISCHARGE
Start: 2023-09-09

## 2023-09-09 RX ADMIN — MORPHINE SULFATE 2 MILLIGRAM(S): 50 CAPSULE, EXTENDED RELEASE ORAL at 14:13

## 2023-09-09 RX ADMIN — LEVETIRACETAM 400 MILLIGRAM(S): 250 TABLET, FILM COATED ORAL at 17:38

## 2023-09-09 RX ADMIN — LEVETIRACETAM 400 MILLIGRAM(S): 250 TABLET, FILM COATED ORAL at 06:12

## 2023-09-09 RX ADMIN — MORPHINE SULFATE 2 MILLIGRAM(S): 50 CAPSULE, EXTENDED RELEASE ORAL at 14:43

## 2023-09-09 RX ADMIN — SODIUM CHLORIDE 100 MILLILITER(S): 9 INJECTION, SOLUTION INTRAVENOUS at 06:10

## 2023-09-09 NOTE — CHART NOTE - NSCHARTNOTESELECT_GEN_ALL_CORE
Event Note
Internal Medicine-Radiology contact/Event Note
Event Note
Event Note
Internal Medicine/Event Note
Event Note
Family update/Event Note
Event Note

## 2023-09-09 NOTE — PROGRESS NOTE ADULT - SUBJECTIVE AND OBJECTIVE BOX
Date of Service 09-09-23 @ 11:34    CHIEF COMPLAINT:Patient is a 78y old  Female who presents with a chief complaint of Enteritis/UTI.Pt still having diarrhea.    	  REVIEW OF SYSTEMS:  CONSTITUTIONAL: No fever, weight loss, or fatigue  EYES: No eye pain, visual disturbances, or discharge  ENT:  No difficulty hearing, tinnitus, vertigo; No sinus or throat pain  NECK: No pain or stiffness  RESPIRATORY: No cough, wheezing, chills or hemoptysis; No Shortness of Breath  CARDIOVASCULAR: No chest pain, palpitations, passing out, dizziness, or leg swelling  GASTROINTESTINAL: No abdominal or epigastric pain. No nausea, vomiting, or hematemesis; + diarrhea. No melena or hematochezia.  GENITOURINARY: No dysuria, frequency, hematuria, or incontinence  NEUROLOGICAL: No headaches, memory loss, loss of strength, numbness, or tremors  SKIN: No itching, burning, rashes, or lesions   LYMPH Nodes: No enlarged glands  ENDOCRINE: No heat or cold intolerance; No hair loss  MUSCULOSKELETAL: No joint pain or swelling; No muscle, back, or extremity pain  PSYCHIATRIC: No depression, anxiety, mood swings, or difficulty sleeping  HEME/LYMPH: No easy bruising, or bleeding gums  ALLERGY AND IMMUNOLOGIC: No hives or eczema	      PHYSICAL EXAM:  T(C): 36.5 (09-09-23 @ 05:17), Max: 36.5 (09-08-23 @ 21:45)  HR: 85 (09-09-23 @ 05:17) (78 - 85)  BP: 115/79 (09-09-23 @ 05:17) (100/67 - 149/97)  RR: 18 (09-09-23 @ 05:17) (18 - 20)  SpO2: 97% (09-09-23 @ 05:17) (95% - 97%)  Wt(kg): --  I&O's Summary      Appearance: Normal	  HEENT:   Normal oral mucosa, PERRL, EOMI	  Lymphatic: No lymphadenopathy  Cardiovascular: Normal S1 S2, No JVD, No murmurs, No edema  Respiratory: Lungs clear to auscultation	  Psychiatry: A & O x 3, Mood & affect appropriate  Gastrointestinal:  Soft, Non-tender, + BS	  Skin: No rashes, No ecchymoses, No cyanosis	  Neurologic: Non-focal  Extremities: Normal range of motion, No clubbing, cyanosis or edema  Vascular: Peripheral pulses palpable 2+ bilaterally    MEDICATIONS  (STANDING):  apixaban 5 milliGRAM(s) Oral every 12 hours  aspirin  chewable 81 milliGRAM(s) Oral daily  dextrose 5% + sodium chloride 0.45%. 1000 milliLiter(s) (100 mL/Hr) IV Continuous <Continuous>  gabapentin 100 milliGRAM(s) Oral every 8 hours  levETIRAcetam  IVPB 750 milliGRAM(s) IV Intermittent every 12 hours  levoFLOXacin IVPB 250 milliGRAM(s) IV Intermittent every 24 hours  levothyroxine Injectable 66 MICROGram(s) IV Push at bedtime  melatonin 5 milliGRAM(s) Oral at bedtime  midodrine. 5 milliGRAM(s) Oral three times a day  pantoprazole    Tablet 40 milliGRAM(s) Oral before breakfast      LABS:	 	                        10.9   16.96 )-----------( 337      ( 09 Sep 2023 08:33 )             33.1     09-09    137  |  109<H>  |  51<H>  ----------------------------<  125<H>  3.6   |  24  |  0.89    Ca    7.4<L>      09 Sep 2023 08:33  Mg     2.2     09-09    TPro  4.6<L>  /  Alb  1.8<L>  /  TBili  0.4  /  DBili  x   /  AST  122<H>  /  ALT  140<H>  /  AlkPhos  134<H>  09-09    proBNP:   Lipid Profile: Cholesterol 87  LDL --  HDL 55  TG 74  Ldl calc 17  Ratio --    HgA1c:   TSH: Thyroid Stimulating Hormone, Serum: 0.84 uU/mL (09-02 @ 06:58)      	  Lactate, Blood: 1.1 mmol/L (09.08.23 @ 11:21) < from: Xray Chest 1 View- PORTABLE-Routine (Xray Chest 1 View- PORTABLE-Routine .) (09.08.23 @ 10:39) >  ACC: [id number] EXAM:  XR CHEST PORTABLE ROUTINE 1V   ORDERED BY:  RUDOLPH HALL     PROCEDURE DATE:  09/08/2023          INTERPRETATION:  Portable chest radiograph    CLINICAL INFORMATION: Hypotension. Pneumonia like symptoms    TECHNIQUE:  Portable  AP chest radiograph.    COMPARISON: 8/31/2023 chest x-ray .    FINDINGS:  CATHETERS AND TUBES: None    PULMONARY: The visualized lungs are clear of airspace consolidations or   pleural effusions.   No pneumothorax.    HEART/VASCULAR: The heart is mildly enlarged in transverse diameter. No   hilar mass.    BONES: Visualized osseous thorax intact.    IMPRESSION:   No radiographic evidence of active chest disease.    --- End of Report ---            MERCY CROOKS MD; Attending Radiologist  This document has been electronically signed. Sep  8 2023  5:17PM    < end of copied text >  < from: Xray Abdomen 1 View PORTABLE -Urgent (Xray Abdomen 1 View PORTABLE -Urgent .) (09.08.23 @ 10:39) >  ACC: [id number] EXAM:  XR ABDOMEN PORTABLE URGENT 1V   ORDERED BY:  RUDOLPH HALL     PROCEDURE DATE:  09/08/2023          INTERPRETATION:  History:   Rule out obstruction. Hypotension.    Technique: Portable x-ray of the abdomen    Comparison: 9/3    Findings/  Impression:    Dilated loops of bowel likely small bowel compatible with obstruction.   Mild scoliosis.    --- End of Report ---            AUGUSTIN MUNOZ MD; Attending Interventional Radiologist  This document has been electronically signed. Sep  9 2023  9:15AM    < end of copied text >         Date of Service 09-09-23 @ 11:34    CHIEF COMPLAINT:Patient is a 78y old  Female who presents with a chief complaint of Enteritis/UTI.Pt still having diarrhea.    	  REVIEW OF SYSTEMS:  CONSTITUTIONAL: No fever, weight loss, or fatigue  EYES: No eye pain, visual disturbances, or discharge  ENT:  No difficulty hearing, tinnitus, vertigo; No sinus or throat pain  NECK: No pain or stiffness  RESPIRATORY: No cough, wheezing, chills or hemoptysis; No Shortness of Breath  CARDIOVASCULAR: No chest pain, palpitations, passing out, dizziness, or leg swelling  GASTROINTESTINAL: No abdominal or epigastric pain. No nausea, vomiting, or hematemesis; + diarrhea. No melena or hematochezia.  GENITOURINARY: No dysuria, frequency, hematuria, or incontinence  NEUROLOGICAL: No headaches, memory loss, loss of strength, numbness, or tremors  SKIN: No itching, burning, rashes, or lesions   LYMPH Nodes: No enlarged glands  ENDOCRINE: No heat or cold intolerance; No hair loss  MUSCULOSKELETAL: No joint pain or swelling; No muscle, back, or extremity pain  PSYCHIATRIC: No depression, anxiety, mood swings, or difficulty sleeping  HEME/LYMPH: No easy bruising, or bleeding gums  ALLERGY AND IMMUNOLOGIC: No hives or eczema	      PHYSICAL EXAM:  T(C): 36.5 (09-09-23 @ 05:17), Max: 36.5 (09-08-23 @ 21:45)  HR: 85 (09-09-23 @ 05:17) (78 - 85)  BP: 115/79 (09-09-23 @ 05:17) (100/67 - 149/97)  RR: 18 (09-09-23 @ 05:17) (18 - 20)  SpO2: 97% (09-09-23 @ 05:17) (95% - 97%)  Wt(kg): --  I&O's Summary      Appearance: Normal	  HEENT:   Normal oral mucosa, PERRL, EOMI	  Lymphatic: No lymphadenopathy  Cardiovascular: Normal S1 S2, No JVD, No murmurs, No edema  Respiratory: Lungs clear to auscultation	  Psychiatry: A & O x 3, Mood & affect appropriate  Gastrointestinal:  Soft, Non-tender, + BS	  Skin: No rashes, No ecchymoses, No cyanosis	  Neurologic: Non-focal  Extremities: Normal range of motion, No clubbing, cyanosis or edema  Vascular: Peripheral pulses palpable 2+ bilaterally    MEDICATIONS  (STANDING):  apixaban 5 milliGRAM(s) Oral every 12 hours  aspirin  chewable 81 milliGRAM(s) Oral daily  dextrose 5% + sodium chloride 0.45%. 1000 milliLiter(s) (100 mL/Hr) IV Continuous <Continuous>  gabapentin 100 milliGRAM(s) Oral every 8 hours  levETIRAcetam  IVPB 750 milliGRAM(s) IV Intermittent every 12 hours  levoFLOXacin IVPB 250 milliGRAM(s) IV Intermittent every 24 hours  levothyroxine Injectable 66 MICROGram(s) IV Push at bedtime  melatonin 5 milliGRAM(s) Oral at bedtime  midodrine. 5 milliGRAM(s) Oral three times a day  pantoprazole    Tablet 40 milliGRAM(s) Oral before breakfast      LABS:	 	                        10.9   16.96 )-----------( 337      ( 09 Sep 2023 08:33 )             33.1     09-09    137  |  109<H>  |  51<H>  ----------------------------<  125<H>  3.6   |  24  |  0.89    Ca    7.4<L>      09 Sep 2023 08:33  Mg     2.2     09-09    TPro  4.6<L>  /  Alb  1.8<L>  /  TBili  0.4  /  DBili  x   /  AST  122<H>  /  ALT  140<H>  /  AlkPhos  134<H>  09-09    proBNP:   Lipid Profile: Cholesterol 87  LDL --  HDL 55  TG 74  Ldl calc 17  Ratio --    HgA1c:   TSH: Thyroid Stimulating Hormone, Serum: 0.84 uU/mL (09-02 @ 06:58)      	  Lactate, Blood: 1.1 mmol/L (09.08.23 @ 11:21) < from: Xray Chest 1 View- PORTABLE-Routine (Xray Chest 1 View- PORTABLE-Routine .) (09.08.23 @ 10:39) >  ACC: [id number] EXAM:  XR CHEST PORTABLE ROUTINE 1V   ORDERED BY:  RUDOLPH HALL     PROCEDURE DATE:  09/08/2023          INTERPRETATION:  Portable chest radiograph    CLINICAL INFORMATION: Hypotension. Pneumonia like symptoms    TECHNIQUE:  Portable  AP chest radiograph.    COMPARISON: 8/31/2023 chest x-ray .    FINDINGS:  CATHETERS AND TUBES: None    PULMONARY: The visualized lungs are clear of airspace consolidations or   pleural effusions.   No pneumothorax.    HEART/VASCULAR: The heart is mildly enlarged in transverse diameter. No   hilar mass.    BONES: Visualized osseous thorax intact.    IMPRESSION:   No radiographic evidence of active chest disease.    --- End of Report ---            MERCY CROOKS MD; Attending Radiologist  This document has been electronically signed. Sep  8 2023  5:17PM    < end of copied text >  < from: Xray Abdomen 1 View PORTABLE -Urgent (Xray Abdomen 1 View PORTABLE -Urgent .) (09.08.23 @ 10:39) >  ACC: [id number] EXAM:  XR ABDOMEN PORTABLE URGENT 1V   ORDERED BY:  RUDOLPH HALL     PROCEDURE DATE:  09/08/2023          INTERPRETATION:  History:   Rule out obstruction. Hypotension.    Technique: Portable x-ray of the abdomen    Comparison: 9/3    Findings/  Impression:    Dilated loops of bowel likely small bowel compatible with obstruction.   Mild scoliosis.    --- End of Report ---            AUGUSTIN MUNOZ MD; Attending Interventional Radiologist  This document has been electronically signed. Sep  9 2023  9:15AM    < end of copied text >         Date of Service 09-09-23 @ 11:34    CHIEF COMPLAINT:Patient is a 78y old  Female who presents with a chief complaint of Enteritis/UTI.Pt still having diarrhea.    	  REVIEW OF SYSTEMS:  CONSTITUTIONAL: No fever, weight loss, or fatigue  EYES: No eye pain, visual disturbances, or discharge  ENT:  No difficulty hearing, tinnitus, vertigo; No sinus or throat pain  NECK: No pain or stiffness  RESPIRATORY: No cough, wheezing, chills or hemoptysis; No Shortness of Breath  CARDIOVASCULAR: No chest pain, palpitations, passing out, dizziness, or leg swelling  GASTROINTESTINAL: No abdominal or epigastric pain. No nausea, vomiting, or hematemesis; + diarrhea. No melena or hematochezia.  GENITOURINARY: No dysuria, frequency, hematuria, or incontinence  NEUROLOGICAL: No headaches, memory loss, loss of strength, numbness, or tremors  SKIN: No itching, burning, rashes, or lesions   LYMPH Nodes: No enlarged glands  ENDOCRINE: No heat or cold intolerance; No hair loss  MUSCULOSKELETAL: No joint pain or swelling; No muscle, back, or extremity pain  PSYCHIATRIC: No depression, anxiety, mood swings, or difficulty sleeping  HEME/LYMPH: No easy bruising, or bleeding gums  ALLERGY AND IMMUNOLOGIC: No hives or eczema	      PHYSICAL EXAM:  T(C): 36.5 (09-09-23 @ 05:17), Max: 36.5 (09-08-23 @ 21:45)  HR: 85 (09-09-23 @ 05:17) (78 - 85)  BP: 115/79 (09-09-23 @ 05:17) (100/67 - 149/97)  RR: 18 (09-09-23 @ 05:17) (18 - 20)  SpO2: 97% (09-09-23 @ 05:17) (95% - 97%)  Wt(kg): --  I&O's Summary      Appearance: Normal	  HEENT:   Normal oral mucosa, PERRL, EOMI	  Lymphatic: No lymphadenopathy  Cardiovascular: Normal S1 S2, No JVD, No murmurs, No edema  Respiratory: Lungs clear to auscultation	  Psychiatry: A & O x 3, Mood & affect appropriate  Gastrointestinal:  Soft, Non-tender, + BS	  Skin: No rashes, No ecchymoses, No cyanosis	  Neurologic: Non-focal  Extremities: Normal range of motion, No clubbing, cyanosis or edema  Vascular: Peripheral pulses palpable 2+ bilaterally    MEDICATIONS  (STANDING):  apixaban 5 milliGRAM(s) Oral every 12 hours  aspirin  chewable 81 milliGRAM(s) Oral daily  dextrose 5% + sodium chloride 0.45%. 1000 milliLiter(s) (100 mL/Hr) IV Continuous <Continuous>  gabapentin 100 milliGRAM(s) Oral every 8 hours  levETIRAcetam  IVPB 750 milliGRAM(s) IV Intermittent every 12 hours  levoFLOXacin IVPB 250 milliGRAM(s) IV Intermittent every 24 hours  levothyroxine Injectable 66 MICROGram(s) IV Push at bedtime  melatonin 5 milliGRAM(s) Oral at bedtime  midodrine. 5 milliGRAM(s) Oral three times a day  pantoprazole    Tablet 40 milliGRAM(s) Oral before breakfast      LABS:	 	                        10.9   16.96 )-----------( 337      ( 09 Sep 2023 08:33 )             33.1     09-09    137  |  109<H>  |  51<H>  ----------------------------<  125<H>  3.6   |  24  |  0.89    Ca    7.4<L>      09 Sep 2023 08:33  Mg     2.2     09-09    TPro  4.6<L>  /  Alb  1.8<L>  /  TBili  0.4  /  DBili  x   /  AST  122<H>  /  ALT  140<H>  /  AlkPhos  134<H>  09-09    proBNP:   Lipid Profile: Cholesterol 87  LDL --  HDL 55  TG 74  Ldl calc 17  Ratio --    HgA1c:   TSH: Thyroid Stimulating Hormone, Serum: 0.84 uU/mL (09-02 @ 06:58)      	  Lactate, Blood: 1.1 mmol/L (09.08.23 @ 11:21) < from: Xray Chest 1 View- PORTABLE-Routine (Xray Chest 1 View- PORTABLE-Routine .) (09.08.23 @ 10:39) >  ACC: [id number] EXAM:  XR CHEST PORTABLE ROUTINE 1V   ORDERED BY:  RUDOLPH HALL     PROCEDURE DATE:  09/08/2023          INTERPRETATION:  Portable chest radiograph    CLINICAL INFORMATION: Hypotension. Pneumonia like symptoms    TECHNIQUE:  Portable  AP chest radiograph.    COMPARISON: 8/31/2023 chest x-ray .    FINDINGS:  CATHETERS AND TUBES: None    PULMONARY: The visualized lungs are clear of airspace consolidations or   pleural effusions.   No pneumothorax.    HEART/VASCULAR: The heart is mildly enlarged in transverse diameter. No   hilar mass.    BONES: Visualized osseous thorax intact.    IMPRESSION:   No radiographic evidence of active chest disease.    --- End of Report ---            MERCY CROOKS MD; Attending Radiologist  This document has been electronically signed. Sep  8 2023  5:17PM    < end of copied text >  < from: Xray Abdomen 1 View PORTABLE -Urgent (Xray Abdomen 1 View PORTABLE -Urgent .) (09.08.23 @ 10:39) >  ACC: [id number] EXAM:  XR ABDOMEN PORTABLE URGENT 1V   ORDERED BY:  RUDOLPH HALL     PROCEDURE DATE:  09/08/2023          INTERPRETATION:  History:   Rule out obstruction. Hypotension.    Technique: Portable x-ray of the abdomen    Comparison: 9/3    Findings/  Impression:    Dilated loops of bowel likely small bowel compatible with obstruction.   Mild scoliosis.    --- End of Report ---            AUGUSTIN MUNOZ MD; Attending Interventional Radiologist  This document has been electronically signed. Sep  9 2023  9:15AM    < end of copied text >

## 2023-09-09 NOTE — PROGRESS NOTE ADULT - NECK
supple/symmetric/no tracheal deviation

## 2023-09-09 NOTE — PROGRESS NOTE ADULT - NEGATIVE ENMT SYMPTOMS
no hearing difficulty/no ear pain/no tinnitus/no vertigo/no sinus symptoms/no nasal congestion/no nasal discharge/no nasal obstruction/no post-nasal discharge/no nose bleeds/no gum bleeding/no dry mouth/no throat pain/no dysphagia

## 2023-09-09 NOTE — PROGRESS NOTE ADULT - NEGATIVE HEMATOLOGY SYMPTOMS
no gum bleeding/no nose bleeding

## 2023-09-09 NOTE — PROGRESS NOTE ADULT - NOSE
no discharge
no discharge/no deviation

## 2023-09-09 NOTE — PROGRESS NOTE ADULT - REASON FOR ADMISSION
Diarrhea
Enteritis/UTI
weakness,hypotension
Enteritis
enteritis/UTI
Enetritis,UTI
Weakness
Weakness
Enteritis

## 2023-09-09 NOTE — PROGRESS NOTE ADULT - TONSILS
no redness/no swelling
no swelling/no discharge
no redness/no swelling

## 2023-09-09 NOTE — PROGRESS NOTE ADULT - RESPIRATORY
clear to auscultation bilaterally/no wheezes/no rales/no rhonchi/no respiratory distress/no use of accessory muscles/no subcutaneous emphysema/airway patent/breath sounds equal/good air movement/respirations non-labored

## 2023-09-09 NOTE — PROGRESS NOTE ADULT - CONSTITUTIONAL
no distress/cachectic

## 2023-09-09 NOTE — PROGRESS NOTE ADULT - NEGATIVE PSYCHIATRIC SYMPTOMS
no suicidal ideation/no depression/no anxiety/no insomnia/no memory loss/no paranoia/no mood swings/no agitation

## 2023-09-09 NOTE — PROGRESS NOTE ADULT - NEGATIVE GASTROINTESTINAL SYMPTOMS
no nausea/no vomiting/no constipation/no melena/no hematochezia/no steatorrhea/no jaundice/no hiccoughs

## 2023-09-09 NOTE — PROGRESS NOTE ADULT - PROVIDER SPECIALTY LIST ADULT
Infectious Disease
Internal Medicine
Surgery
Internal Medicine
Surgery
Cardiology
Infectious Disease
Internal Medicine
Gastroenterology
Internal Medicine
Internal Medicine
Gastroenterology
Gastroenterology
Internal Medicine
Gastroenterology

## 2023-09-09 NOTE — CHART NOTE - NSCHARTNOTEFT_GEN_A_CORE
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 Patient reports when her blood pressure was checked in the facility it was in the low 80s. Patient reports that since her surgery earlier in August she is not eating much at all. She reports that since the surgery she has had diarrhea. Patient reports she used to have 5-6 episodes of loose watery bowel movements every day and now have improved to 2-3 bowel movements daily. Patient reports due to fear of worsening diarrhea she has decreased diet and oral intake. Patient reports she only takes soup and anything heavy makes her diarrhea worse associated with intermittent, diffuse, non radiating, 3/10 intensity, crampy abdominal pain. . Patient denies nausea, vomiting, hematemesis, hematochezia, melena, fever, chills, chest pain, SOB, palpitation, cough, hematuria, dysuria, recent traveling.       currently followed by ID  R/O Infectious etiology of diarrhea- C.difficile PCR and GI pathogen PCR is negative   #Enteritis and Necrotizing  fasciitis - CT abd/pelvis shows " Extensive subcutaneous emphysema in the anterior abdominal wall. While findings may be postoperative given the history of recent surgery, in the appropriate clinical context, necrotizing infection may be considered."  - As per surgery " Extensive subcutaneous emphysema in the anterior abdominal wall most likely related to recent surgery, necrotizing fascitis very unlikely."   # UTI- ( White Blood Cell - Urine: 20 /HPF ) -as of 9/6/23, UA is positive in the setting of diarrhea ,  no culture noted in the system    < from: Xray Abdomen 1 View PORTABLE -Urgent (Xray Abdomen 1 View PORTABLE -Urgent .) (09.08.23 @ 10:39) >    Findings/  Impression:    Dilated loops of bowel likely small bowel compatible with obstruction.   Mild scoliosis.    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 Patient reports when her blood pressure was checked in the facility it was in the low 80s. Patient reports that since her surgery earlier in August she is not eating much at all. She reports that since the surgery she has had diarrhea. Patient reports she used to have 5-6 episodes of loose watery bowel movements every day and now have improved to 2-3 bowel movements daily. Patient reports due to fear of worsening diarrhea she has decreased diet and oral intake. Patient reports she only takes soup and anything heavy makes her diarrhea worse associated with intermittent, diffuse, non radiating, 3/10 intensity, crampy abdominal pain. . Patient denies nausea, vomiting, hematemesis, hematochezia, melena, fever, chills, chest pain, SOB, palpitation, cough, hematuria, dysuria, recent traveling.       currently followed by ID  R/O Infectious etiology of diarrhea- C.difficile PCR and GI pathogen PCR is negative   #Enteritis and Necrotizing  fasciitis - CT abd/pelvis shows " Extensive subcutaneous emphysema in the anterior abdominal wall. While findings may be postoperative given the history of recent surgery, in the appropriate clinical context, necrotizing infection may be considered."  - As per surgery " Extensive subcutaneous emphysema in the anterior abdominal wall most likely related to recent surgery, necrotizing fascitis very unlikely."   # UTI- ( White Blood Cell - Urine: 20 /HPF ) -as of 9/6/23, UA is positive in the setting of diarrhea ,  no culture noted in the system    < from: Xray Abdomen 1 View PORTABLE -Urgent (Xray Abdomen 1 View PORTABLE -Urgent .) (09.08.23 @ 10:39) >    Findings/  Impression:    Dilated loops of bowel likely small bowel compatible with obstruction.   Mild scoliosis.    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 Patient reports when her blood pressure was checked in the facility it was in the low 80s. Patient reports that since her surgery earlier in August she is not eating much at all. She reports that since the surgery she has had diarrhea. Patient reports she used to have 5-6 episodes of loose watery bowel movements every day and now have improved to 2-3 bowel movements daily. Patient reports due to fear of worsening diarrhea she has decreased diet and oral intake. Patient reports she only takes soup and anything heavy makes her diarrhea worse associated with intermittent, diffuse, non radiating, 3/10 intensity, crampy abdominal pain. . Patient denies nausea, vomiting, hematemesis, hematochezia, melena, fever, chills, chest pain, SOB, palpitation, cough, hematuria, dysuria, recent traveling.       currently followed by ID  R/O Infectious etiology of diarrhea- C.difficile PCR and GI pathogen PCR is negative   #Enteritis and Necrotizing  fasciitis - CT abd/pelvis shows " Extensive subcutaneous emphysema in the anterior abdominal wall. While findings may be postoperative given the history of recent surgery, in the appropriate clinical context, necrotizing infection may be considered."  - As per surgery " Extensive subcutaneous emphysema in the anterior abdominal wall most likely related to recent surgery, necrotizing fascitis very unlikely."   # UTI- ( White Blood Cell - Urine: 20 /HPF ) -as of 9/6/23, UA is positive in the setting of diarrhea ,  no culture noted in the system    < from: Xray Abdomen 1 View PORTABLE -Urgent (Xray Abdomen 1 View PORTABLE -Urgent .) (09.08.23 @ 10:39) >    Findings/  Impression:    Dilated loops of bowel likely small bowel compatible with obstruction.   Mild scoliosis.    pt currently with continued multiple episodes diarrhea, +flatus  no vomiting    abd exam soft, obese, inc CDI  NT    case/follow up discussed with Dr Crespo  can obtain CT abd pelvis r/o obstruction  r/o cdiff

## 2023-09-09 NOTE — PROGRESS NOTE ADULT - SUBJECTIVE AND OBJECTIVE BOX
Patient is seen and examined at the bed side, is afebrile. The WBC count is trending down, and The kidney function improved to normal. The Surgery recommendation noted.  The plan to transferred patient to [facility] ,awaiting for bed availability. The abdominal xray from 9/8 shows  small bowel obstruction.       REVIEW OF SYSTEMS: All other review systems are negative      ALLERGIES: Zosyn ( Swelling of lips and itching)       Vital Signs Last 24 Hrs  T(C): 36.7 (09 Sep 2023 12:23), Max: 36.7 (09 Sep 2023 12:23)  T(F): 98 (09 Sep 2023 12:23), Max: 98 (09 Sep 2023 12:23)  HR: 68 (09 Sep 2023 12:23) (68 - 85)  BP: 119/78 (09 Sep 2023 12:23) (100/67 - 119/78)  BP(mean): --  RR: 17 (09 Sep 2023 12:23) (17 - 18)  SpO2: 98% (09 Sep 2023 12:23) (97% - 98%)    Parameters below as of 09 Sep 2023 12:23  Patient On (Oxygen Delivery Method): room air      PHYSICAL EXAM:  GENERAL: Not in distress   CHEST/LUNG: Not using accessory muscles  HEART: s1 and s2 present  ABDOMEN:  Nontender and incision site healed  EXTREMITIES: No pedal  edema  CNS: Awake and Alert      LABS:                        10.9   16.96 )-----------( 337      ( 09 Sep 2023 08:33 )             33.1                           11.8   20.37 )-----------( 439      ( 08 Sep 2023 06:38 )             35.0                09-09    137  |  109<H>  |  51<H>  ----------------------------<  125<H>  3.6   |  24  |  0.89    Ca    7.4<L>      09 Sep 2023 08:33  Mg     2.2     09-09    TPro  4.6<L>  /  Alb  1.8<L>  /  TBili  0.4  /  DBili  x   /  AST  122<H>  /  ALT  140<H>  /  AlkPhos  134<H>  09-09 09-08    137  |  108  |  73<H>  ----------------------------<  108<H>  3.9   |  20<L>  |  1.21    Ca    7.6<L>      08 Sep 2023 06:38  Mg     1.4     09-08    TPro  5.2<L>  /  Alb  2.0<L>  /  TBili  0.4  /  DBili  x   /  AST  88<H>  /  ALT  99<H>  /  AlkPhos  132<H>  09-08      Urine Microscopic-Add On (NC) (09.01.23 @ 03:10)   Red Blood Cell - Urine: 0 /HPF  White Blood Cell - Urine: 4 /HPF  Bacteria: Many /HPF  Comment - Urine: few amorphous urates  Squamous Epithelial Cells: Present      MEDICATIONS  (STANDING):    apixaban 5 milliGRAM(s) Oral every 12 hours  aspirin  chewable 81 milliGRAM(s) Oral daily  dextrose 5% + sodium chloride 0.45%. 1000 milliLiter(s) (100 mL/Hr) IV Continuous <Continuous>  gabapentin 100 milliGRAM(s) Oral every 8 hours  levETIRAcetam  IVPB 750 milliGRAM(s) IV Intermittent every 12 hours  levoFLOXacin IVPB 250 milliGRAM(s) IV Intermittent every 24 hours  levothyroxine Injectable 66 MICROGram(s) IV Push at bedtime  melatonin 5 milliGRAM(s) Oral at bedtime  midodrine. 5 milliGRAM(s) Oral three times a day  naloxone Injectable 0.4 milliGRAM(s) IV Push once  pantoprazole    Tablet 40 milliGRAM(s) Oral before breakfast      RADIOLOGY & ADDITIONAL TESTS:    9/8/23: Xray Abdomen 1 View PORTABLE -Urgent (Xray Abdomen 1 View PORTABLE -Urgent .) (09.08.23 @ 10:39) Dilated loops of bowel likely small bowel compatible with obstruction.   Mild scoliosis.    9/8/23: Xray Chest 1 View- PORTABLE-Routine (Xray Chest 1 View- PORTABLE-Routine .) (09.08.23 @ 10:39) IMPRESSION:   No radiographic evidence of active chest disease.      9/7/23: US Renal (09.07.23 @ 15:52) Limited study due to patient body habitus.  No hydronephrosis.    9/2/23 : Xray Abdomen 1 View (09.02.23 @ 10:24) Markedly distended small bowel loops throughout the abdomen   and pelvis is similar to recent CT. The CT demonstrates a status post colectomy and ileorectal anastomosis. Evaluation for free air limited on   this supine projection Subcutaneous emphysema noted laterally corresponding to recent CT abnormality.      9/1/23 : CT Abdomen and Pelvis w/ Oral Cont (09.01.23 @ 15:20) Status post colectomy with ileorectal anastomosis.    Fluid throughout non- obstructed bowel and rectum, consistent with enteritis.    Extensive subcutaneous emphysema in the anterior abdominal wall. While findings may be postoperative given the history of recent surgery, in the   appropriate clinical context, necrotizing infection may be considered. Careful clinical evaluation is therefore warranted.    These findings were discussed with Dr. MARQUITA MORFIN [phone] at  9/1/2023 3:43 PM with readback.      8/31/23 : Xray Chest 1 View- PORTABLE-Urgent (Xray Chest 1 View- PORTABLE-Urgent .) (08.31.23 @ 23:17) No acute pulmonary disease.        MICROBIOLOGY DATA:    Urine Microscopic-Add On (NC) (09.06.23 @ 10:40)   Red Blood Cell - Urine: 5 /HPF  White Blood Cell - Urine: 20 /HPF  Bacteria: Many /HPF  Squamous Epithelial Cells: many    Clostridium difficile Toxin by PCR (09.02.23 @ 05:16)   C Diff by PCR Result: NotDetec:     Culture - Stool (09.01.23 @ 03:30)   Specimen Source: .Stool Feces  Culture Results:   No enteric pathogens to date: Final culture pending    GI PCR Panel Stool (09.01.23 @ 03:30)   GI PCR Panel: NotDetec:     Urine Microscopic-Add On (NC) (09.01.23 @ 03:10)   Red Blood Cell - Urine: 0 /HPF  White Blood Cell - Urine: 4 /HPF  Bacteria: Many /HPF  Comment - Urine: few amorphous urates  Squamous Epithelial Cells: Present    Rapid HIV-1/2 Antibody (08.31.23 @ 21:30)   Rapid HIV-1/2 Antibody: Nonreact:                                                 Patient is seen and examined at the bed side, is afebrile. The WBC count is trending down, and The kidney function improved to normal. The Surgery recommendation noted.  The plan to transferred patient to [facility] ,awaiting for bed availability. The abdominal xray from 9/8 shows  small bowel obstruction.       REVIEW OF SYSTEMS: All other review systems are negative      ALLERGIES: Zosyn ( Swelling of lips and itching)       Vital Signs Last 24 Hrs  T(C): 36.7 (09 Sep 2023 12:23), Max: 36.7 (09 Sep 2023 12:23)  T(F): 98 (09 Sep 2023 12:23), Max: 98 (09 Sep 2023 12:23)  HR: 68 (09 Sep 2023 12:23) (68 - 85)  BP: 119/78 (09 Sep 2023 12:23) (100/67 - 119/78)  BP(mean): --  RR: 17 (09 Sep 2023 12:23) (17 - 18)  SpO2: 98% (09 Sep 2023 12:23) (97% - 98%)    Parameters below as of 09 Sep 2023 12:23  Patient On (Oxygen Delivery Method): room air      PHYSICAL EXAM:  GENERAL: Not in distress   CHEST/LUNG: Not using accessory muscles  HEART: s1 and s2 present  ABDOMEN:  Nontender and incision site healed  EXTREMITIES: No pedal  edema  CNS: Awake and Alert      LABS:                        10.9   16.96 )-----------( 337      ( 09 Sep 2023 08:33 )             33.1                           11.8   20.37 )-----------( 439      ( 08 Sep 2023 06:38 )             35.0                09-09    137  |  109<H>  |  51<H>  ----------------------------<  125<H>  3.6   |  24  |  0.89    Ca    7.4<L>      09 Sep 2023 08:33  Mg     2.2     09-09    TPro  4.6<L>  /  Alb  1.8<L>  /  TBili  0.4  /  DBili  x   /  AST  122<H>  /  ALT  140<H>  /  AlkPhos  134<H>  09-09 09-08    137  |  108  |  73<H>  ----------------------------<  108<H>  3.9   |  20<L>  |  1.21    Ca    7.6<L>      08 Sep 2023 06:38  Mg     1.4     09-08    TPro  5.2<L>  /  Alb  2.0<L>  /  TBili  0.4  /  DBili  x   /  AST  88<H>  /  ALT  99<H>  /  AlkPhos  132<H>  09-08      Urine Microscopic-Add On (NC) (09.01.23 @ 03:10)   Red Blood Cell - Urine: 0 /HPF  White Blood Cell - Urine: 4 /HPF  Bacteria: Many /HPF  Comment - Urine: few amorphous urates  Squamous Epithelial Cells: Present      MEDICATIONS  (STANDING):    apixaban 5 milliGRAM(s) Oral every 12 hours  aspirin  chewable 81 milliGRAM(s) Oral daily  dextrose 5% + sodium chloride 0.45%. 1000 milliLiter(s) (100 mL/Hr) IV Continuous <Continuous>  gabapentin 100 milliGRAM(s) Oral every 8 hours  levETIRAcetam  IVPB 750 milliGRAM(s) IV Intermittent every 12 hours  levoFLOXacin IVPB 250 milliGRAM(s) IV Intermittent every 24 hours  levothyroxine Injectable 66 MICROGram(s) IV Push at bedtime  melatonin 5 milliGRAM(s) Oral at bedtime  midodrine. 5 milliGRAM(s) Oral three times a day  naloxone Injectable 0.4 milliGRAM(s) IV Push once  pantoprazole    Tablet 40 milliGRAM(s) Oral before breakfast      RADIOLOGY & ADDITIONAL TESTS:    9/8/23: Xray Abdomen 1 View PORTABLE -Urgent (Xray Abdomen 1 View PORTABLE -Urgent .) (09.08.23 @ 10:39) Dilated loops of bowel likely small bowel compatible with obstruction.   Mild scoliosis.    9/8/23: Xray Chest 1 View- PORTABLE-Routine (Xray Chest 1 View- PORTABLE-Routine .) (09.08.23 @ 10:39) IMPRESSION:   No radiographic evidence of active chest disease.      9/7/23: US Renal (09.07.23 @ 15:52) Limited study due to patient body habitus.  No hydronephrosis.    9/2/23 : Xray Abdomen 1 View (09.02.23 @ 10:24) Markedly distended small bowel loops throughout the abdomen   and pelvis is similar to recent CT. The CT demonstrates a status post colectomy and ileorectal anastomosis. Evaluation for free air limited on   this supine projection Subcutaneous emphysema noted laterally corresponding to recent CT abnormality.      9/1/23 : CT Abdomen and Pelvis w/ Oral Cont (09.01.23 @ 15:20) Status post colectomy with ileorectal anastomosis.    Fluid throughout non- obstructed bowel and rectum, consistent with enteritis.    Extensive subcutaneous emphysema in the anterior abdominal wall. While findings may be postoperative given the history of recent surgery, in the   appropriate clinical context, necrotizing infection may be considered. Careful clinical evaluation is therefore warranted.    These findings were discussed with Dr. MARQUITA MORFIN [phone] at  9/1/2023 3:43 PM with readback.      8/31/23 : Xray Chest 1 View- PORTABLE-Urgent (Xray Chest 1 View- PORTABLE-Urgent .) (08.31.23 @ 23:17) No acute pulmonary disease.        MICROBIOLOGY DATA:    Urine Microscopic-Add On (NC) (09.06.23 @ 10:40)   Red Blood Cell - Urine: 5 /HPF  White Blood Cell - Urine: 20 /HPF  Bacteria: Many /HPF  Squamous Epithelial Cells: many    Clostridium difficile Toxin by PCR (09.02.23 @ 05:16)   C Diff by PCR Result: NotDetec:     Culture - Stool (09.01.23 @ 03:30)   Specimen Source: .Stool Feces  Culture Results:   No enteric pathogens to date: Final culture pending    GI PCR Panel Stool (09.01.23 @ 03:30)   GI PCR Panel: NotDetec:     Urine Microscopic-Add On (NC) (09.01.23 @ 03:10)   Red Blood Cell - Urine: 0 /HPF  White Blood Cell - Urine: 4 /HPF  Bacteria: Many /HPF  Comment - Urine: few amorphous urates  Squamous Epithelial Cells: Present    Rapid HIV-1/2 Antibody (08.31.23 @ 21:30)   Rapid HIV-1/2 Antibody: Nonreact:                                                 Patient is seen and examined at the bed side, is afebrile. The WBC count is trending down, and The kidney function improved to normal. The Surgery recommendation noted.  The plan to transferred patient to [facility] ,awaiting for bed availability. The abdominal xray from 9/8 shows  small bowel obstruction.       REVIEW OF SYSTEMS: All other review systems are negative      ALLERGIES: Zosyn ( Swelling of lips and itching)       Vital Signs Last 24 Hrs  T(C): 36.7 (09 Sep 2023 12:23), Max: 36.7 (09 Sep 2023 12:23)  T(F): 98 (09 Sep 2023 12:23), Max: 98 (09 Sep 2023 12:23)  HR: 68 (09 Sep 2023 12:23) (68 - 85)  BP: 119/78 (09 Sep 2023 12:23) (100/67 - 119/78)  BP(mean): --  RR: 17 (09 Sep 2023 12:23) (17 - 18)  SpO2: 98% (09 Sep 2023 12:23) (97% - 98%)    Parameters below as of 09 Sep 2023 12:23  Patient On (Oxygen Delivery Method): room air      PHYSICAL EXAM:  GENERAL: Not in distress   CHEST/LUNG: Not using accessory muscles  HEART: s1 and s2 present  ABDOMEN:  Nontender and incision site healed  EXTREMITIES: No pedal  edema  CNS: Awake and Alert      LABS:                        10.9   16.96 )-----------( 337      ( 09 Sep 2023 08:33 )             33.1                           11.8   20.37 )-----------( 439      ( 08 Sep 2023 06:38 )             35.0                09-09    137  |  109<H>  |  51<H>  ----------------------------<  125<H>  3.6   |  24  |  0.89    Ca    7.4<L>      09 Sep 2023 08:33  Mg     2.2     09-09    TPro  4.6<L>  /  Alb  1.8<L>  /  TBili  0.4  /  DBili  x   /  AST  122<H>  /  ALT  140<H>  /  AlkPhos  134<H>  09-09 09-08    137  |  108  |  73<H>  ----------------------------<  108<H>  3.9   |  20<L>  |  1.21    Ca    7.6<L>      08 Sep 2023 06:38  Mg     1.4     09-08    TPro  5.2<L>  /  Alb  2.0<L>  /  TBili  0.4  /  DBili  x   /  AST  88<H>  /  ALT  99<H>  /  AlkPhos  132<H>  09-08      Urine Microscopic-Add On (NC) (09.01.23 @ 03:10)   Red Blood Cell - Urine: 0 /HPF  White Blood Cell - Urine: 4 /HPF  Bacteria: Many /HPF  Comment - Urine: few amorphous urates  Squamous Epithelial Cells: Present      MEDICATIONS  (STANDING):    apixaban 5 milliGRAM(s) Oral every 12 hours  aspirin  chewable 81 milliGRAM(s) Oral daily  dextrose 5% + sodium chloride 0.45%. 1000 milliLiter(s) (100 mL/Hr) IV Continuous <Continuous>  gabapentin 100 milliGRAM(s) Oral every 8 hours  levETIRAcetam  IVPB 750 milliGRAM(s) IV Intermittent every 12 hours  levoFLOXacin IVPB 250 milliGRAM(s) IV Intermittent every 24 hours  levothyroxine Injectable 66 MICROGram(s) IV Push at bedtime  melatonin 5 milliGRAM(s) Oral at bedtime  midodrine. 5 milliGRAM(s) Oral three times a day  naloxone Injectable 0.4 milliGRAM(s) IV Push once  pantoprazole    Tablet 40 milliGRAM(s) Oral before breakfast      RADIOLOGY & ADDITIONAL TESTS:    9/8/23: Xray Abdomen 1 View PORTABLE -Urgent (Xray Abdomen 1 View PORTABLE -Urgent .) (09.08.23 @ 10:39) Dilated loops of bowel likely small bowel compatible with obstruction.   Mild scoliosis.    9/8/23: Xray Chest 1 View- PORTABLE-Routine (Xray Chest 1 View- PORTABLE-Routine .) (09.08.23 @ 10:39) IMPRESSION:   No radiographic evidence of active chest disease.      9/7/23: US Renal (09.07.23 @ 15:52) Limited study due to patient body habitus.  No hydronephrosis.    9/2/23 : Xray Abdomen 1 View (09.02.23 @ 10:24) Markedly distended small bowel loops throughout the abdomen   and pelvis is similar to recent CT. The CT demonstrates a status post colectomy and ileorectal anastomosis. Evaluation for free air limited on   this supine projection Subcutaneous emphysema noted laterally corresponding to recent CT abnormality.      9/1/23 : CT Abdomen and Pelvis w/ Oral Cont (09.01.23 @ 15:20) Status post colectomy with ileorectal anastomosis.    Fluid throughout non- obstructed bowel and rectum, consistent with enteritis.    Extensive subcutaneous emphysema in the anterior abdominal wall. While findings may be postoperative given the history of recent surgery, in the   appropriate clinical context, necrotizing infection may be considered. Careful clinical evaluation is therefore warranted.    These findings were discussed with Dr. MARQUITA MORFIN [phone] at  9/1/2023 3:43 PM with readback.      8/31/23 : Xray Chest 1 View- PORTABLE-Urgent (Xray Chest 1 View- PORTABLE-Urgent .) (08.31.23 @ 23:17) No acute pulmonary disease.        MICROBIOLOGY DATA:    Urine Microscopic-Add On (NC) (09.06.23 @ 10:40)   Red Blood Cell - Urine: 5 /HPF  White Blood Cell - Urine: 20 /HPF  Bacteria: Many /HPF  Squamous Epithelial Cells: many    Clostridium difficile Toxin by PCR (09.02.23 @ 05:16)   C Diff by PCR Result: NotDetec:     Culture - Stool (09.01.23 @ 03:30)   Specimen Source: .Stool Feces  Culture Results:   No enteric pathogens to date: Final culture pending    GI PCR Panel Stool (09.01.23 @ 03:30)   GI PCR Panel: NotDetec:     Urine Microscopic-Add On (NC) (09.01.23 @ 03:10)   Red Blood Cell - Urine: 0 /HPF  White Blood Cell - Urine: 4 /HPF  Bacteria: Many /HPF  Comment - Urine: few amorphous urates  Squamous Epithelial Cells: Present    Rapid HIV-1/2 Antibody (08.31.23 @ 21:30)   Rapid HIV-1/2 Antibody: Nonreact:

## 2023-09-09 NOTE — CHART NOTE - NSCHARTNOTEFT_GEN_A_CORE
Plan of care discussed with daughter at bedside who requests transfer to [facility] given recent SX   Contact made with [facility] transfer center/ Dr Mckeon and transfer initiated   requests COVID PCR stat   Pending bed availability Plan of care discussed with daughter at bedside who requests transfer to [facility] given recent SX   Contact made with [facility] transfer center/ Dr Mckeon and transfer initiated   requests COVID PCR stat   Pending bed availability Plan of care discussed with daughter at bedside who requests transfer to [facility] given recent SX   Contact made with [facility] transfer center/ Dr Mckeno and transfer initiated   requests COVID PCR stat   Pending bed availability

## 2023-09-09 NOTE — PROGRESS NOTE ADULT - NEGATIVE GENERAL SYMPTOMS
no fever/no chills/no sweating/no anorexia/no polyphagia/no polyuria/no polydipsia

## 2023-09-09 NOTE — DISCHARGE NOTE NURSING/CASE MANAGEMENT/SOCIAL WORK - PATIENT PORTAL LINK FT
You can access the FollowMyHealth Patient Portal offered by [facility] by registering at the following website: [email or web address]. By joining [employer]’s FollowMyHealth portal, you will also be able to view your health information using other applications (apps) compatible with our system. You can access the FollowMyHealth Patient Portal offered by [facility] by registering at the following website: [email or web address]. By joining [employer]’s FollowMyHealth portal, you will also be able to view your health information using other applications (apps) compatible with our system. You can access the FollowMyHealth Patient Portal offered by [facility] by registering at the following website: [email or web address]. By joining [employer]’s FollowMyHealth portal, you will also be able to view your health information using other applications (apps) compatible with our system.

## 2023-09-09 NOTE — PROGRESS NOTE ADULT - SUBJECTIVE AND OBJECTIVE BOX
[   ] ICU                                          [   ] CCU                                      [ X  ] Medical Floor    Patient is a 78 year old female with persistent diarrhea. GI consulted to evaluate.         Patient is a 78 year old female with past medical history significant for transverse colon neoplasm s/p colectomy in [facility] in early August 2023, HTN, HLD, CAD s/p LHC with TALI to RCA, Atrial fibrillation s/p watchman implant (3/23), hypothyroidism, PUJA, and TIA presented to the emergency room with 2 days history of lightheaded and feels lethargic. Patient reports when her blood pressure was checked in the facility it was in the low 80s. Patient reports that since her surgery earlier in August she is not eating much at all. She reports that since the surgery she has had diarrhea. Patient reports she used to have 5-6 episodes of loose watery bowel movements every day and now have improved to 2-3 bowel movements daily. Patient reports due to fear of worsening diarrhea she has decreased diet and oral intake. Patient reports she only takes soup and anything heavy makes her diarrhea worse associated with intermittent, diffuse, non radiating, 3/10 intensity, crampy abdominal pain. . Patient denies nausea, vomiting, hematemesis, hematochezia, melena, fever, chills, chest pain, SOB, palpitation, cough, hematuria, dysuria, recent traveling.        Patient is comfortable. No new complaints reported, No abdominal pain, N/V, hematemesis, hematochezia, melena, fever, chills, chest pain, SOB, cough or diarrhea reported.      PAIN MANAGEMENT:  Pain Scale:                 3/10  Pain Location:  Diffuse crampy abdominal pain       PAST MEDICAL HISTORY    Atrial fibrillation    Hyperlipidemia    Hypertension    CAD (coronary artery disease)    Colon cancer    Hypothyroidism    Obstructive sleep apnea    Transient ischemic attack (TIA)             PAST SURGICAL HISTORY     Colectomy        Allergies    No Known Allergies    Intolerances  None       SOCIAL HISTORY  Advanced Directives:       [ X ] Full Code       [  ] DNR  Marital Status:         [  ] M      [ X ] S      [  ] D       [  ] W  Children:       [ X ] Yes      [  ] No  Occupation:        [  ] Employed       [ X ] Unemployed       [  ] Retired  Diet:       [ X ] Regular       [  ] PEG feeding          [  ] NG tube feeding  Drug Use:           [  ] Patient denied          [  ] Yes  Alcohol:           [ X ] No             [  ] Yes (socially)         [  ] Yes (chronic)  Tobacco:           [  ] Yes           [ X ] No      FAMILY HISTORY  [ X ] Heart Disease            [ X ] Diabetes             [ X ] HTN             [  ] Colon Cancer             [  ] Stomach Cancer              [  ] Pancreatic Cancer        VITALS  Vital Signs Last 24 Hrs  T(C): 36.5 (09 Sep 2023 05:17), Max: 36.5 (08 Sep 2023 21:45)  T(F): 97.7 (09 Sep 2023 05:17), Max: 97.7 (08 Sep 2023 21:45)  HR: 85 (09 Sep 2023 05:17) (78 - 85)  BP: 115/79 (09 Sep 2023 05:17) (100/67 - 149/97)   RR: 18 (09 Sep 2023 05:17) (18 - 20)  SpO2: 97% (09 Sep 2023 05:17) (95% - 97%)  Parameters below as of 09 Sep 2023 05:17  Patient On (Oxygen Delivery Method): room air       MEDICATIONS  (STANDING):  apixaban 5 milliGRAM(s) Oral every 12 hours  aspirin  chewable 81 milliGRAM(s) Oral daily  dextrose 5% + sodium chloride 0.45%. 1000 milliLiter(s) (100 mL/Hr) IV Continuous <Continuous>  gabapentin 100 milliGRAM(s) Oral every 8 hours  levETIRAcetam  IVPB 750 milliGRAM(s) IV Intermittent every 12 hours  levoFLOXacin IVPB 250 milliGRAM(s) IV Intermittent every 24 hours  levothyroxine Injectable 66 MICROGram(s) IV Push at bedtime  melatonin 5 milliGRAM(s) Oral at bedtime  midodrine. 5 milliGRAM(s) Oral three times a day  pantoprazole    Tablet 40 milliGRAM(s) Oral before breakfast    MEDICATIONS  (PRN):  acetaminophen     Tablet .. 650 milliGRAM(s) Oral every 6 hours PRN Temp greater or equal to 38C (100.4F), Mild Pain (1 - 3)  albuterol    90 MICROgram(s) HFA Inhaler 2 Puff(s) Inhalation every 6 hours PRN Shortness of Breath and/or Wheezing  metoclopramide Injectable 10 milliGRAM(s) IV Push every 8 hours PRN nausea, vomiting  ondansetron Injectable 4 milliGRAM(s) IV Push every 6 hours PRN Nausea and/or Vomiting  simethicone 80 milliGRAM(s) Chew four times a day PRN Gas                            10.9   16.96 )-----------( 337      ( 09 Sep 2023 08:33 )             33.1       09-08    137  |  108  |  73<H>  ----------------------------<  108<H>  3.9   |  20<L>  |  1.21    Ca    7.6<L>      08 Sep 2023 06:38  Mg     1.4     09-08    TPro  5.2<L>  /  Alb  2.0<L>  /  TBili  0.4  /  DBili  x   /  AST  88<H>  /  ALT  99<H>  /  AlkPhos  132<H>  09-08

## 2023-09-09 NOTE — DISCHARGE NOTE NURSING/CASE MANAGEMENT/SOCIAL WORK - NSDCPEFALRISK_GEN_ALL_CORE
For information on Fall & Injury Prevention, visit: [email or web address] OR  [email or web address] OR  https://www.cdc.gov/steadi/patient.html For information on Fall & Injury Prevention, visit: [email or web address] OR  [email or web address]-injury OR  https://www.cdc.gov/steadi/patient.html For information on Fall & Injury Prevention, visit: [email or web address]-[email or web address]-mobility OR  [email or web address]-[email or web address]-injury OR  https://www.cdc.gov/steadi/patient.html

## 2023-09-09 NOTE — PROGRESS NOTE ADULT - NEGATIVE SKIN SYMPTOMS
no rash/no itching/no dryness/no brittle nails/no pitted nails/no hair loss

## 2023-09-09 NOTE — PROGRESS NOTE ADULT - EYES
PERRL/EOMI/conjunctiva clear/non icteric sclera

## 2023-09-09 NOTE — PROGRESS NOTE ADULT - NEGATIVE GENERAL GENITOURINARY SYMPTOMS
no hematuria/no renal colic/no flank pain L/no dysuria

## 2023-09-09 NOTE — DISCHARGE NOTE NURSING/CASE MANAGEMENT/SOCIAL WORK - NSDPDISTO_GEN_ALL_CORE
Acute care [facility] Acute care [facility] Acute care [facility]

## 2023-09-09 NOTE — PROGRESS NOTE ADULT - MOUTH
normal mouth and gums/moist

## 2023-09-09 NOTE — PROGRESS NOTE ADULT - NEGATIVE CARDIOVASCULAR SYMPTOMS
no chest pain/no palpitations/no orthopnea

## 2023-09-09 NOTE — PROGRESS NOTE ADULT - NEGATIVE OPHTHALMOLOGIC SYMPTOMS
no diplopia/no photophobia/no lacrimation L/no lacrimation R/no blurred vision L/no blurred vision R/no discharge L/no discharge R/no pain L/no pain R/no irritation L/no irritation R/no loss of vision L/no loss of vision R/no scleral injection L/no scleral injection R

## 2023-09-09 NOTE — PROGRESS NOTE ADULT - NEGATIVE NEUROLOGICAL SYMPTOMS
no syncope/no tremors/no vertigo/no loss of sensation/no headache

## 2023-09-09 NOTE — PROGRESS NOTE ADULT - NEGATIVE RESPIRATORY AND THORAX SYMPTOMS
no wheezing/no dyspnea/no cough/no hemoptysis

## 2023-09-09 NOTE — PROGRESS NOTE ADULT - ASSESSMENT
1. Diarrhea  2. Anemia  3. No evidence of acute GI bleeding  4. Leukocytosis     Suggestions:    1. Diet as tolerated  2. Antibiotics as per ID  3. ID follow up  4. Monitor electrolytes  5. Protonix daily  6. Monitor H/H  7. Transfuse PRBC as needed  8. Avoid NSAID  9. DVT prophylaxis

## 2023-09-09 NOTE — PROGRESS NOTE ADULT - ASSESSMENT
79 y/o F with PMH transverse colon neoplasm s/p colectomy in [facility] 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 Patient reports that since yesterday she feels weak and tired,chronic diarrhea,hypotension,s/p ROSSANA, now SBO vs ileus..  1.ROSSANA-Renal f/u,IVF.  2.Chronic diarrhea.  3.Hypothyroid-synthroid.  4.Elevated WBC-check lactate, blood cx not sent yesterday will re-order.  5.CAD-asa,b blocker,statin.  6.Afib-eliquis.  7.UTI-ABX as per ID.  8.Hypotension- midodrine 5mg tid.Cortisol level-p.  9.SBO vs ileus-NPO until surgical re-eval.  10.GI prophylaxis.   77 y/o F with PMH transverse colon neoplasm s/p colectomy in [facility] 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 Patient reports that since yesterday she feels weak and tired,chronic diarrhea,hypotension,s/p ROSSANA, now SBO vs ileus..  1.ROSSANA-Renal f/u,IVF.  2.Chronic diarrhea.  3.Hypothyroid-synthroid.  4.Elevated WBC-check lactate, blood cx not sent yesterday will re-order.  5.CAD-asa,b blocker,statin.  6.Afib-eliquis.  7.UTI-ABX as per ID.  8.Hypotension- midodrine 5mg tid.Cortisol level-p.  9.SBO vs ileus-NPO until surgical re-eval.  10.GI prophylaxis.   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 Patient reports that since yesterday she feels weak and tired,chronic diarrhea,hypotension,s/p ROSSANA, now SBO vs ileus..  1.ROSSANA-Renal f/u,IVF.  2.Chronic diarrhea.  3.Hypothyroid-synthroid.  4.Elevated WBC-check lactate, blood cx not sent yesterday will re-order.  5.CAD-asa,b blocker,statin.  6.Afib-eliquis.  7.UTI-ABX as per ID.  8.Hypotension- midodrine 5mg tid.Cortisol level-p.  9.SBO vs ileus-NPO until surgical re-eval.  10.GI prophylaxis.

## 2023-09-09 NOTE — PROGRESS NOTE ADULT - ASSESSMENT
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 Patient reports that since her surgery earlier in August she is not eating much at all and  has had diarrhea. She used to have 5-6 episodes of loose watery bowel movements every day and now have improved to 2-3 bowel movements daily. She has no fever or chills. ON Admission, she found to have no fever but Low Blood pressure, BP of 93/62. The stool studies are in process. The ID consult requested to assist with further evaluation and antibiotic management.     # Hypotension - Another episode today, 9/7/23- responded to IVF resuscitation  # R/O Infectious etiology of diarrhea- C.difficile PCR and GI pathogen PCR is negative   #Enteritis and Necrotizing  fasciitis - CT abd/pelvis shows " Extensive subcutaneous emphysema in the anterior abdominal wall. While findings may be postoperative given the history of recent surgery, in the appropriate clinical context, necrotizing infection may be considered."  - As per surgery " Extensive subcutaneous emphysema in the anterior abdominal wall most likely related to recent surgery, necrotizing fascitis very unlikely."   # UTI- ( White Blood Cell - Urine: 20 /HPF ) -as of 9/6/23, UA is positive in the setting of diarrhea ,  no culture noted in the system  #SOB as of Abdominal xray from 9/8    would recommend:    1. NPO, IVF and awaiting for bed availability at [facility] under Surgery service  2. Monitor kidney function, has improved to normal  3. Continue Levaquin ( Qtc is 457) and adjust doses based on Crcl since Allergic to PCN, to treat UTI until 9/10/23  4. Monitor WBC count, is trending down    Attending Attestation:    Spent more than 35 minutes on total encounter, more than 50 % of the visit was spent counseling and/or coordinating care by the Attending physician.     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 Patient reports that since her surgery earlier in August she is not eating much at all and  has had diarrhea. She used to have 5-6 episodes of loose watery bowel movements every day and now have improved to 2-3 bowel movements daily. She has no fever or chills. ON Admission, she found to have no fever but Low Blood pressure, BP of 93/62. The stool studies are in process. The ID consult requested to assist with further evaluation and antibiotic management.     # Hypotension - Another episode today, 9/7/23- responded to IVF resuscitation  # R/O Infectious etiology of diarrhea- C.difficile PCR and GI pathogen PCR is negative   #Enteritis and Necrotizing  fasciitis - CT abd/pelvis shows " Extensive subcutaneous emphysema in the anterior abdominal wall. While findings may be postoperative given the history of recent surgery, in the appropriate clinical context, necrotizing infection may be considered."  - As per surgery " Extensive subcutaneous emphysema in the anterior abdominal wall most likely related to recent surgery, necrotizing fascitis very unlikely."   # UTI- ( White Blood Cell - Urine: 20 /HPF ) -as of 9/6/23, UA is positive in the setting of diarrhea ,  no culture noted in the system  #SOB as of Abdominal xray from 9/8    would recommend:    1. NPO, IVF and awaiting for bed availability at [facility] under Surgery service  2. Monitor kidney function, has improved to normal  3. Continue Levaquin ( Qtc is 457) and adjust doses based on Crcl since Allergic to PCN, to treat UTI until 9/10/23  4. Monitor WBC count, is trending down    Attending Attestation:    Spent more than 35 minutes on total encounter, more than 50 % of the visit was spent counseling and/or coordinating care by the Attending physician.     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 Patient reports that since her surgery earlier in August she is not eating much at all and  has had diarrhea. She used to have 5-6 episodes of loose watery bowel movements every day and now have improved to 2-3 bowel movements daily. She has no fever or chills. ON Admission, she found to have no fever but Low Blood pressure, BP of 93/62. The stool studies are in process. The ID consult requested to assist with further evaluation and antibiotic management.     # Hypotension - Another episode today, 9/7/23- responded to IVF resuscitation  # R/O Infectious etiology of diarrhea- C.difficile PCR and GI pathogen PCR is negative   #Enteritis and Necrotizing  fasciitis - CT abd/pelvis shows " Extensive subcutaneous emphysema in the anterior abdominal wall. While findings may be postoperative given the history of recent surgery, in the appropriate clinical context, necrotizing infection may be considered."  - As per surgery " Extensive subcutaneous emphysema in the anterior abdominal wall most likely related to recent surgery, necrotizing fascitis very unlikely."   # UTI- ( White Blood Cell - Urine: 20 /HPF ) -as of 9/6/23, UA is positive in the setting of diarrhea ,  no culture noted in the system  #SOB as of Abdominal xray from 9/8    would recommend:    1. NPO, IVF and awaiting for bed availability at [facility] under Surgery service  2. Monitor kidney function, has improved to normal  3. Continue Levaquin ( Qtc is 457) and adjust doses based on Crcl since Allergic to PCN, to treat UTI until 9/10/23  4. Monitor WBC count, is trending down    Attending Attestation:    Spent more than 35 minutes on total encounter, more than 50 % of the visit was spent counseling and/or coordinating care by the Attending physician.

## 2023-09-09 NOTE — PROGRESS NOTE ADULT - GIT GEN HX ROS MEA POS PC
diarrhea/abdominal pain

## 2023-09-13 LAB
CULTURE RESULTS: SIGNIFICANT CHANGE UP
SPECIMEN SOURCE: SIGNIFICANT CHANGE UP

## 2023-09-16 LAB
CORTICOSTEROID BINDING GLOBULIN RESULT: 2 MG/DL — SIGNIFICANT CHANGE UP
CORTIS F/TOTAL MFR SERPL: 59 % — SIGNIFICANT CHANGE UP
CORTIS SERPL-MCNC: 33 UG/DL — HIGH
CORTISOL, FREE RESULT: 19 UG/DL — HIGH

## 2023-10-01 NOTE — PROGRESS NOTE ADULT - SKIN
warm and dry/no rashes/no ulcers
warm and dry/no rashes
warm and dry/no rashes/no ulcers
Opt out
warm and dry/no rashes/no ulcers

## 2023-11-15 NOTE — CONSULT NOTE ADULT - EYES
conjunctiva clear
PERRL/EOMI/conjunctiva clear/non icteric sclera

## 2023-12-20 PROBLEM — F41.9 ANXIETY DISORDER, UNSPECIFIED: Chronic | Status: ACTIVE | Noted: 2017-01-04

## 2023-12-20 PROBLEM — E78.5 HYPERLIPIDEMIA, UNSPECIFIED: Chronic | Status: ACTIVE | Noted: 2017-01-04

## 2023-12-20 RX ORDER — ACETAMINOPHEN 500 MG
2 TABLET ORAL
Refills: 0 | DISCHARGE

## 2023-12-20 RX ORDER — LOSARTAN POTASSIUM 100 MG/1
1 TABLET, FILM COATED ORAL
Refills: 0 | DISCHARGE

## 2023-12-20 RX ORDER — LOPERAMIDE HCL 2 MG
1 TABLET ORAL
Refills: 0 | DISCHARGE

## 2023-12-20 RX ORDER — OXYBUTYNIN CHLORIDE 5 MG
1 TABLET ORAL
Refills: 0 | DISCHARGE

## 2023-12-20 RX ORDER — APIXABAN 2.5 MG/1
1 TABLET, FILM COATED ORAL
Refills: 0 | DISCHARGE

## 2023-12-20 RX ORDER — EZETIMIBE 10 MG/1
1 TABLET ORAL
Refills: 0 | DISCHARGE

## 2023-12-20 RX ORDER — GABAPENTIN 400 MG/1
1 CAPSULE ORAL
Refills: 0 | DISCHARGE

## 2023-12-20 RX ORDER — VENLAFAXINE HCL 75 MG
1 CAPSULE, EXT RELEASE 24 HR ORAL
Refills: 0 | DISCHARGE

## 2023-12-20 RX ORDER — LANOLIN ALCOHOL/MO/W.PET/CERES
1 CREAM (GRAM) TOPICAL
Refills: 0 | DISCHARGE

## 2023-12-20 RX ORDER — ASPIRIN/CALCIUM CARB/MAGNESIUM 324 MG
1 TABLET ORAL
Refills: 0 | DISCHARGE

## 2023-12-20 RX ORDER — LEVETIRACETAM 250 MG/1
1 TABLET, FILM COATED ORAL
Refills: 0 | DISCHARGE

## 2023-12-20 RX ORDER — ATORVASTATIN CALCIUM 80 MG/1
1 TABLET, FILM COATED ORAL
Refills: 0 | DISCHARGE

## 2023-12-20 RX ORDER — PANTOPRAZOLE SODIUM 20 MG/1
1 TABLET, DELAYED RELEASE ORAL
Refills: 0 | DISCHARGE

## 2023-12-20 RX ORDER — LEVOTHYROXINE SODIUM 125 MCG
1 TABLET ORAL
Refills: 0 | DISCHARGE

## 2023-12-20 RX ORDER — ALBUTEROL 90 UG/1
2 AEROSOL, METERED ORAL
Refills: 0 | DISCHARGE

## 2023-12-20 RX ORDER — ALPRAZOLAM 0.25 MG
1 TABLET ORAL
Refills: 0 | DISCHARGE

## 2023-12-20 RX ORDER — UBIDECARENONE 100 MG
1 CAPSULE ORAL
Refills: 0 | DISCHARGE

## 2024-09-10 NOTE — ED ADULT NURSE NOTE - CAS EDN DISCHARGE INTERVENTIONS
Addended by: EMELYN DEE OM on: 9/10/2024 10:44 AM     Modules accepted: Orders    
Arm band on/IV intact

## 2024-11-21 NOTE — PROGRESS NOTE ADULT - PROBLEM SELECTOR PLAN 1
OB?  No  Pharmacy Verified? No   Reason for Call: results, Patient wants to speak with Dr Durham directly about her results. Please advise. Thank you   Best form of Contact:    Cell Phone:   Telephone Information:   Mobile [phone]     Okay to leave a detailed message regarding call? Yes    P/w diarrhea associated with weakness since early August s/p colectomy surgery.  -CT A/P as above  Tolerating po. ADAT.   Diarrhea 9/6  GI PCR, ova and parasite, and stool culture negative  -ID Dr. Monreal following  Continue IVF